# Patient Record
Sex: MALE | Race: OTHER | Employment: FULL TIME | URBAN - METROPOLITAN AREA
[De-identification: names, ages, dates, MRNs, and addresses within clinical notes are randomized per-mention and may not be internally consistent; named-entity substitution may affect disease eponyms.]

---

## 2019-04-26 ENCOUNTER — HOSPITAL ENCOUNTER (EMERGENCY)
Age: 34
Discharge: HOME | End: 2019-04-26

## 2019-04-26 ENCOUNTER — APPOINTMENT (OUTPATIENT)
Dept: GENERAL RADIOLOGY | Age: 34
End: 2019-04-26

## 2019-04-26 VITALS
OXYGEN SATURATION: 96 % | HEART RATE: 109 BPM | WEIGHT: 208 LBS | RESPIRATION RATE: 20 BRPM | DIASTOLIC BLOOD PRESSURE: 75 MMHG | SYSTOLIC BLOOD PRESSURE: 134 MMHG | TEMPERATURE: 99.4 F

## 2019-04-26 DIAGNOSIS — J45.21 MILD INTERMITTENT ASTHMA WITH EXACERBATION: ICD-10-CM

## 2019-04-26 DIAGNOSIS — J18.9 PNEUMONIA DUE TO ORGANISM: Primary | ICD-10-CM

## 2019-04-26 LAB
ALBUMIN SERPL-MCNC: 4.1 G/DL (ref 3.5–4.6)
ALP BLD-CCNC: 76 U/L (ref 35–104)
ALT SERPL-CCNC: 52 U/L (ref 0–41)
ANION GAP SERPL CALCULATED.3IONS-SCNC: 16 MEQ/L (ref 9–15)
AST SERPL-CCNC: 34 U/L (ref 0–40)
BASOPHILS ABSOLUTE: 0 K/UL (ref 0–0.2)
BASOPHILS RELATIVE PERCENT: 0.9 %
BILIRUB SERPL-MCNC: 0.4 MG/DL (ref 0.2–0.7)
BUN BLDV-MCNC: 16 MG/DL (ref 6–20)
CALCIUM SERPL-MCNC: 8.5 MG/DL (ref 8.5–9.9)
CHLORIDE BLD-SCNC: 102 MEQ/L (ref 95–107)
CO2: 20 MEQ/L (ref 20–31)
CREAT SERPL-MCNC: 1.03 MG/DL (ref 0.7–1.2)
EOSINOPHILS ABSOLUTE: 0.1 K/UL (ref 0–0.7)
EOSINOPHILS RELATIVE PERCENT: 1.9 %
GFR AFRICAN AMERICAN: >60
GFR NON-AFRICAN AMERICAN: >60
GLOBULIN: 2.5 G/DL (ref 2.3–3.5)
GLUCOSE BLD-MCNC: 107 MG/DL (ref 70–99)
HCT VFR BLD CALC: 46.1 % (ref 42–52)
HEMOGLOBIN: 16.1 G/DL (ref 14–18)
LYMPHOCYTES ABSOLUTE: 1.2 K/UL (ref 1–4.8)
LYMPHOCYTES RELATIVE PERCENT: 22.3 %
MCH RBC QN AUTO: 32.2 PG (ref 27–31.3)
MCHC RBC AUTO-ENTMCNC: 34.8 % (ref 33–37)
MCV RBC AUTO: 92.4 FL (ref 80–100)
MONOCYTES ABSOLUTE: 1 K/UL (ref 0.2–0.8)
MONOCYTES RELATIVE PERCENT: 17.6 %
NEUTROPHILS ABSOLUTE: 3.2 K/UL (ref 1.4–6.5)
NEUTROPHILS RELATIVE PERCENT: 57.3 %
PDW BLD-RTO: 13.7 % (ref 11.5–14.5)
PLATELET # BLD: 127 K/UL (ref 130–400)
POTASSIUM SERPL-SCNC: 4.4 MEQ/L (ref 3.4–4.9)
RAPID INFLUENZA  B AGN: NEGATIVE
RAPID INFLUENZA A AGN: NEGATIVE
RBC # BLD: 4.99 M/UL (ref 4.7–6.1)
SODIUM BLD-SCNC: 138 MEQ/L (ref 135–144)
TOTAL PROTEIN: 6.6 G/DL (ref 6.3–8)
WBC # BLD: 5.5 K/UL (ref 4.8–10.8)

## 2019-04-26 PROCEDURE — 36415 COLL VENOUS BLD VENIPUNCTURE: CPT

## 2019-04-26 PROCEDURE — 80053 COMPREHEN METABOLIC PANEL: CPT

## 2019-04-26 PROCEDURE — 6360000002 HC RX W HCPCS: Performed by: PERSONAL EMERGENCY RESPONSE ATTENDANT

## 2019-04-26 PROCEDURE — 99283 EMERGENCY DEPT VISIT LOW MDM: CPT

## 2019-04-26 PROCEDURE — 71046 X-RAY EXAM CHEST 2 VIEWS: CPT

## 2019-04-26 PROCEDURE — 6370000000 HC RX 637 (ALT 250 FOR IP): Performed by: PERSONAL EMERGENCY RESPONSE ATTENDANT

## 2019-04-26 PROCEDURE — 85025 COMPLETE CBC W/AUTO DIFF WBC: CPT

## 2019-04-26 PROCEDURE — 2580000003 HC RX 258: Performed by: PERSONAL EMERGENCY RESPONSE ATTENDANT

## 2019-04-26 PROCEDURE — 96374 THER/PROPH/DIAG INJ IV PUSH: CPT

## 2019-04-26 PROCEDURE — 87804 INFLUENZA ASSAY W/OPTIC: CPT

## 2019-04-26 PROCEDURE — 96375 TX/PRO/DX INJ NEW DRUG ADDON: CPT

## 2019-04-26 PROCEDURE — 94640 AIRWAY INHALATION TREATMENT: CPT

## 2019-04-26 RX ORDER — AZITHROMYCIN 500 MG/1
500 TABLET, FILM COATED ORAL ONCE
Status: COMPLETED | OUTPATIENT
Start: 2019-04-26 | End: 2019-04-26

## 2019-04-26 RX ORDER — KETOROLAC TROMETHAMINE 30 MG/ML
30 INJECTION, SOLUTION INTRAMUSCULAR; INTRAVENOUS ONCE
Status: COMPLETED | OUTPATIENT
Start: 2019-04-26 | End: 2019-04-26

## 2019-04-26 RX ORDER — IPRATROPIUM BROMIDE AND ALBUTEROL SULFATE 2.5; .5 MG/3ML; MG/3ML
1 SOLUTION RESPIRATORY (INHALATION) CONTINUOUS PRN
Status: DISCONTINUED | OUTPATIENT
Start: 2019-04-26 | End: 2019-04-26 | Stop reason: HOSPADM

## 2019-04-26 RX ORDER — AZITHROMYCIN 250 MG/1
250 TABLET, FILM COATED ORAL DAILY
Qty: 4 TABLET | Refills: 0 | Status: SHIPPED | OUTPATIENT
Start: 2019-04-26 | End: 2019-04-30

## 2019-04-26 RX ORDER — 0.9 % SODIUM CHLORIDE 0.9 %
1000 INTRAVENOUS SOLUTION INTRAVENOUS ONCE
Status: COMPLETED | OUTPATIENT
Start: 2019-04-26 | End: 2019-04-26

## 2019-04-26 RX ORDER — PREDNISONE 10 MG/1
60 TABLET ORAL DAILY
Qty: 30 TABLET | Refills: 0 | Status: SHIPPED | OUTPATIENT
Start: 2019-04-26 | End: 2019-05-01

## 2019-04-26 RX ORDER — METHYLPREDNISOLONE SODIUM SUCCINATE 125 MG/2ML
125 INJECTION, POWDER, LYOPHILIZED, FOR SOLUTION INTRAMUSCULAR; INTRAVENOUS ONCE
Status: COMPLETED | OUTPATIENT
Start: 2019-04-26 | End: 2019-04-26

## 2019-04-26 RX ADMIN — IPRATROPIUM BROMIDE AND ALBUTEROL SULFATE 1 AMPULE: .5; 3 SOLUTION RESPIRATORY (INHALATION) at 04:35

## 2019-04-26 RX ADMIN — AZITHROMYCIN 500 MG: 500 TABLET, FILM COATED ORAL at 05:21

## 2019-04-26 RX ADMIN — SODIUM CHLORIDE 1000 ML: 9 INJECTION, SOLUTION INTRAVENOUS at 04:44

## 2019-04-26 RX ADMIN — METHYLPREDNISOLONE SODIUM SUCCINATE 125 MG: 125 INJECTION, POWDER, FOR SOLUTION INTRAMUSCULAR; INTRAVENOUS at 05:21

## 2019-04-26 RX ADMIN — KETOROLAC TROMETHAMINE 30 MG: 30 INJECTION, SOLUTION INTRAMUSCULAR at 04:44

## 2019-04-26 ASSESSMENT — ENCOUNTER SYMPTOMS
ABDOMINAL PAIN: 0
BLOOD IN STOOL: 0
COLOR CHANGE: 0
SORE THROAT: 0
VOMITING: 0
NAUSEA: 0
SHORTNESS OF BREATH: 1
COUGH: 1
RHINORRHEA: 1
WHEEZING: 1
DIARRHEA: 0

## 2019-04-26 ASSESSMENT — PAIN DESCRIPTION - DESCRIPTORS: DESCRIPTORS: ACHING

## 2019-04-26 ASSESSMENT — PULMONARY FUNCTION TESTS: PEFR_L/MIN: 300

## 2019-04-26 ASSESSMENT — PAIN SCALES - GENERAL: PAINLEVEL_OUTOF10: 7

## 2019-04-26 ASSESSMENT — PAIN DESCRIPTION - LOCATION: LOCATION: GENERALIZED;CHEST

## 2019-04-26 ASSESSMENT — PAIN DESCRIPTION - FREQUENCY: FREQUENCY: CONTINUOUS

## 2019-04-26 ASSESSMENT — PAIN DESCRIPTION - ONSET: ONSET: AWAKENED FROM SLEEP

## 2019-04-26 NOTE — ED PROVIDER NOTES
HISTORY       Social History     Socioeconomic History    Marital status:      Spouse name: None    Number of children: None    Years of education: None    Highest education level: None   Occupational History    None   Social Needs    Financial resource strain: None    Food insecurity:     Worry: None     Inability: None    Transportation needs:     Medical: None     Non-medical: None   Tobacco Use    Smoking status: Current Every Day Smoker     Types: Cigarettes    Smokeless tobacco: Never Used   Substance and Sexual Activity    Alcohol use: Yes     Comment: socially    Drug use: Never    Sexual activity: Yes     Partners: Female   Lifestyle    Physical activity:     Days per week: None     Minutes per session: None    Stress: None   Relationships    Social connections:     Talks on phone: None     Gets together: None     Attends Worship service: None     Active member of club or organization: None     Attends meetings of clubs or organizations: None     Relationship status: None    Intimate partner violence:     Fear of current or ex partner: None     Emotionally abused: None     Physically abused: None     Forced sexual activity: None   Other Topics Concern    None   Social History Narrative    None         PHYSICAL EXAM         ED Triage Vitals [04/26/19 0413]   BP Temp Temp Source Pulse Resp SpO2 Height Weight   134/75 99.4 °F (37.4 °C) Oral 109 20 94 % -- 208 lb (94.3 kg)       Physical Exam   Constitutional: He is oriented to person, place, and time. He appears well-developed and well-nourished. HENT:   Head: Normocephalic and atraumatic. Mouth/Throat: Oropharynx is clear and moist. No oropharyngeal exudate. Right ear with cerumen impaction, unable to visualize TM. Left ear appears to have pustule otitis media. No discharge, no drainage, TM rupture. Eyes: Pupils are equal, round, and reactive to light. Conjunctivae and EOM are normal.   Neck: Normal range of motion.  Neck supple. No tracheal deviation present. Cardiovascular: Normal heart sounds and intact distal pulses. Pulmonary/Chest: Effort normal. No stridor. No respiratory distress. He has wheezes. Inspiratory and expiratory wheezing throughout, no obvious labored respirations   Abdominal: Soft. Bowel sounds are normal. He exhibits no distension and no mass. There is no tenderness. There is no rebound and no guarding. Musculoskeletal: Normal range of motion. Neurological: He is alert and oriented to person, place, and time. He has normal reflexes. Skin: Skin is warm and dry. Capillary refill takes less than 2 seconds. No rash noted. Psychiatric: He has a normal mood and affect. His behavior is normal. Judgment and thought content normal.       DIAGNOSTIC RESULTS     EKG:All EKG's are interpreted by the Emergency Department Physician who either signs or Co-signs this chart in the absence of a cardiologist.        RADIOLOGY:   Non-plain film images such as CT, Ultrasound and MRI are read by theradiologist. Plain radiographic images are visualized and preliminarily interpreted by the emergency physician with the below findings:    Interpretation per theRadiologist below, if available at the time of this note:    XR CHEST STANDARD (2 VW)    (Results Pending)           LABS:  Labs Reviewed   COMPREHENSIVE METABOLIC PANEL - Abnormal; Notable for the following components:       Result Value    Anion Gap 16 (*)     Glucose 107 (*)     ALT 52 (*)     All other components within normal limits   CBC WITH AUTO DIFFERENTIAL - Abnormal; Notable for the following components:    MCH 32.2 (*)     Platelets 491 (*)     Monocytes # 1.0 (*)     All other components within normal limits   RAPID INFLUENZA A/B ANTIGENS       All other labs were within normal range or not returned as of this dictation.     EMERGENCY DEPARTMENT COURSE and DIFFERENTIAL DIAGNOSIS/MDM:   Vitals:    Vitals:    04/26/19 0413 04/26/19 0439   BP: 134/75    Pulse: but occasionally words are mis-transcribed. )    JONI Kendall (electronically signed)  Emergency Physician Assistant         S[place]ina [place], [place]  78/32/42 [id number]

## 2019-05-21 ENCOUNTER — HOSPITAL ENCOUNTER (EMERGENCY)
Age: 34
Discharge: HOME | End: 2019-05-21
Attending: EMERGENCY MEDICINE

## 2019-05-21 ENCOUNTER — APPOINTMENT (OUTPATIENT)
Dept: GENERAL RADIOLOGY | Age: 34
End: 2019-05-21

## 2019-05-21 VITALS
OXYGEN SATURATION: 100 % | HEIGHT: 69 IN | DIASTOLIC BLOOD PRESSURE: 91 MMHG | BODY MASS INDEX: 31.1 KG/M2 | RESPIRATION RATE: 18 BRPM | TEMPERATURE: 97.8 F | WEIGHT: 210 LBS | SYSTOLIC BLOOD PRESSURE: 132 MMHG | HEART RATE: 66 BPM

## 2019-05-21 DIAGNOSIS — J45.901 MODERATE ACUTE EXACERBATION OF ASTHMA: Primary | ICD-10-CM

## 2019-05-21 LAB
ANION GAP SERPL CALCULATED.3IONS-SCNC: 14 MEQ/L (ref 9–15)
BASOPHILS ABSOLUTE: 0 K/UL (ref 0–0.2)
BASOPHILS RELATIVE PERCENT: 0.7 %
BUN BLDV-MCNC: 13 MG/DL (ref 6–20)
CALCIUM SERPL-MCNC: 8.7 MG/DL (ref 8.5–9.9)
CHLORIDE BLD-SCNC: 103 MEQ/L (ref 95–107)
CO2: 22 MEQ/L (ref 20–31)
CREAT SERPL-MCNC: 0.91 MG/DL (ref 0.7–1.2)
EKG ATRIAL RATE: 57 BPM
EKG P AXIS: 24 DEGREES
EKG P-R INTERVAL: 132 MS
EKG Q-T INTERVAL: 426 MS
EKG QRS DURATION: 92 MS
EKG QTC CALCULATION (BAZETT): 414 MS
EKG R AXIS: 33 DEGREES
EKG T AXIS: 27 DEGREES
EKG VENTRICULAR RATE: 57 BPM
EOSINOPHILS ABSOLUTE: 0.3 K/UL (ref 0–0.7)
EOSINOPHILS RELATIVE PERCENT: 5.4 %
GFR AFRICAN AMERICAN: >60
GFR NON-AFRICAN AMERICAN: >60
GLUCOSE BLD-MCNC: 163 MG/DL (ref 70–99)
HCT VFR BLD CALC: 44.7 % (ref 42–52)
HEMOGLOBIN: 16 G/DL (ref 14–18)
LYMPHOCYTES ABSOLUTE: 2.1 K/UL (ref 1–4.8)
LYMPHOCYTES RELATIVE PERCENT: 36.2 %
MCH RBC QN AUTO: 33.2 PG (ref 27–31.3)
MCHC RBC AUTO-ENTMCNC: 35.7 % (ref 33–37)
MCV RBC AUTO: 93 FL (ref 80–100)
MONOCYTES ABSOLUTE: 0.6 K/UL (ref 0.2–0.8)
MONOCYTES RELATIVE PERCENT: 10.3 %
NEUTROPHILS ABSOLUTE: 2.8 K/UL (ref 1.4–6.5)
NEUTROPHILS RELATIVE PERCENT: 47.4 %
PDW BLD-RTO: 13.7 % (ref 11.5–14.5)
PLATELET # BLD: 115 K/UL (ref 130–400)
POTASSIUM SERPL-SCNC: 3.6 MEQ/L (ref 3.4–4.9)
RBC # BLD: 4.81 M/UL (ref 4.7–6.1)
SODIUM BLD-SCNC: 139 MEQ/L (ref 135–144)
TROPONIN: <0.01 NG/ML (ref 0–0.01)
WBC # BLD: 5.9 K/UL (ref 4.8–10.8)

## 2019-05-21 PROCEDURE — 85025 COMPLETE CBC W/AUTO DIFF WBC: CPT

## 2019-05-21 PROCEDURE — 99285 EMERGENCY DEPT VISIT HI MDM: CPT

## 2019-05-21 PROCEDURE — 6360000002 HC RX W HCPCS: Performed by: EMERGENCY MEDICINE

## 2019-05-21 PROCEDURE — 94640 AIRWAY INHALATION TREATMENT: CPT

## 2019-05-21 PROCEDURE — 71045 X-RAY EXAM CHEST 1 VIEW: CPT

## 2019-05-21 PROCEDURE — 80048 BASIC METABOLIC PNL TOTAL CA: CPT

## 2019-05-21 PROCEDURE — 96374 THER/PROPH/DIAG INJ IV PUSH: CPT

## 2019-05-21 PROCEDURE — 93005 ELECTROCARDIOGRAM TRACING: CPT

## 2019-05-21 PROCEDURE — 36415 COLL VENOUS BLD VENIPUNCTURE: CPT

## 2019-05-21 PROCEDURE — 84484 ASSAY OF TROPONIN QUANT: CPT

## 2019-05-21 RX ORDER — METHYLPREDNISOLONE SODIUM SUCCINATE 125 MG/2ML
125 INJECTION, POWDER, LYOPHILIZED, FOR SOLUTION INTRAMUSCULAR; INTRAVENOUS ONCE
Status: COMPLETED | OUTPATIENT
Start: 2019-05-21 | End: 2019-05-21

## 2019-05-21 RX ORDER — PREDNISONE 20 MG/1
20 TABLET ORAL 2 TIMES DAILY
Qty: 10 TABLET | Refills: 0 | Status: SHIPPED | OUTPATIENT
Start: 2019-05-21 | End: 2019-05-26

## 2019-05-21 RX ORDER — SODIUM CHLORIDE 0.9 % (FLUSH) 0.9 %
3 SYRINGE (ML) INJECTION EVERY 8 HOURS
Status: DISCONTINUED | OUTPATIENT
Start: 2019-05-21 | End: 2019-05-21 | Stop reason: HOSPADM

## 2019-05-21 RX ORDER — ALBUTEROL SULFATE 90 UG/1
AEROSOL, METERED RESPIRATORY (INHALATION)
Qty: 1 INHALER | Refills: 1 | Status: SHIPPED | OUTPATIENT
Start: 2019-05-21 | End: 2019-07-04

## 2019-05-21 RX ADMIN — IPRATROPIUM BROMIDE 0.5 MG: 0.5 SOLUTION RESPIRATORY (INHALATION) at 02:56

## 2019-05-21 RX ADMIN — METHYLPREDNISOLONE SODIUM SUCCINATE 125 MG: 125 INJECTION, POWDER, FOR SOLUTION INTRAMUSCULAR; INTRAVENOUS at 02:41

## 2019-05-21 RX ADMIN — ALBUTEROL SULFATE 5 MG: 2.5 SOLUTION RESPIRATORY (INHALATION) at 02:56

## 2019-05-21 ASSESSMENT — ENCOUNTER SYMPTOMS
BLOOD IN STOOL: 0
SORE THROAT: 0
SHORTNESS OF BREATH: 1
BACK PAIN: 0
GASTROINTESTINAL NEGATIVE: 1
CHEST TIGHTNESS: 1
DIARRHEA: 0
COUGH: 1
ABDOMINAL DISTENTION: 0
EYE PAIN: 0
EYE DISCHARGE: 0
SINUS PAIN: 0
WHEEZING: 1
NAUSEA: 0
EYES NEGATIVE: 1
VOMITING: 0
ABDOMINAL PAIN: 0

## 2019-05-21 ASSESSMENT — PAIN DESCRIPTION - DESCRIPTORS: DESCRIPTORS: STABBING

## 2019-05-21 ASSESSMENT — PAIN DESCRIPTION - PAIN TYPE: TYPE: ACUTE PAIN

## 2019-05-21 ASSESSMENT — PAIN DESCRIPTION - LOCATION: LOCATION: CHEST

## 2019-05-21 ASSESSMENT — PAIN DESCRIPTION - FREQUENCY: FREQUENCY: CONTINUOUS

## 2019-05-21 ASSESSMENT — PAIN DESCRIPTION - ORIENTATION: ORIENTATION: MID

## 2019-05-21 ASSESSMENT — PAIN SCALES - GENERAL: PAINLEVEL_OUTOF10: 8

## 2019-05-21 NOTE — ED TRIAGE NOTES
Pt states that he began having CP 2 hours ago and he has been having a cough for x3 weeks, today pt states his cough increased. Pt has recent dx pneumonia. Pt states that he also began having vomiting and lightheadedness x2 hours ago.

## 2019-05-21 NOTE — ED PROVIDER NOTES
[address] [facility] ED  eMERGENCY dEPARTMENT eNCOUnter      Pt Name: Bette Rai  MRN: [id number]  [place] 1985  Date of evaluation: 5/21/2019  Provider: Donald Penaloza, [address] [place] [address]       Chief Complaint   Patient presents with    Chest Pain    Cough         HISTORY OF PRESENT ILLNESS   (Location/Symptom, Timing/Onset,Context/Setting, Quality, Duration, Modifying Factors, Severity)  Note limiting factors. Bette Rai is a 35 y.o. male who presents to the emergency department complaining of wheezing cough productive yellow sputum. The patient had pneumonia 2 weeks ago was on antibiotics. He states he got better but then he started to get sick again. The patient states his chest hurts when he coughs when he takes a deep breath. Patient is a smoker and continues to smoke now. Patient's pain is achy tightness in nature reproducible with deep breath and cough nonradiating and only associated with above symptoms. HPI    NursingNotes were reviewed. REVIEW OF SYSTEMS    (2-9 systems for level 4, 10 or more for level 5)     Review of Systems   Constitutional: Negative. Negative for chills and fever. HENT: Negative. Negative for ear pain, sinus pain and sore throat. Eyes: Negative. Negative for pain and discharge. Respiratory: Positive for cough, chest tightness, shortness of breath and wheezing. Cardiovascular: Negative. Negative for chest pain, palpitations and leg swelling. Gastrointestinal: Negative. Negative for abdominal distention, abdominal pain, blood in stool, diarrhea, nausea and vomiting. Endocrine: Negative. Negative for polydipsia and polyuria. Genitourinary: Negative. Negative for difficulty urinating, dysuria, flank pain, frequency, hematuria and urgency. Musculoskeletal: Negative. Negative for arthralgias, back pain, myalgias and neck pain. Skin: Negative. Negative for rash and wound. Neurological: Negative.   Negative for dizziness, seizures, Other Topics Concern    None   Social History Narrative    None       SCREENINGS      @FLOW([id number])@      PHYSICAL EXAM    (up to 7 for level 4, 8 or more for level 5)     ED Triage Vitals [05/21/19 0224]   BP Temp Temp Source Pulse Resp SpO2 Height Weight   (!) 122/92 97.8 °F (36.6 °C) Oral 61 (!) 98 98 % 5' 9\" (1.753 m) 210 lb (95.3 kg)       Physical Exam   Constitutional: He is oriented to person, place, and time. He appears well-developed and well-nourished. HENT:   Head: Normocephalic and atraumatic. Right Ear: External ear normal.   Left Ear: External ear normal.   Eyes: Pupils are equal, round, and reactive to light. Conjunctivae and EOM are normal.   Neck: Normal range of motion. Neck supple. No JVD present. No tracheal deviation present. Cardiovascular: Normal rate, regular rhythm, normal heart sounds and intact distal pulses. Exam reveals no friction rub. No murmur heard. Pulmonary/Chest: Effort normal. No respiratory distress. He has wheezes. He exhibits tenderness. Abdominal: Soft. Bowel sounds are normal. He exhibits no distension. There is no tenderness. There is no guarding. Musculoskeletal: Normal range of motion. He exhibits no edema, tenderness or deformity. Lymphadenopathy:     He has no cervical adenopathy. Neurological: He is alert and oriented to person, place, and time. Skin: Skin is warm and dry. No rash noted. No erythema. No pallor. Psychiatric: He has a normal mood and affect. His behavior is normal.   Nursing note and vitals reviewed. DIAGNOSTIC RESULTS     EKG: All EKG's are interpreted by the Emergency Department Physician who either signs or Co-signsthis chart in the absence of a cardiologist.    EKG interpreted by myself shows a sinus bradycardia 57 ventricular rate. Vital 132 ms QT corrected 414 ms. There is no ST-T wave changes.     RADIOLOGY:   Non-plain filmimages such as CT, Ultrasound and MRI are read by the radiologist. Plain radiographic images are visualized and preliminarily interpreted by the emergency physician with the below findings:    Chest x-rays negative normal cardiopulmonary shadow    Interpretation per the Radiologist below, if available at the time ofthis note:    XR CHEST PORTABLE    (Results Pending)         ED BEDSIDE ULTRASOUND:   Performed by ED Physician - none    LABS:  Labs Reviewed   BASIC METABOLIC PANEL - Abnormal; Notable for the following components:       Result Value    Glucose 163 (*)     All other components within normal limits   CBC WITH AUTO DIFFERENTIAL - Abnormal; Notable for the following components:    MCH 33.2 (*)     Platelets 160 (*)     All other components within normal limits   TROPONIN       All other labs were within normal range or not returned as of this dictation. EMERGENCY DEPARTMENT COURSE and DIFFERENTIAL DIAGNOSIS/MDM:   Vitals:    Vitals:    05/21/19 0224 05/21/19 0301   BP: (!) 122/92    Pulse: 61    Resp: (!) 98 18   Temp: 97.8 °F (36.6 °C)    TempSrc: Oral    SpO2: 98% 100%   Weight: 210 lb (95.3 kg)    Height: 5' 9\" (1.753 m)        Patient's chest x-ray is negative the patient received breathing treatments and he is doing much better at this time he still has slight inspiratory wheeze but his sats are 98%. .  Patient's Coumadin be given prednisone prescription as well as albuterol inhaler and I did  him for 3 minutes on not smoking explained to him that he may not be able to withstand viral infections anymore and he is wheezing this much and hurting this much from coughing that is got chest wall pain these things are not getting better. Continues to smoke. MDM    CRITICAL CARE TIME   Total Critical Care time was 0 minutes, excluding separately reportableprocedures. There was a high probability of clinicallysignificant/life threatening deterioration in the patient's condition which required my urgent intervention.       CONSULTS:  None    PROCEDURES:  Unless otherwise noted

## 2019-05-24 PROCEDURE — 93010 ELECTROCARDIOGRAM REPORT: CPT | Performed by: INTERNAL MEDICINE

## 2019-06-29 ENCOUNTER — HOSPITAL ENCOUNTER (EMERGENCY)
Age: 34
Discharge: HOME | End: 2019-06-30

## 2019-06-29 ENCOUNTER — APPOINTMENT (OUTPATIENT)
Dept: GENERAL RADIOLOGY | Age: 34
End: 2019-06-29

## 2019-06-29 ENCOUNTER — APPOINTMENT (OUTPATIENT)
Dept: CT IMAGING | Age: 34
End: 2019-06-29

## 2019-06-29 VITALS
DIASTOLIC BLOOD PRESSURE: 80 MMHG | HEIGHT: 69 IN | HEART RATE: 90 BPM | WEIGHT: 200 LBS | RESPIRATION RATE: 18 BRPM | SYSTOLIC BLOOD PRESSURE: 120 MMHG | BODY MASS INDEX: 29.62 KG/M2 | TEMPERATURE: 98.6 F

## 2019-06-29 DIAGNOSIS — S02.2XXA CLOSED FRACTURE OF NASAL BONE, INITIAL ENCOUNTER: Primary | ICD-10-CM

## 2019-06-29 DIAGNOSIS — S16.1XXA CERVICAL STRAIN, ACUTE, INITIAL ENCOUNTER: ICD-10-CM
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
## 2019-06-29 DIAGNOSIS — S40.012A CONTUSION OF LEFT SHOULDER, INITIAL ENCOUNTER: ICD-10-CM

## 2019-06-29 PROCEDURE — 72125 CT NECK SPINE W/O DYE: CPT

## 2019-06-29 PROCEDURE — 6360000002 HC RX W HCPCS: Performed by: NURSE PRACTITIONER

## 2019-06-29 PROCEDURE — 73030 X-RAY EXAM OF SHOULDER: CPT

## 2019-06-29 PROCEDURE — 96372 THER/PROPH/DIAG INJ SC/IM: CPT

## 2019-06-29 PROCEDURE — 70450 CT HEAD/BRAIN W/O DYE: CPT

## 2019-06-29 PROCEDURE — 70486 CT MAXILLOFACIAL W/O DYE: CPT

## 2019-06-29 PROCEDURE — 99284 EMERGENCY DEPT VISIT MOD MDM: CPT

## 2019-06-29 RX ORDER — KETOROLAC TROMETHAMINE 30 MG/ML
60 INJECTION, SOLUTION INTRAMUSCULAR; INTRAVENOUS ONCE
Status: COMPLETED | OUTPATIENT
Start: 2019-06-29 | End: 2019-06-29

## 2019-06-29 RX ADMIN — KETOROLAC TROMETHAMINE 60 MG: 30 INJECTION, SOLUTION INTRAMUSCULAR; INTRAVENOUS at 22:48

## 2019-06-29 ASSESSMENT — ENCOUNTER SYMPTOMS
RHINORRHEA: 0
TROUBLE SWALLOWING: 0
BACK PAIN: 0
COLOR CHANGE: 0
COUGH: 0
CHEST TIGHTNESS: 0
VOMITING: 0
WHEEZING: 0
ABDOMINAL PAIN: 0
DIARRHEA: 0
SORE THROAT: 0
SHORTNESS OF BREATH: 0
NAUSEA: 0

## 2019-06-29 ASSESSMENT — PAIN SCALES - GENERAL
PAINLEVEL_OUTOF10: 9

## 2019-06-29 ASSESSMENT — PAIN DESCRIPTION - ORIENTATION: ORIENTATION: LEFT

## 2019-06-29 ASSESSMENT — PAIN DESCRIPTION - LOCATION: LOCATION: ARM;NOSE

## 2019-06-29 ASSESSMENT — PAIN DESCRIPTION - PAIN TYPE: TYPE: ACUTE PAIN

## 2019-06-30 RX ORDER — TRAMADOL HYDROCHLORIDE 50 MG/1
50 TABLET ORAL EVERY 6 HOURS PRN
Qty: 20 TABLET | Refills: 0 | Status: SHIPPED | OUTPATIENT
Start: 2019-06-30 | End: 2019-07-05

## 2019-06-30 RX ORDER — IBUPROFEN 600 MG/1
600 TABLET ORAL EVERY 6 HOURS PRN
Qty: 28 TABLET | Refills: 0 | Status: SHIPPED | OUTPATIENT
Start: 2019-06-30 | End: 2019-07-04 | Stop reason: ALTCHOICE

## 2019-06-30 RX ORDER — CYCLOBENZAPRINE HCL 10 MG
10 TABLET ORAL 2 TIMES DAILY PRN
Qty: 14 TABLET | Refills: 0 | Status: SHIPPED | OUTPATIENT
Start: 2019-06-30 | End: 2019-07-04 | Stop reason: ALTCHOICE
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
## 2019-07-01 ENCOUNTER — HOSPITAL ENCOUNTER (EMERGENCY)
Age: 34
Discharge: HOME | End: 2019-07-01

## 2019-07-01 VITALS
TEMPERATURE: 97.7 F | OXYGEN SATURATION: 96 % | DIASTOLIC BLOOD PRESSURE: 81 MMHG | HEIGHT: 69 IN | RESPIRATION RATE: 18 BRPM | HEART RATE: 69 BPM | SYSTOLIC BLOOD PRESSURE: 119 MMHG | WEIGHT: 200 LBS | BODY MASS INDEX: 29.62 KG/M2

## 2019-07-01 DIAGNOSIS — S02.2XXA CLOSED FRACTURE OF NASAL BONE, INITIAL ENCOUNTER: Primary | ICD-10-CM

## 2019-07-01 PROCEDURE — 99282 EMERGENCY DEPT VISIT SF MDM: CPT

## 2019-07-01 RX ORDER — NAPROXEN 500 MG/1
500 TABLET ORAL 2 TIMES DAILY
Qty: 20 TABLET | Refills: 0 | Status: SHIPPED | OUTPATIENT
Start: 2019-07-01 | End: 2019-07-04 | Stop reason: ALTCHOICE

## 2019-07-01 ASSESSMENT — ENCOUNTER SYMPTOMS
SINUS PAIN: 0
BACK PAIN: 0
SINUS PRESSURE: 0
SHORTNESS OF BREATH: 0
COUGH: 0
ABDOMINAL PAIN: 0
SORE THROAT: 0
TROUBLE SWALLOWING: 0
FACIAL SWELLING: 1
RHINORRHEA: 0

## 2019-07-01 ASSESSMENT — PAIN SCALES - GENERAL: PAINLEVEL_OUTOF10: 7

## 2019-07-01 ASSESSMENT — PAIN DESCRIPTION - LOCATION: LOCATION: FACE

## 2019-07-01 NOTE — ED PROVIDER NOTES
for Pain for up to 5 days. Intended supply: 5 days. Take lowest dose possible to manage pain       ALLERGIES     Asa [aspirin]    FAMILY HISTORY     History reviewed. No pertinent family history. SOCIAL HISTORY       Social History     Socioeconomic History    Marital status:      Spouse name: None    Number of children: None    Years of education: None    Highest education level: None   Occupational History    None   Social Needs    Financial resource strain: None    Food insecurity:     Worry: None     Inability: None    Transportation needs:     Medical: None     Non-medical: None   Tobacco Use    Smoking status: Current Every Day Smoker     Packs/day: 0.50     Types: Cigarettes    Smokeless tobacco: Never Used   Substance and Sexual Activity    Alcohol use: Yes     Comment: socially    Drug use: Never    Sexual activity: Yes     Partners: Female   Lifestyle    Physical activity:     Days per week: None     Minutes per session: None    Stress: None   Relationships    Social connections:     Talks on phone: None     Gets together: None     Attends Quaker service: None     Active member of club or organization: None     Attends meetings of clubs or organizations: None     Relationship status: None    Intimate partner violence:     Fear of current or ex partner: None     Emotionally abused: None     Physically abused: None     Forced sexual activity: None   Other Topics Concern    None   Social History Narrative    None       SCREENINGS      @FLOW([id number])@      PHYSICAL EXAM    (up to 7 for level 4, 8 or more for level 5)     ED Triage Vitals [07/01/19 1242]   BP Temp Temp Source Pulse Resp SpO2 Height Weight   119/81 97.7 °F (36.5 °C) Oral 69 18 96 % 5' 9\" (1.753 m) 200 lb (90.7 kg)       Physical Exam   Constitutional: He is oriented to person, place, and time. He appears well-developed and well-nourished. HENT:   Head: Normocephalic and atraumatic.        Right Ear: Hearing, CNP  07/01/19 1312

## 2019-07-04 ENCOUNTER — HOSPITAL ENCOUNTER (EMERGENCY)
Age: 34
Discharge: HOME | End: 2019-07-04
Attending: EMERGENCY MEDICINE

## 2019-07-04 VITALS
TEMPERATURE: 98.6 F | DIASTOLIC BLOOD PRESSURE: 75 MMHG | HEIGHT: 69 IN | OXYGEN SATURATION: 97 % | HEART RATE: 79 BPM | WEIGHT: 200 LBS | BODY MASS INDEX: 29.62 KG/M2 | RESPIRATION RATE: 18 BRPM | SYSTOLIC BLOOD PRESSURE: 116 MMHG

## 2019-07-04 DIAGNOSIS — S02.2XXD CLOSED FRACTURE OF NASAL BONE WITH ROUTINE HEALING, SUBSEQUENT ENCOUNTER: Primary | ICD-10-CM

## 2019-07-04 PROCEDURE — 99282 EMERGENCY DEPT VISIT SF MDM: CPT

## 2019-07-04 PROCEDURE — 6370000000 HC RX 637 (ALT 250 FOR IP): Performed by: EMERGENCY MEDICINE

## 2019-07-04 RX ORDER — OXYMETAZOLINE HYDROCHLORIDE 0.05 G/100ML
2 SPRAY NASAL ONCE
Status: COMPLETED | OUTPATIENT
Start: 2019-07-04 | End: 2019-07-04

## 2019-07-04 RX ORDER — OXYCODONE HYDROCHLORIDE AND ACETAMINOPHEN 5; 325 MG/1; MG/1
1 TABLET ORAL ONCE
Status: COMPLETED | OUTPATIENT
Start: 2019-07-04 | End: 2019-07-04

## 2019-07-04 RX ORDER — FLUTICASONE PROPIONATE 50 MCG
1 SPRAY, SUSPENSION (ML) NASAL DAILY
Qty: 1 BOTTLE | Refills: 0 | Status: SHIPPED | OUTPATIENT
Start: 2019-07-04 | End: 2019-08-04 | Stop reason: ALTCHOICE

## 2019-07-04 RX ORDER — OXYCODONE HYDROCHLORIDE AND ACETAMINOPHEN 5; 325 MG/1; MG/1
1 TABLET ORAL EVERY 6 HOURS PRN
Qty: 10 TABLET | Refills: 0 | Status: SHIPPED | OUTPATIENT
Start: 2019-07-04 | End: 2019-07-07

## 2019-07-04 RX ADMIN — OXYCODONE HYDROCHLORIDE AND ACETAMINOPHEN 1 TABLET: 5; 325 TABLET ORAL at 22:51

## 2019-07-04 RX ADMIN — Medication 2 SPRAY: at 22:51

## 2019-07-04 ASSESSMENT — PAIN DESCRIPTION - FREQUENCY: FREQUENCY: INTERMITTENT

## 2019-07-04 ASSESSMENT — PAIN SCALES - GENERAL: PAINLEVEL_OUTOF10: 8

## 2019-07-04 ASSESSMENT — PAIN DESCRIPTION - PROGRESSION: CLINICAL_PROGRESSION: GRADUALLY WORSENING

## 2019-07-04 ASSESSMENT — ENCOUNTER SYMPTOMS
RHINORRHEA: 0
WHEEZING: 0
PHOTOPHOBIA: 0
ABDOMINAL DISTENTION: 0
SINUS PRESSURE: 0
APNEA: 0
ABDOMINAL PAIN: 0
VOMITING: 0
CONSTIPATION: 0
NAUSEA: 0
SORE THROAT: 0
SHORTNESS OF BREATH: 0
DIARRHEA: 0
BACK PAIN: 0
COUGH: 0
COLOR CHANGE: 0
EYE PAIN: 0

## 2019-07-04 ASSESSMENT — PAIN - FUNCTIONAL ASSESSMENT: PAIN_FUNCTIONAL_ASSESSMENT: PREVENTS OR INTERFERES WITH MANY ACTIVE NOT PASSIVE ACTIVITIES

## 2019-07-04 ASSESSMENT — PAIN DESCRIPTION - ONSET: ONSET: GRADUAL

## 2019-07-04 ASSESSMENT — PAIN DESCRIPTION - PAIN TYPE: TYPE: ACUTE PAIN

## 2019-07-04 ASSESSMENT — PAIN DESCRIPTION - DESCRIPTORS: DESCRIPTORS: PRESSURE

## 2019-07-04 ASSESSMENT — PAIN DESCRIPTION - LOCATION: LOCATION: NOSE
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
## 2019-08-04 ENCOUNTER — APPOINTMENT (OUTPATIENT)
Dept: GENERAL RADIOLOGY | Age: 34
End: 2019-08-04

## 2019-08-04 ENCOUNTER — HOSPITAL ENCOUNTER (EMERGENCY)
Age: 34
Discharge: HOME | End: 2019-08-04
Attending: EMERGENCY MEDICINE
Payer: COMMERCIAL

## 2019-08-04 VITALS
OXYGEN SATURATION: 98 % | DIASTOLIC BLOOD PRESSURE: 80 MMHG | SYSTOLIC BLOOD PRESSURE: 128 MMHG | TEMPERATURE: 98.1 F | WEIGHT: 200 LBS | BODY MASS INDEX: 29.53 KG/M2 | HEART RATE: 70 BPM | RESPIRATION RATE: 18 BRPM

## 2019-08-04 DIAGNOSIS — T78.40XA ALLERGIC STATE, INITIAL ENCOUNTER: Primary | ICD-10-CM

## 2019-08-04 LAB
EKG ATRIAL RATE: 75 BPM
EKG P AXIS: 18 DEGREES
EKG P-R INTERVAL: 126 MS
EKG Q-T INTERVAL: 382 MS
EKG QRS DURATION: 88 MS
EKG QTC CALCULATION (BAZETT): 426 MS
EKG R AXIS: 26 DEGREES
EKG T AXIS: 17 DEGREES
EKG VENTRICULAR RATE: 75 BPM

## 2019-08-04 PROCEDURE — 6360000002 HC RX W HCPCS: Performed by: EMERGENCY MEDICINE

## 2019-08-04 PROCEDURE — 2580000003 HC RX 258: Performed by: EMERGENCY MEDICINE

## 2019-08-04 PROCEDURE — 71045 X-RAY EXAM CHEST 1 VIEW: CPT

## 2019-08-04 PROCEDURE — 96374 THER/PROPH/DIAG INJ IV PUSH: CPT

## 2019-08-04 PROCEDURE — 99283 EMERGENCY DEPT VISIT LOW MDM: CPT

## 2019-08-04 PROCEDURE — 96375 TX/PRO/DX INJ NEW DRUG ADDON: CPT

## 2019-08-04 PROCEDURE — 2500000003 HC RX 250 WO HCPCS: Performed by: EMERGENCY MEDICINE

## 2019-08-04 PROCEDURE — 6370000000 HC RX 637 (ALT 250 FOR IP): Performed by: EMERGENCY MEDICINE

## 2019-08-04 PROCEDURE — 93005 ELECTROCARDIOGRAM TRACING: CPT | Performed by: EMERGENCY MEDICINE

## 2019-08-04 RX ORDER — DIPHENHYDRAMINE HYDROCHLORIDE 50 MG/ML
25 INJECTION INTRAMUSCULAR; INTRAVENOUS ONCE
Status: COMPLETED | OUTPATIENT
Start: 2019-08-04 | End: 2019-08-04

## 2019-08-04 RX ORDER — FAMOTIDINE 20 MG/1
20 TABLET, FILM COATED ORAL 2 TIMES DAILY
Qty: 20 TABLET | Refills: 0 | Status: SHIPPED | OUTPATIENT
Start: 2019-08-04 | End: 2021-09-27

## 2019-08-04 RX ORDER — PREDNISONE 20 MG/1
60 TABLET ORAL ONCE
Status: COMPLETED | OUTPATIENT
Start: 2019-08-04 | End: 2019-08-04

## 2019-08-04 RX ORDER — 0.9 % SODIUM CHLORIDE 0.9 %
500 INTRAVENOUS SOLUTION INTRAVENOUS ONCE
Status: COMPLETED | OUTPATIENT
Start: 2019-08-04 | End: 2019-08-04

## 2019-08-04 RX ADMIN — DIPHENHYDRAMINE HYDROCHLORIDE 25 MG: 50 INJECTION, SOLUTION INTRAMUSCULAR; INTRAVENOUS at 02:44

## 2019-08-04 RX ADMIN — SODIUM CHLORIDE 500 ML: 9 INJECTION, SOLUTION INTRAVENOUS at 02:44

## 2019-08-04 RX ADMIN — FAMOTIDINE 20 MG: 10 INJECTION, SOLUTION INTRAVENOUS at 02:44

## 2019-08-04 RX ADMIN — PREDNISONE 60 MG: 20 TABLET ORAL at 02:44

## 2019-08-04 ASSESSMENT — ENCOUNTER SYMPTOMS
PHOTOPHOBIA: 0
EYE REDNESS: 0
VOMITING: 0
ABDOMINAL PAIN: 0
WHEEZING: 0
COUGH: 0
EYE PAIN: 0
SORE THROAT: 0
BLOOD IN STOOL: 0
NAUSEA: 0
SHORTNESS OF BREATH: 0
BACK PAIN: 0
EYE DISCHARGE: 0
DIARRHEA: 0
CONSTIPATION: 0
STRIDOR: 0

## 2019-08-04 ASSESSMENT — PAIN DESCRIPTION - PAIN TYPE: TYPE: ACUTE PAIN

## 2019-08-04 ASSESSMENT — PAIN SCALES - GENERAL: PAINLEVEL_OUTOF10: 7

## 2019-08-04 ASSESSMENT — PAIN DESCRIPTION - DESCRIPTORS: DESCRIPTORS: SHARP;STABBING

## 2019-08-04 ASSESSMENT — PAIN DESCRIPTION - LOCATION: LOCATION: CHEST;ARM

## 2019-08-04 ASSESSMENT — PAIN DESCRIPTION - ORIENTATION: ORIENTATION: LEFT

## 2019-08-04 NOTE — ED PROVIDER NOTES
Skin: Positive for rash. Allergic/Immunologic: Negative for environmental allergies. Neurological: Negative for dizziness, tremors, seizures, weakness and headaches. Hematological: Does not bruise/bleed easily. Psychiatric/Behavioral: Negative for hallucinations and suicidal ideas. The patient is not nervous/anxious. Except as noted above the remainder of the review of systems was reviewed and negative. PAST MEDICAL HISTORY     Past Medical History:   Diagnosis Date    Asthma          SURGICAL HISTORY       Past Surgical History:   Procedure Laterality Date    APPENDECTOMY           CURRENT MEDICATIONS       Previous Medications    No medications on file       ALLERGIES     Asa [aspirin]    FAMILY HISTORY     History reviewed. No pertinent family history.        SOCIAL HISTORY       Social History     Socioeconomic History    Marital status:      Spouse name: None    Number of children: None    Years of education: None    Highest education level: None   Occupational History    None   Social Needs    Financial resource strain: None    Food insecurity:     Worry: None     Inability: None    Transportation needs:     Medical: None     Non-medical: None   Tobacco Use    Smoking status: Current Every Day Smoker     Packs/day: 0.50     Types: Cigarettes    Smokeless tobacco: Never Used   Substance and Sexual Activity    Alcohol use: Yes     Comment: socially    Drug use: Never    Sexual activity: Yes     Partners: Female   Lifestyle    Physical activity:     Days per week: None     Minutes per session: None    Stress: None   Relationships    Social connections:     Talks on phone: None     Gets together: None     Attends Rastafari service: None     Active member of club or organization: None     Attends meetings of clubs or organizations: None     Relationship status: None    Intimate partner violence:     Fear of current or ex partner: None     Emotionally abused: None behavior is normal. Judgment and thought content normal.   Nursing note and vitals reviewed. DIAGNOSTIC RESULTS     EKG: All EKG's are interpreted by the Emergency Department Physician who either signs or Co-signs this chart in the absence of a cardiologist.    Of lead EKG was performed which shows normal sinus rhythm with a rate of 75 bpm.  There is no ectopy. There is no ST segment elevation or depression in any limb lead or precordial lead. Summary normal EKG. RADIOLOGY:   Non-plain film images such as CT, Ultrasound and MRI are read by the radiologist. Plain radiographic images are visualized and preliminarily interpreted by the emergency physician with the below findings:    1 view chest x-ray was performed which shows normal cardiac silhouette no infiltrates no hemothorax no pneumothorax. Summary normal 1 view chest x-ray. Interpretation per the Radiologist below, if available at the time of this note:    XR CHEST PORTABLE    (Results Pending)         ED BEDSIDE ULTRASOUND:   Performed by ED Physician - none    LABS:  Labs Reviewed - No data to display    All other labs were within normal range or not returned as of this dictation. EMERGENCY DEPARTMENT COURSE and DIFFERENTIAL DIAGNOSIS/MDM:   Vitals:    Vitals:    08/04/19 0148 08/04/19 0256 08/04/19 0308   BP: 116/75 118/74 127/82   Pulse: 89 71 72   Resp: 18 18 18   Temp: 98.1 °F (36.7 °C) 98.1 °F (36.7 °C)    TempSrc: Oral Oral    SpO2: 98% 98% 97%   Weight: 200 lb (90.7 kg)         She was treated for a medication reaction. The chest x-ray was printed and given to the patient. I informed him that his EKG was normal.  Patient states he still feels somewhat better now. Patient will be discharged home in improved condition. MDM       CRITICAL CARE TIME     CONSULTS:  None    PROCEDURES:  Unless otherwise noted below, none     Procedures    FINAL IMPRESSION      1.  Allergic state, initial encounter          DISPOSITION/PLAN

## 2019-08-04 NOTE — ED NOTES
IV d/c with cath intact. Pt brittnee well.   DC amb with Rx x1 with steady gait     Judge Ventura RN  08/04/19 7040

## 2019-08-05 PROCEDURE — 93010 ELECTROCARDIOGRAM REPORT: CPT | Performed by: INTERNAL MEDICINE

## 2019-09-12 ENCOUNTER — HOSPITAL ENCOUNTER (EMERGENCY)
Age: 34
Discharge: HOME | End: 2019-09-12
Payer: COMMERCIAL

## 2019-09-12 ENCOUNTER — APPOINTMENT (OUTPATIENT)
Dept: GENERAL RADIOLOGY | Age: 34
End: 2019-09-12
Payer: COMMERCIAL

## 2019-09-12 VITALS
HEART RATE: 80 BPM | RESPIRATION RATE: 18 BRPM | DIASTOLIC BLOOD PRESSURE: 77 MMHG | SYSTOLIC BLOOD PRESSURE: 126 MMHG | BODY MASS INDEX: 29.62 KG/M2 | WEIGHT: 200 LBS | HEIGHT: 69 IN | TEMPERATURE: 97.5 F | OXYGEN SATURATION: 100 %

## 2019-09-12 DIAGNOSIS — S50.01XA CONTUSION OF RIGHT ELBOW, INITIAL ENCOUNTER: Primary | ICD-10-CM

## 2019-09-12 PROCEDURE — 73080 X-RAY EXAM OF ELBOW: CPT

## 2019-09-12 PROCEDURE — 99283 EMERGENCY DEPT VISIT LOW MDM: CPT

## 2019-09-12 RX ORDER — NAPROXEN 500 MG/1
500 TABLET ORAL 2 TIMES DAILY
Qty: 20 TABLET | Refills: 0 | Status: SHIPPED | OUTPATIENT
Start: 2019-09-12 | End: 2021-07-30 | Stop reason: SDUPTHER

## 2019-09-12 ASSESSMENT — PAIN DESCRIPTION - DESCRIPTORS: DESCRIPTORS: ACHING

## 2019-09-12 ASSESSMENT — PAIN DESCRIPTION - LOCATION: LOCATION: ELBOW

## 2019-09-12 ASSESSMENT — PAIN DESCRIPTION - PAIN TYPE: TYPE: ACUTE PAIN

## 2019-09-12 ASSESSMENT — PAIN DESCRIPTION - ORIENTATION: ORIENTATION: RIGHT

## 2019-09-12 ASSESSMENT — PAIN DESCRIPTION - FREQUENCY: FREQUENCY: CONTINUOUS

## 2019-09-12 ASSESSMENT — ENCOUNTER SYMPTOMS
ABDOMINAL PAIN: 0
BACK PAIN: 0
SHORTNESS OF BREATH: 0
COUGH: 0

## 2019-09-12 ASSESSMENT — PAIN SCALES - GENERAL: PAINLEVEL_OUTOF10: 7
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
## 2019-09-12 NOTE — ED PROVIDER NOTES
[facility] ED  eMERGENCY dEPARTMENT eNCOUnter      Pt Name: Wendy Childers  MRN: [id number]  [place] 1985  Date of evaluation: 9/12/2019  Provider: PILAR Hogan CNP      HISTORY OF PRESENT ILLNESS    Wendy Childers is a 29 y.o. male who presents to the Emergency Department with R elbow pain. He hit his elbow in the metal side of his truck 2 weeks ago. He states he was lifting a keg at the time. He has continued to work with the pain. He does not take anything for the pain. Pain is worse with movement and lifting. Pain is moderate. REVIEW OF SYSTEMS       Review of Systems   Constitutional: Negative for fever. HENT: Negative for congestion. Respiratory: Negative for cough and shortness of breath. Cardiovascular: Negative for chest pain. Gastrointestinal: Negative for abdominal pain. Genitourinary: Negative for dysuria. Musculoskeletal: Negative for arthralgias and back pain. R elbow pain   Skin: Negative for rash. All other systems reviewed and are negative. PAST MEDICAL HISTORY     Past Medical History:   Diagnosis Date    Asthma          SURGICAL HISTORY       Past Surgical History:   Procedure Laterality Date    APPENDECTOMY           CURRENT MEDICATIONS       Previous Medications    FAMOTIDINE (PEPCID) 20 MG TABLET    Take 1 tablet by mouth 2 times daily       ALLERGIES     Asa [aspirin]    FAMILY HISTORY     History reviewed. No pertinent family history.        SOCIAL HISTORY       Social History     Socioeconomic History    Marital status:      Spouse name: None    Number of children: None    Years of education: None    Highest education level: None   Occupational History    None   Social Needs    Financial resource strain: None    Food insecurity:     Worry: None     Inability: None    Transportation needs:     Medical: None     Non-medical: None   Tobacco Use    Smoking status: Current Every Day Smoker     Packs/day: 0.50

## 2019-12-16 ENCOUNTER — HOSPITAL ENCOUNTER (EMERGENCY)
Age: 34
Discharge: HOME | End: 2019-12-16
Attending: EMERGENCY MEDICINE
Payer: COMMERCIAL

## 2019-12-16 ENCOUNTER — APPOINTMENT (OUTPATIENT)
Dept: GENERAL RADIOLOGY | Age: 34
End: 2019-12-16
Payer: COMMERCIAL

## 2019-12-16 VITALS
TEMPERATURE: 98.3 F | BODY MASS INDEX: 29.47 KG/M2 | DIASTOLIC BLOOD PRESSURE: 86 MMHG | HEART RATE: 72 BPM | RESPIRATION RATE: 18 BRPM | HEIGHT: 69 IN | OXYGEN SATURATION: 99 % | WEIGHT: 199 LBS | SYSTOLIC BLOOD PRESSURE: 128 MMHG

## 2019-12-16 DIAGNOSIS — R07.89 CHEST WALL PAIN: ICD-10-CM

## 2019-12-16 DIAGNOSIS — J06.9 ACUTE UPPER RESPIRATORY INFECTION: Primary | ICD-10-CM

## 2019-12-16 DIAGNOSIS — J98.01 BRONCHOSPASM: ICD-10-CM

## 2019-12-16 PROCEDURE — 6370000000 HC RX 637 (ALT 250 FOR IP): Performed by: EMERGENCY MEDICINE

## 2019-12-16 PROCEDURE — 71045 X-RAY EXAM CHEST 1 VIEW: CPT

## 2019-12-16 PROCEDURE — 94640 AIRWAY INHALATION TREATMENT: CPT

## 2019-12-16 PROCEDURE — 6360000002 HC RX W HCPCS: Performed by: EMERGENCY MEDICINE

## 2019-12-16 PROCEDURE — 99284 EMERGENCY DEPT VISIT MOD MDM: CPT

## 2019-12-16 RX ORDER — PREDNISONE 10 MG/1
60 TABLET ORAL DAILY
Qty: 30 TABLET | Refills: 0 | Status: SHIPPED | OUTPATIENT
Start: 2019-12-16 | End: 2019-12-21

## 2019-12-16 RX ORDER — AZITHROMYCIN 250 MG/1
TABLET, FILM COATED ORAL
Qty: 1 PACKET | Refills: 0 | Status: SHIPPED | OUTPATIENT
Start: 2019-12-16 | End: 2019-12-20

## 2019-12-16 RX ORDER — PREDNISONE 20 MG/1
60 TABLET ORAL ONCE
Status: COMPLETED | OUTPATIENT
Start: 2019-12-16 | End: 2019-12-16

## 2019-12-16 RX ORDER — ALBUTEROL SULFATE 90 UG/1
AEROSOL, METERED RESPIRATORY (INHALATION)
Qty: 1 INHALER | Refills: 1 | Status: SHIPPED | OUTPATIENT
Start: 2019-12-16 | End: 2022-05-03

## 2019-12-16 RX ADMIN — ALBUTEROL SULFATE 5 MG: 2.5 SOLUTION RESPIRATORY (INHALATION) at 21:20

## 2019-12-16 RX ADMIN — PREDNISONE 60 MG: 20 TABLET ORAL at 21:09

## 2019-12-16 RX ADMIN — IPRATROPIUM BROMIDE 0.5 MG: 0.5 SOLUTION RESPIRATORY (INHALATION) at 21:20

## 2019-12-16 ASSESSMENT — ENCOUNTER SYMPTOMS
NAUSEA: 0
WHEEZING: 1
ABDOMINAL PAIN: 0
BLOOD IN STOOL: 0
EYE PAIN: 0
GASTROINTESTINAL NEGATIVE: 1
EYE DISCHARGE: 0
CHEST TIGHTNESS: 0
VOMITING: 0
DIARRHEA: 0
EYES NEGATIVE: 1
BACK PAIN: 0
ABDOMINAL DISTENTION: 0
SHORTNESS OF BREATH: 1
SORE THROAT: 0
COUGH: 1
SINUS PAIN: 0

## 2019-12-16 ASSESSMENT — PAIN DESCRIPTION - LOCATION: LOCATION: CHEST

## 2019-12-16 ASSESSMENT — PAIN DESCRIPTION - DESCRIPTORS: DESCRIPTORS: BURNING

## 2019-12-16 ASSESSMENT — PAIN SCALES - GENERAL: PAINLEVEL_OUTOF10: 6

## 2019-12-16 ASSESSMENT — PAIN DESCRIPTION - ORIENTATION: ORIENTATION: MID

## 2020-01-01 ENCOUNTER — APPOINTMENT (OUTPATIENT)
Dept: GENERAL RADIOLOGY | Age: 35
End: 2020-01-01

## 2020-01-01 ENCOUNTER — HOSPITAL ENCOUNTER (EMERGENCY)
Age: 35
Discharge: HOME | End: 2020-01-01

## 2020-01-01 VITALS
BODY MASS INDEX: 29.62 KG/M2 | DIASTOLIC BLOOD PRESSURE: 82 MMHG | TEMPERATURE: 98.8 F | SYSTOLIC BLOOD PRESSURE: 139 MMHG | OXYGEN SATURATION: 97 % | HEIGHT: 69 IN | HEART RATE: 93 BPM | WEIGHT: 200 LBS | RESPIRATION RATE: 18 BRPM

## 2020-01-01 PROCEDURE — 73110 X-RAY EXAM OF WRIST: CPT

## 2020-01-01 PROCEDURE — 90471 IMMUNIZATION ADMIN: CPT | Performed by: PHYSICIAN ASSISTANT

## 2020-01-01 PROCEDURE — 73130 X-RAY EXAM OF HAND: CPT

## 2020-01-01 PROCEDURE — 90715 TDAP VACCINE 7 YRS/> IM: CPT | Performed by: PHYSICIAN ASSISTANT

## 2020-01-01 PROCEDURE — 99283 EMERGENCY DEPT VISIT LOW MDM: CPT

## 2020-01-01 PROCEDURE — 6370000000 HC RX 637 (ALT 250 FOR IP): Performed by: PHYSICIAN ASSISTANT

## 2020-01-01 PROCEDURE — 6360000002 HC RX W HCPCS: Performed by: PHYSICIAN ASSISTANT

## 2020-01-01 RX ORDER — HYDROCODONE BITARTRATE AND ACETAMINOPHEN 5; 325 MG/1; MG/1
1 TABLET ORAL ONCE
Status: COMPLETED | OUTPATIENT
Start: 2020-01-01 | End: 2020-01-01

## 2020-01-01 RX ORDER — BACITRACIN, NEOMYCIN, POLYMYXIN B 400; 3.5; 5 [USP'U]/G; MG/G; [USP'U]/G
OINTMENT TOPICAL ONCE
Status: COMPLETED | OUTPATIENT
Start: 2020-01-01 | End: 2020-01-01

## 2020-01-01 RX ADMIN — TETANUS TOXOID, REDUCED DIPHTHERIA TOXOID AND ACELLULAR PERTUSSIS VACCINE, ADSORBED 0.5 ML: 5; 2.5; 8; 8; 2.5 SUSPENSION INTRAMUSCULAR at 17:57

## 2020-01-01 RX ADMIN — BACITRACIN, NEOMYCIN, POLYMYXIN B 2 G: 400; 3.5; 5 OINTMENT TOPICAL at 17:56

## 2020-01-01 RX ADMIN — HYDROCODONE BITARTRATE AND ACETAMINOPHEN 1 TABLET: 5; 325 TABLET ORAL at 17:56

## 2020-01-01 ASSESSMENT — PAIN SCALES - GENERAL
PAINLEVEL_OUTOF10: 10
PAINLEVEL_OUTOF10: 10

## 2020-01-01 ASSESSMENT — PAIN DESCRIPTION - FREQUENCY: FREQUENCY: CONTINUOUS

## 2020-01-01 ASSESSMENT — ENCOUNTER SYMPTOMS
COUGH: 0
SHORTNESS OF BREATH: 0
EYE PAIN: 0
ABDOMINAL PAIN: 0
NAUSEA: 0
PHOTOPHOBIA: 0
RHINORRHEA: 0
DIARRHEA: 0
BACK PAIN: 0
SORE THROAT: 0
VOMITING: 0

## 2020-01-01 ASSESSMENT — PAIN DESCRIPTION - ONSET: ONSET: SUDDEN

## 2020-01-01 ASSESSMENT — PAIN DESCRIPTION - PAIN TYPE: TYPE: ACUTE PAIN

## 2020-01-01 ASSESSMENT — PAIN DESCRIPTION - DESCRIPTORS: DESCRIPTORS: BURNING;NUMBNESS;THROBBING

## 2020-01-01 ASSESSMENT — PAIN DESCRIPTION - LOCATION: LOCATION: HAND

## 2020-01-01 ASSESSMENT — PAIN DESCRIPTION - ORIENTATION: ORIENTATION: RIGHT;LEFT

## 2020-01-01 NOTE — ED PROVIDER NOTES
appearance. He is well-developed. He is not diaphoretic. HENT:      Head: Normocephalic and atraumatic. Eyes:      General: Lids are normal.      Conjunctiva/sclera: Conjunctivae normal.   Neck:      Musculoskeletal: Normal range of motion and neck supple. Cardiovascular:      Rate and Rhythm: Normal rate and regular rhythm. Pulses: Normal pulses. Heart sounds: Normal heart sounds. Pulmonary:      Effort: Pulmonary effort is normal.      Breath sounds: Normal breath sounds. Abdominal:      General: Bowel sounds are normal.      Palpations: Abdomen is soft. Tenderness: There is no tenderness. Musculoskeletal:      Right hand: He exhibits tenderness, bony tenderness and swelling. He exhibits normal range of motion, normal capillary refill, no deformity and no laceration. Left hand: He exhibits tenderness, bony tenderness and swelling. He exhibits normal range of motion, normal two-point discrimination, normal capillary refill and no deformity. Normal sensation noted. Normal strength noted. Hands:    Lymphadenopathy:      Cervical: No cervical adenopathy. Skin:     General: Skin is warm and dry. Capillary Refill: Capillary refill takes less than 2 seconds. Findings: No rash. Neurological:      Mental Status: He is alert and oriented to person, place, and time. Psychiatric:         Thought Content: Thought content normal.         Judgment: Judgment normal.           All other labs were within normal range or not returned as of this dictation. EMERGENCY DEPARTMENT COURSE and DIFFERENTIALDIAGNOSIS/MDM:   Vitals:    Vitals:    01/01/20 1736   BP: 139/82   Pulse: 93   Resp: 18   Temp: 98.8 °F (37.1 °C)   TempSrc: Oral   SpO2: 97%   Weight: 200 lb (90.7 kg)   Height: 5' 9\" (1.753 m)            Patient presents as described. Medicated in the ED for pain. Neurovascularly intact. He can make a fist and open his hand without some pain.   X-ray showed no acute fracture

## 2020-02-16 ASSESSMENT — PAIN DESCRIPTION - DESCRIPTORS: DESCRIPTORS: ACHING

## 2020-02-16 ASSESSMENT — PAIN SCALES - GENERAL: PAINLEVEL_OUTOF10: 7

## 2020-02-16 ASSESSMENT — PAIN DESCRIPTION - PAIN TYPE: TYPE: ACUTE PAIN

## 2020-02-16 ASSESSMENT — PAIN DESCRIPTION - ORIENTATION: ORIENTATION: LEFT

## 2020-02-16 ASSESSMENT — PAIN DESCRIPTION - LOCATION: LOCATION: EAR

## 2020-02-17 ENCOUNTER — HOSPITAL ENCOUNTER (EMERGENCY)
Age: 35
Discharge: HOME | End: 2020-02-17

## 2020-02-17 VITALS
DIASTOLIC BLOOD PRESSURE: 87 MMHG | RESPIRATION RATE: 20 BRPM | TEMPERATURE: 98.1 F | SYSTOLIC BLOOD PRESSURE: 123 MMHG | HEART RATE: 113 BPM | BODY MASS INDEX: 31.25 KG/M2 | WEIGHT: 211 LBS | HEIGHT: 69 IN | OXYGEN SATURATION: 98 %

## 2020-02-17 PROCEDURE — 6370000000 HC RX 637 (ALT 250 FOR IP): Performed by: PHYSICIAN ASSISTANT

## 2020-02-17 PROCEDURE — 99282 EMERGENCY DEPT VISIT SF MDM: CPT

## 2020-02-17 RX ORDER — TRAMADOL HYDROCHLORIDE 50 MG/1
50 TABLET ORAL ONCE
Status: COMPLETED | OUTPATIENT
Start: 2020-02-17 | End: 2020-02-17

## 2020-02-17 RX ORDER — SULFAMETHOXAZOLE AND TRIMETHOPRIM 800; 160 MG/1; MG/1
1 TABLET ORAL ONCE
Status: COMPLETED | OUTPATIENT
Start: 2020-02-17 | End: 2020-02-17

## 2020-02-17 RX ORDER — DIAPER,BRIEF,INFANT-TODD,DISP
EACH MISCELLANEOUS ONCE
Status: COMPLETED | OUTPATIENT
Start: 2020-02-17 | End: 2020-02-17

## 2020-02-17 RX ORDER — TRAMADOL HYDROCHLORIDE 50 MG/1
50 TABLET ORAL EVERY 6 HOURS PRN
Qty: 12 TABLET | Refills: 0 | Status: SHIPPED | OUTPATIENT
Start: 2020-02-17 | End: 2020-02-20

## 2020-02-17 RX ORDER — SULFAMETHOXAZOLE AND TRIMETHOPRIM 800; 160 MG/1; MG/1
1 TABLET ORAL 2 TIMES DAILY
Qty: 20 TABLET | Refills: 0 | Status: SHIPPED | OUTPATIENT
Start: 2020-02-17 | End: 2020-02-27

## 2020-02-17 RX ADMIN — TRAMADOL HYDROCHLORIDE 50 MG: 50 TABLET, FILM COATED ORAL at 00:27

## 2020-02-17 RX ADMIN — SULFAMETHOXAZOLE AND TRIMETHOPRIM 1 TABLET: 800; 160 TABLET ORAL at 00:27

## 2020-02-17 RX ADMIN — BACITRACIN ZINC 1 G: 500 OINTMENT TOPICAL at 00:27

## 2020-02-17 ASSESSMENT — ENCOUNTER SYMPTOMS
SHORTNESS OF BREATH: 0
EYE PAIN: 0
ALLERGIC/IMMUNOLOGIC NEGATIVE: 1
ABDOMINAL PAIN: 0
APNEA: 0
TROUBLE SWALLOWING: 0
COLOR CHANGE: 0

## 2020-02-17 ASSESSMENT — PAIN SCALES - GENERAL: PAINLEVEL_OUTOF10: 7

## 2020-02-17 NOTE — ED TRIAGE NOTES
Pt states that he has been having left ear lobe pain that started today.  Pt states that he has had discharge from the area

## 2020-02-17 NOTE — ED PROVIDER NOTES
[facility] ED  eMERGENCYdEPARTMENT eNCOUnter      Pt Name: Dale Apodaca  MRN: [id number]  [place] 1985  Date of evaluation: 2/16/2020  Provider:Prateek Patricia PA-C    CHIEF COMPLAINT       Chief Complaint   Patient presents with    Facial Swelling     left ear pain lobe         HISTORY OF PRESENT ILLNESS  (Location/Symptom, Timing/Onset, Context/Setting, Quality, Duration, Modifying Factors, Severity.)   Dale Apodaca is a 29 y.o. male who presents to the emergency department complaints of pain and swelling to the bilateral earlobes, left greater than right after changing the gauging of his ear piercings. Patient denies any fevers. Patient states that the pain is moderate in severity with touch and has had some bloody drainage and his earlobes are red. HPI    Nursing Notes were reviewed and I agree. REVIEW OF SYSTEMS    (2-9 systems for level 4, 10 or more for level 5)     Review of Systems   Constitutional: Negative for diaphoresis and fever. HENT: Positive for ear pain. Negative for hearing loss and trouble swallowing. Eyes: Negative for pain. Respiratory: Negative for apnea and shortness of breath. Cardiovascular: Negative for chest pain. Gastrointestinal: Negative for abdominal pain. Endocrine: Negative. Genitourinary: Negative for hematuria. Musculoskeletal: Negative for neck pain and neck stiffness. Skin: Negative for color change. Allergic/Immunologic: Negative. Neurological: Negative for dizziness and numbness. Hematological: Negative. Psychiatric/Behavioral: Negative. All other systems reviewed and are negative. Except as noted above the remainder of the review of systems was reviewed and negative.        PAST MEDICAL HISTORY     Past Medical History:   Diagnosis Date    Asthma          SURGICAL HISTORY       Past Surgical History:   Procedure Laterality Date    APPENDECTOMY           CURRENT MEDICATIONS       Previous Medications    ALBUTEROL SULFATE HFA (PROAIR HFA) 108 (90 BASE) MCG/ACT INHALER    Use every 4 hours while awake for 7-10 days then PRN wheezing  Dispense with SPACER and Instruct on use. May sub Ventolin or Proventil as needed per [employer]. FAMOTIDINE (PEPCID) 20 MG TABLET    Take 1 tablet by mouth 2 times daily    NAPROXEN (NAPROSYN) 500 MG TABLET    Take 1 tablet by mouth 2 times daily for 20 doses       ALLERGIES     Asa [aspirin]    FAMILY HISTORY     History reviewed. No pertinent family history.        SOCIAL HISTORY       Social History     Socioeconomic History    Marital status: Single     Spouse name: None    Number of children: None    Years of education: None    Highest education level: None   Occupational History    None   Social Needs    Financial resource strain: None    Food insecurity:     Worry: None     Inability: None    Transportation needs:     Medical: None     Non-medical: None   Tobacco Use    Smoking status: Current Every Day Smoker     Packs/day: 0.50     Types: Cigarettes    Smokeless tobacco: Never Used   Substance and Sexual Activity    Alcohol use: Yes     Comment: socially    Drug use: Never    Sexual activity: Yes     Partners: Female   Lifestyle    Physical activity:     Days per week: None     Minutes per session: None    Stress: None   Relationships    Social connections:     Talks on phone: None     Gets together: None     Attends Lutheran service: None     Active member of club or organization: None     Attends meetings of clubs or organizations: None     Relationship status: None    Intimate partner violence:     Fear of current or ex partner: None     Emotionally abused: None     Physically abused: None     Forced sexual activity: None   Other Topics Concern    None   Social History Narrative    None       SCREENINGS           PHYSICAL EXAM    (up to 7 forlevel 4, 8 or more for level 5)     ED Triage Vitals [02/17/20 0000]   BP Temp Temp Source Pulse Resp SpO2 Height Weight 123/87 98.1 °F (36.7 °C) Oral 113 20 98 % 5' 9\" (1.753 m) 211 lb (95.7 kg)       Physical Exam  Vitals signs and nursing note reviewed. Constitutional:       General: He is not in acute distress. Appearance: He is well-developed. He is not diaphoretic. HENT:      Head: Normocephalic and atraumatic. Right Ear: Swelling and tenderness present. Left Ear: Swelling and tenderness present. Ears:        Mouth/Throat:      Pharynx: No oropharyngeal exudate. Eyes:      General: No scleral icterus. Conjunctiva/sclera: Conjunctivae normal.      Pupils: Pupils are equal, round, and reactive to light. Neck:      Musculoskeletal: Normal range of motion and neck supple. Trachea: No tracheal deviation. Cardiovascular:      Rate and Rhythm: Normal rate. Heart sounds: Normal heart sounds. Pulmonary:      Effort: Pulmonary effort is normal. No respiratory distress. Breath sounds: Normal breath sounds. Abdominal:      General: Bowel sounds are normal. There is no distension. Palpations: Abdomen is soft. Musculoskeletal: Normal range of motion. Skin:     General: Skin is warm and dry. Findings: No erythema or rash. Neurological:      Mental Status: He is alert and oriented to person, place, and time. Cranial Nerves: No cranial nerve deficit. Motor: No abnormal muscle tone. Psychiatric:         Behavior: Behavior normal.         Thought Content:  Thought content normal.         Judgment: Judgment normal.           DIAGNOSTIC RESULTS     RADIOLOGY:   Non-plain film images such as CT, Ultrasound and MRI are read by the radiologist. Plain radiographic images are visualized and preliminarilyinterpreted by Vania Steele PA-C with the below findings:      Interpretation per the Radiologist below, if available at the time of this note:    No orders to display       LABS:  Labs Reviewed - No data to display    All other labs were within normal range or not returnedas of this dictation. EMERGENCYDEPARTMENT COURSE and DIFFERENTIAL DIAGNOSIS/MDM:   Vitals:    Vitals:    02/17/20 0000   BP: 123/87   Pulse: 113   Resp: 20   Temp: 98.1 °F (36.7 °C)   TempSrc: Oral   SpO2: 98%   Weight: 211 lb (95.7 kg)   Height: 5' 9\" (1.753 m)       REASSESSMENT      Advised patient on washing, using antibiotic ointment and oral antibiotics for infected earlobes. Do not insert any ear piercings and until lobes are fully healed    MDM    PROCEDURES:    Procedures      FINAL IMPRESSION      1. Infection of skin of both ear lobes          DISPOSITION/PLAN   DISPOSITION Decision To Discharge 02/17/2020 12:25:35 AM      PATIENT REFERRED TO:  Michael De La Vega MD  [address] [place] Alegria [address] [place] [id number]    In 3 days        DISCHARGE MEDICATIONS:  New Prescriptions    SULFAMETHOXAZOLE-TRIMETHOPRIM (BACTRIM DS) 800-160 MG PER TABLET    Take 1 tablet by mouth 2 times daily for 10 days    TRAMADOL (ULTRAM) 50 MG TABLET    Take 1 tablet by mouth every 6 hours as needed for Pain for up to 3 days. Intended supply: 3 days.  Take lowest dose possible to manage pain       (Please note that portions of this note were completed with a voice recognition program.  Efforts were made to edit the dictations but occasionally words are mis-transcribed.)    KIT Renae PA-C  02/17/20 4448

## 2020-02-17 NOTE — ED NOTES
Pt was medicated per order and bacitracin was spread onto both ears, inside and out. Pt's discharge instructions and prescriptions were reviewed. Pt had no further questions and was discharged with all of his belongings.       Jac Killian RN  02/17/20 0502

## 2020-03-20 ENCOUNTER — NURSE TRIAGE (OUTPATIENT)
Dept: OTHER | Facility: CLINIC | Age: 35
End: 2020-03-20

## 2020-03-20 ENCOUNTER — APPOINTMENT (OUTPATIENT)
Dept: GENERAL RADIOLOGY | Age: 35
End: 2020-03-20

## 2020-03-20 ENCOUNTER — HOSPITAL ENCOUNTER (EMERGENCY)
Age: 35
Discharge: HOME | End: 2020-03-20
Attending: EMERGENCY MEDICINE

## 2020-03-20 VITALS
DIASTOLIC BLOOD PRESSURE: 95 MMHG | HEART RATE: 79 BPM | BODY MASS INDEX: 31.7 KG/M2 | TEMPERATURE: 98.1 F | WEIGHT: 214 LBS | RESPIRATION RATE: 18 BRPM | SYSTOLIC BLOOD PRESSURE: 143 MMHG | HEIGHT: 69 IN | OXYGEN SATURATION: 98 %

## 2020-03-20 LAB
INFLUENZA A BY PCR: NEGATIVE
INFLUENZA B BY PCR: NEGATIVE

## 2020-03-20 PROCEDURE — 99284 EMERGENCY DEPT VISIT MOD MDM: CPT

## 2020-03-20 PROCEDURE — 71045 X-RAY EXAM CHEST 1 VIEW: CPT

## 2020-03-20 PROCEDURE — 87502 INFLUENZA DNA AMP PROBE: CPT

## 2020-03-20 PROCEDURE — 6370000000 HC RX 637 (ALT 250 FOR IP): Performed by: PERSONAL EMERGENCY RESPONSE ATTENDANT

## 2020-03-20 RX ORDER — NEBULIZER ACCESSORIES
1 KIT MISCELLANEOUS DAILY PRN
Qty: 1 KIT | Refills: 0 | Status: SHIPPED | OUTPATIENT
Start: 2020-03-20 | End: 2022-05-03

## 2020-03-20 RX ORDER — ALBUTEROL SULFATE 2.5 MG/3ML
2.5 SOLUTION RESPIRATORY (INHALATION) EVERY 6 HOURS PRN
Qty: 120 EACH | Refills: 0 | Status: SHIPPED | OUTPATIENT
Start: 2020-03-20 | End: 2022-05-03 | Stop reason: ALTCHOICE

## 2020-03-20 RX ORDER — ACETAMINOPHEN 500 MG
1000 TABLET ORAL ONCE
Status: COMPLETED | OUTPATIENT
Start: 2020-03-20 | End: 2020-03-20

## 2020-03-20 RX ORDER — AZITHROMYCIN 250 MG/1
TABLET, FILM COATED ORAL
Qty: 1 PACKET | Refills: 0 | Status: SHIPPED | OUTPATIENT
Start: 2020-03-20 | End: 2020-03-30

## 2020-03-20 RX ORDER — IPRATROPIUM BROMIDE 21 UG/1
2 SPRAY, METERED NASAL EVERY 12 HOURS
Qty: 1 BOTTLE | Refills: 0 | Status: SHIPPED | OUTPATIENT
Start: 2020-03-20 | End: 2021-09-27

## 2020-03-20 RX ORDER — IPRATROPIUM BROMIDE AND ALBUTEROL SULFATE 2.5; .5 MG/3ML; MG/3ML
1 SOLUTION RESPIRATORY (INHALATION) CONTINUOUS PRN
Status: DISCONTINUED | OUTPATIENT
Start: 2020-03-20 | End: 2020-03-20

## 2020-03-20 RX ORDER — PREDNISONE 20 MG/1
40 TABLET ORAL DAILY
Qty: 10 TABLET | Refills: 0 | Status: SHIPPED | OUTPATIENT
Start: 2020-03-20 | End: 2020-03-25

## 2020-03-20 RX ORDER — PREDNISONE 20 MG/1
40 TABLET ORAL ONCE
Status: COMPLETED | OUTPATIENT
Start: 2020-03-20 | End: 2020-03-20

## 2020-03-20 RX ADMIN — ACETAMINOPHEN 1000 MG: 500 TABLET ORAL at 04:52

## 2020-03-20 RX ADMIN — PREDNISONE 40 MG: 20 TABLET ORAL at 04:52

## 2020-03-20 ASSESSMENT — PAIN DESCRIPTION - LOCATION: LOCATION: CHEST

## 2020-03-20 ASSESSMENT — ENCOUNTER SYMPTOMS
VOMITING: 0
SHORTNESS OF BREATH: 1
BLOOD IN STOOL: 0
DIARRHEA: 0
RHINORRHEA: 0
COLOR CHANGE: 0
WHEEZING: 1
SORE THROAT: 0
ABDOMINAL PAIN: 0
NAUSEA: 0
COUGH: 1

## 2020-03-20 ASSESSMENT — PAIN DESCRIPTION - PAIN TYPE: TYPE: ACUTE PAIN

## 2020-03-20 ASSESSMENT — PAIN SCALES - GENERAL
PAINLEVEL_OUTOF10: 5
PAINLEVEL_OUTOF10: 5

## 2020-03-20 ASSESSMENT — PAIN DESCRIPTION - DESCRIPTORS: DESCRIPTORS: ACHING

## 2020-03-20 ASSESSMENT — PAIN DESCRIPTION - ORIENTATION: ORIENTATION: LEFT

## 2020-03-20 NOTE — ED TRIAGE NOTES
Patient c/o cough x 3 days, coughing up yellow sputum in mornings, states he recently was in [place], he called a corona virus hotline tonight and they advised him to come to the er,, he has left sided chest pain with cough. He last did his inhaler 3 hours ago.

## 2020-03-20 NOTE — ED PROVIDER NOTES
[address] [facility] ED  eMERGENCY dEPARTMENT eNCOUnter      Pt Name: John Veloz  MRN: [id number]  [place] 1985  Date of evaluation: 3/20/2020  Provider: JONI Sanchez      HISTORY OF PRESENT ILLNESS    John Veloz is a 29 y.o. male with PMHx of asthma presents to the emergency department with URI symptoms. Patient states for the past couple days he has had upper respiratory symptoms. He has had nasal congestion, yellow productive cough in the morning which is dry throughout the day, left anterior chest pain present with coughing, and shortness of breath with wheezing at home. Inhaler is minimally beneficial.  He spoke to [employer] [facility] who advised him to come to the ER. He did recently travel to [place]. No known ill contacts or positive contact with corona. He denies fevers. HPI    Nursing Notes were reviewed. REVIEW OF SYSTEMS       Review of Systems   Constitutional: Negative for appetite change, chills and fever. HENT: Positive for congestion. Negative for rhinorrhea and sore throat. Respiratory: Positive for cough, shortness of breath and wheezing. Cardiovascular: Positive for chest pain. Gastrointestinal: Negative for abdominal pain, blood in stool, diarrhea, nausea and vomiting. Genitourinary: Negative for difficulty urinating. Musculoskeletal: Negative for neck stiffness. Skin: Negative for color change and rash. Neurological: Negative for dizziness, syncope, weakness, light-headedness, numbness and headaches. All other systems reviewed and are negative.             PAST MEDICAL HISTORY     Past Medical History:   Diagnosis Date    Asthma          SURGICAL HISTORY       Past Surgical History:   Procedure Laterality Date    APPENDECTOMY           CURRENT MEDICATIONS       Previous Medications    ALBUTEROL SULFATE HFA (PROAIR HFA) 108 (90 BASE) MCG/ACT INHALER    Use every 4 hours while awake for 7-10 days then PRN wheezing  Dispense with SPACER and Instruct Tympanic membrane normal.      Left Ear: Tympanic membrane normal.      Mouth/Throat:      Pharynx: No oropharyngeal exudate or posterior oropharyngeal erythema. Eyes:      Conjunctiva/sclera: Conjunctivae normal.      Pupils: Pupils are equal, round, and reactive to light. Neck:      Musculoskeletal: Normal range of motion and neck supple. Trachea: No tracheal deviation. Cardiovascular:      Heart sounds: Normal heart sounds. Pulmonary:      Effort: Pulmonary effort is normal. No respiratory distress. Breath sounds: Normal breath sounds. No stridor. Abdominal:      General: Bowel sounds are normal. There is no distension. Palpations: Abdomen is soft. There is no mass. Tenderness: There is no abdominal tenderness. There is no guarding or rebound. Musculoskeletal: Normal range of motion. Skin:     General: Skin is warm and dry. Capillary Refill: Capillary refill takes less than 2 seconds. Findings: No rash. Neurological:      Mental Status: He is alert and oriented to person, place, and time. Deep Tendon Reflexes: Reflexes are normal and symmetric. Psychiatric:         Behavior: Behavior normal.         Thought Content: Thought content normal.         Judgment: Judgment normal.         DIAGNOSTIC RESULTS     EKG:All EKG's are interpreted by the Emergency Department Physician who either signs or Co-signs this chart in the absence of a cardiologist.        RADIOLOGY:   Non-plain film images such as CT, Ultrasound and MRI are read by theradiologist. Plain radiographic images are visualized and preliminarily interpreted by the emergency physician with the below findings:    Interpretation per theRadiologist below, if available at the time of this note:    XR CHEST PORTABLE    (Results Pending)           LABS:  Labs Reviewed   RAPID INFLUENZA A/B ANTIGENS       All other labs were within normal range or not returned as of this dictation.     EMERGENCY DEPARTMENT COURSE ALBUTEROL (PROVENTIL) (2.5 MG/3ML) 0.083% NEBULIZER SOLUTION    Take 3 mLs by nebulization every 6 hours as needed for Wheezing    AZITHROMYCIN (ZITHROMAX) 250 MG TABLET    Take 2 tablets (500 mg) on Day 1, followed by 1 tablet (250 mg) once daily on Days 2 through 5. IPRATROPIUM (ATROVENT) 0.03 % NASAL SPRAY    2 sprays by Each Nostril route every 12 hours    PREDNISONE (DELTASONE) 20 MG TABLET    Take 2 tablets by mouth daily for 5 doses    RESPIRATORY THERAPY SUPPLIES (NEBULIZER/TUBING/MOUTHPIECE) KIT    1 kit by Does not apply route daily as needed (sob every 4-6 hours)          (Please notethat portions of this note were completed with a voice recognition program.  Efforts were made to edit the dictations but occasionally words are mis-transcribed. )    JONI Owusu (electronically signed)  Emergency Physician Assistant         Anastasia Monma  72/24/45 [phone]

## 2020-06-10 ENCOUNTER — HOSPITAL ENCOUNTER (EMERGENCY)
Age: 35
Discharge: HOME | End: 2020-06-10
Attending: EMERGENCY MEDICINE
Payer: MEDICAID

## 2020-06-10 ENCOUNTER — APPOINTMENT (OUTPATIENT)
Dept: GENERAL RADIOLOGY | Age: 35
End: 2020-06-10
Payer: MEDICAID

## 2020-06-10 VITALS
DIASTOLIC BLOOD PRESSURE: 94 MMHG | HEART RATE: 73 BPM | BODY MASS INDEX: 32.58 KG/M2 | RESPIRATION RATE: 16 BRPM | SYSTOLIC BLOOD PRESSURE: 124 MMHG | TEMPERATURE: 98.3 F | HEIGHT: 69 IN | OXYGEN SATURATION: 97 % | WEIGHT: 220 LBS

## 2020-06-10 LAB
EKG ATRIAL RATE: 73 BPM
EKG P AXIS: 23 DEGREES
EKG P-R INTERVAL: 138 MS
EKG Q-T INTERVAL: 394 MS
EKG QRS DURATION: 90 MS
EKG QTC CALCULATION (BAZETT): 434 MS
EKG R AXIS: 33 DEGREES
EKG T AXIS: 33 DEGREES
EKG VENTRICULAR RATE: 73 BPM

## 2020-06-10 PROCEDURE — 93005 ELECTROCARDIOGRAM TRACING: CPT | Performed by: EMERGENCY MEDICINE

## 2020-06-10 PROCEDURE — 6370000000 HC RX 637 (ALT 250 FOR IP): Performed by: EMERGENCY MEDICINE

## 2020-06-10 PROCEDURE — 71046 X-RAY EXAM CHEST 2 VIEWS: CPT

## 2020-06-10 PROCEDURE — 6360000002 HC RX W HCPCS: Performed by: EMERGENCY MEDICINE

## 2020-06-10 PROCEDURE — 99283 EMERGENCY DEPT VISIT LOW MDM: CPT

## 2020-06-10 RX ORDER — IBUPROFEN 800 MG/1
800 TABLET ORAL ONCE
Status: COMPLETED | OUTPATIENT
Start: 2020-06-10 | End: 2020-06-10

## 2020-06-10 RX ORDER — DEXAMETHASONE SODIUM PHOSPHATE 4 MG/ML
10 INJECTION, SOLUTION INTRA-ARTICULAR; INTRALESIONAL; INTRAMUSCULAR; INTRAVENOUS; SOFT TISSUE ONCE
Status: COMPLETED | OUTPATIENT
Start: 2020-06-10 | End: 2020-06-10

## 2020-06-10 RX ADMIN — DEXAMETHASONE SODIUM PHOSPHATE 10 MG: 4 INJECTION, SOLUTION INTRAMUSCULAR; INTRAVENOUS at 01:13

## 2020-06-10 RX ADMIN — IBUPROFEN 800 MG: 800 TABLET, FILM COATED ORAL at 01:38

## 2020-06-10 ASSESSMENT — PAIN DESCRIPTION - PAIN TYPE
TYPE: ACUTE PAIN
TYPE: ACUTE PAIN;CHRONIC PAIN

## 2020-06-10 ASSESSMENT — PAIN DESCRIPTION - DESCRIPTORS
DESCRIPTORS: ACHING
DESCRIPTORS: ACHING

## 2020-06-10 ASSESSMENT — PAIN SCALES - GENERAL
PAINLEVEL_OUTOF10: 6
PAINLEVEL_OUTOF10: 6
PAINLEVEL_OUTOF10: 5

## 2020-06-10 ASSESSMENT — PAIN DESCRIPTION - ORIENTATION: ORIENTATION: MID

## 2020-06-10 ASSESSMENT — PAIN DESCRIPTION - FREQUENCY: FREQUENCY: CONTINUOUS

## 2020-06-10 ASSESSMENT — PAIN DESCRIPTION - LOCATION
LOCATION: CHEST
LOCATION: CHEST

## 2020-06-10 ASSESSMENT — ENCOUNTER SYMPTOMS: COUGH: 1

## 2020-06-11 ENCOUNTER — CARE COORDINATION (OUTPATIENT)
Dept: CARE COORDINATION | Age: 35
End: 2020-06-11

## 2020-06-12 PROCEDURE — 93010 ELECTROCARDIOGRAM REPORT: CPT | Performed by: INTERNAL MEDICINE

## 2020-06-16 ENCOUNTER — CARE COORDINATION (OUTPATIENT)
Dept: CARE COORDINATION | Age: 35
End: 2020-06-16

## 2020-06-23 ENCOUNTER — CARE COORDINATION (OUTPATIENT)
Dept: CARE COORDINATION | Age: 35
End: 2020-06-23

## 2020-06-23 NOTE — CARE COORDINATION
Call to [facility] for covid follow up. Noted no show for pcp appt today.  Left message to return call

## 2020-06-27 ENCOUNTER — CARE COORDINATION (OUTPATIENT)
Dept: CARE COORDINATION | Age: 35
End: 2020-06-27

## 2021-07-30 ENCOUNTER — HOSPITAL ENCOUNTER (EMERGENCY)
Age: 36
Discharge: HOME | End: 2021-07-30
Attending: EMERGENCY MEDICINE
Payer: MEDICAID

## 2021-07-30 VITALS
WEIGHT: 255 LBS | DIASTOLIC BLOOD PRESSURE: 84 MMHG | RESPIRATION RATE: 18 BRPM | HEIGHT: 69 IN | BODY MASS INDEX: 37.77 KG/M2 | SYSTOLIC BLOOD PRESSURE: 143 MMHG | TEMPERATURE: 98 F | OXYGEN SATURATION: 96 % | HEART RATE: 84 BPM

## 2021-07-30 DIAGNOSIS — H60.392 INFECTIVE OTITIS EXTERNA OF LEFT EAR: Primary | ICD-10-CM

## 2021-07-30 LAB
CHP ED QC CHECK: NORMAL
GLUCOSE BLD-MCNC: 94 MG/DL
GLUCOSE BLD-MCNC: 94 MG/DL (ref 60–115)
PERFORMED ON: NORMAL

## 2021-07-30 PROCEDURE — 99285 EMERGENCY DEPT VISIT HI MDM: CPT

## 2021-07-30 PROCEDURE — 6370000000 HC RX 637 (ALT 250 FOR IP): Performed by: EMERGENCY MEDICINE

## 2021-07-30 RX ORDER — AMOXICILLIN AND CLAVULANATE POTASSIUM 875; 125 MG/1; MG/1
1 TABLET, FILM COATED ORAL ONCE
Status: COMPLETED | OUTPATIENT
Start: 2021-07-30 | End: 2021-07-30

## 2021-07-30 RX ORDER — AMOXICILLIN AND CLAVULANATE POTASSIUM 875; 125 MG/1; MG/1
1 TABLET, FILM COATED ORAL EVERY 12 HOURS
Qty: 20 TABLET | Refills: 0 | Status: SHIPPED | OUTPATIENT
Start: 2021-07-30 | End: 2021-08-09

## 2021-07-30 RX ORDER — NAPROXEN 500 MG/1
500 TABLET ORAL 2 TIMES DAILY
Qty: 20 TABLET | Refills: 0 | Status: SHIPPED | OUTPATIENT
Start: 2021-07-30 | End: 2021-12-31 | Stop reason: SDUPTHER

## 2021-07-30 RX ORDER — NAPROXEN 250 MG/1
500 TABLET ORAL ONCE
Status: COMPLETED | OUTPATIENT
Start: 2021-07-30 | End: 2021-07-30

## 2021-07-30 RX ADMIN — AMOXICILLIN AND CLAVULANATE POTASSIUM 1 TABLET: 875; 125 TABLET, FILM COATED ORAL at 19:15

## 2021-07-30 RX ADMIN — NAPROXEN 500 MG: 250 TABLET ORAL at 19:13

## 2021-07-30 ASSESSMENT — PAIN DESCRIPTION - LOCATION: LOCATION: EAR;HEAD

## 2021-07-30 ASSESSMENT — PAIN DESCRIPTION - PAIN TYPE: TYPE: ACUTE PAIN

## 2021-07-30 ASSESSMENT — PAIN DESCRIPTION - ORIENTATION: ORIENTATION: LEFT

## 2021-07-30 ASSESSMENT — PAIN SCALES - GENERAL
PAINLEVEL_OUTOF10: 7
PAINLEVEL_OUTOF10: 6

## 2021-07-30 NOTE — ED TRIAGE NOTES
Pt to ed from home via triage with c/o left ear pain  Pt reports onset of pain about a week ago, worsening  Pt reports headache onset 3 days ago. Pt denies home analgesic use. On arrival pt skin WDI, respirations even and unlabored   Pt calm and cooperative, alert and oriented. No s/s of acute distress noted.

## 2021-07-30 NOTE — ED PROVIDER NOTES
eMERGENCY dEPARTMENT eNCOUnter      [facility]    Chief Complaint   Patient presents with    Otalgia     left ear, 1 week, with headache 3 days       HPI    Jose Griffin is a 39 y.o. male  With hx of asthma who presentsto ED from home   By private car  With complaint of left ear pain   Onset 1 week ago  Intensity of symptoms moderate   Location of symptoms left ear   Patient denies fever chills   He also noticed some discharge from the left ear      PAST MEDICAL HISTORY    Past Medical History:   Diagnosis Date    Asthma        SURGICAL HISTORY    Past Surgical History:   Procedure Laterality Date    APPENDECTOMY         CURRENT MEDICATIONS    Current Outpatient Rx   Medication Sig Dispense Refill    amoxicillin-clavulanate (AUGMENTIN) 875-125 MG per tablet Take 1 tablet by mouth every 12 hours for 10 days 20 tablet 0    naproxen (NAPROSYN) 500 MG tablet Take 1 tablet by mouth 2 times daily for 10 days 20 tablet 0    neomycin-polymyxin-hydrocortisone (CORTISPORIN) 3.5-37515-1 otic solution Place 4 drops into the left ear 3 times daily for 10 days Instill into R Ear 1 each 0    Respiratory Therapy Supplies (NEBULIZER/TUBING/MOUTHPIECE) KIT 1 kit by Does not apply route daily as needed (sob every 4-6 hours) 1 kit 0    albuterol (PROVENTIL) (2.5 MG/3ML) 0.083% nebulizer solution Take 3 mLs by nebulization every 6 hours as needed for Wheezing 120 each 0    ipratropium (ATROVENT) 0.03 % nasal spray 2 sprays by Each Nostril route every 12 hours 1 Bottle 0    albuterol sulfate HFA (PROAIR HFA) 108 (90 Base) MCG/ACT inhaler Use every 4 hours while awake for 7-10 days then PRN wheezing  Dispense with SPACER and Instruct on use. May sub Ventolin or Proventil as needed per [employer]. 1 Inhaler 1    famotidine (PEPCID) 20 MG tablet Take 1 tablet by mouth 2 times daily 20 tablet 0       ALLERGIES    Allergies   Allergen Reactions    Asa [Aspirin]        FAMILY HISTORY    History reviewed.  No pertinent family history. SOCIAL HISTORY    Social History     Socioeconomic History    Marital status: Single     Spouse name: None    Number of children: None    Years of education: None    Highest education level: None   Occupational History    None   Tobacco Use    Smoking status: Current Every Day Smoker     Packs/day: 0.00     Types: E-Cigarettes    Smokeless tobacco: Never Used   Vaping Use    Vaping Use: Every day    Substances: Nicotine    Devices: Pre-filled pod   Substance and Sexual Activity    Alcohol use: Yes     Comment: socially    Drug use: Never    Sexual activity: Yes     Partners: Female   Other Topics Concern    None   Social History Narrative    None     Social Determinants of Health     Financial Resource Strain:     Difficulty of Paying Living Expenses:    Food Insecurity:     Worried About Running Out of Food in the Last Year:     Ran Out of Food in the Last Year:    Transportation Needs:     Lack of Transportation (Medical):      Lack of Transportation (Non-Medical):    Physical Activity:     Days of Exercise per Week:     Minutes of Exercise per Session:    Stress:     Feeling of Stress :    Social Connections:     Frequency of Communication with Friends and Family:     Frequency of Social Gatherings with Friends and Family:     Attends [employer]ist Services:     Active Member of Clubs or Organizations:     Attends Club or Organization Meetings:     Marital Status:    Intimate Partner Violence:     Fear of Current or Ex-Partner:     Emotionally Abused:     Physically Abused:     Sexually Abused:        REVIEW OF SYSTEMS    Constitutional:  Denies fever, chills, weight loss or weakness   Eyes:  Denies photophobia or discharge   HENT:  Denies sore throat but c/o Left ear pain   Respiratory:  Denies cough or shortness of breath   Cardiovascular:  Denies chest pain, palpitations or swelling   GI:  Denies abdominal pain, nausea, vomiting, or diarrhea   Musculoskeletal:  Denies back pain   Skin:  Denies rash   Neurologic:  Denies headache, focal weakness or sensory changes   Endocrine:  Denies polyuria or polydypsia   Lymphatic:  Denies swollen glands   Psychiatric:  Denies depression, suicidal ideation or homicidal ideation   All systems negative except as marked. PHYSICAL EXAM    VITAL SIGNS: BP (!) 143/84   Pulse 84   Temp 98 °F (36.7 °C) (Oral)   Resp 18   Ht 5' 9\" (1.753 m)   Wt 255 lb (115.7 kg)   SpO2 96%   BMI 37.66 kg/m²    Constitutional:  Well developed, Well nourished, No acute distress, Non-toxic appearance. HENT:  Normocephalic, Atraumatic, Left external ear erythematous with slight discharge present ,No mastoid tenderness,  Oropharynx moist, No oral exudates, Nose normal. Neck- Normal range of motion, No tenderness, Supple, No stridor. Eyes:  PERRL, EOMI, Conjunctiva normal, No discharge. Respiratory:  Normal breath sounds, No respiratory distress, No wheezing, No chest tenderness. Cardiovascular:  Normal heart rate, Normal rhythm, No murmurs, No rubs, No gallops. GI:  Bowel sounds normal, Soft, No tenderness, No masses, No pulsatile masses. : No CVA tenderness. Musculoskeletal:  Intact distal pulses, No edema, No tenderness, No cyanosis, No clubbing. Good range of motion in all major joints. No tenderness to palpation or major deformities noted. Back- No tenderness. Integument:  Warm, Dry, No erythema, No rash. Lymphatic:  No lymphadenopathy noted. Neurologic:  Alert & oriented x 3, Normal motor function, Normal sensory function, No focal deficits noted.    Psychiatric:  Affect normal, Judgment normal, Mood normal.       RADIOLOGY    No orders to display       REEVALUATION   Pain control adequate     Labs  Labs Reviewed   POCT GLUCOSE             Summation      Patient Course:     ED Medications administered this visit:    Medications   naproxen (NAPROSYN) tablet 500 mg (has no administration in time range)   amoxicillin-clavulanate (AUGMENTIN) 875-125 MG per tablet 1 tablet (has no administration in time range)       New Prescriptions from this visit:    New Prescriptions    AMOXICILLIN-CLAVULANATE (AUGMENTIN) 875-125 MG PER TABLET    Take 1 tablet by mouth every 12 hours for 10 days    NAPROXEN (NAPROSYN) 500 MG TABLET    Take 1 tablet by mouth 2 times daily for 10 days    NEOMYCIN-POLYMYXIN-HYDROCORTISONE (CORTISPORIN) 3.5-41896-7 OTIC SOLUTION    Place 4 drops into the left ear 3 times daily for 10 days Instill into R Ear       Follow-up:  [address] [place] [address]. [address] [place] [address]  [phone]    Call in 1 day          Final Impression:   1.  Infective otitis externa of left ear               (Please note that portions of this note were completed with a voice recognition program.  Efforts were made to edit the dictations but occasionally words are mis-transcribed.)            Rose Del Toro MD  07/30/21 1015

## 2021-09-27 ENCOUNTER — OFFICE VISIT (OUTPATIENT)
Dept: FAMILY MEDICINE CLINIC | Age: 36
End: 2021-09-27
Payer: MEDICAID

## 2021-09-27 VITALS
DIASTOLIC BLOOD PRESSURE: 74 MMHG | HEART RATE: 85 BPM | WEIGHT: 254 LBS | HEIGHT: 69 IN | TEMPERATURE: 98.5 F | BODY MASS INDEX: 37.62 KG/M2 | SYSTOLIC BLOOD PRESSURE: 116 MMHG | OXYGEN SATURATION: 96 %

## 2021-09-27 DIAGNOSIS — Z11.4 ENCOUNTER FOR SCREENING FOR HIV: ICD-10-CM

## 2021-09-27 DIAGNOSIS — I83.813 VARICOSE VEINS OF BOTH LOWER EXTREMITIES WITH PAIN: ICD-10-CM

## 2021-09-27 DIAGNOSIS — K21.9 GASTROESOPHAGEAL REFLUX DISEASE WITHOUT ESOPHAGITIS: Primary | ICD-10-CM

## 2021-09-27 DIAGNOSIS — Z23 NEED FOR INFLUENZA VACCINATION: ICD-10-CM

## 2021-09-27 DIAGNOSIS — H61.21 IMPACTED CERUMEN OF RIGHT EAR: ICD-10-CM

## 2021-09-27 DIAGNOSIS — L30.9 DERMATITIS: ICD-10-CM

## 2021-09-27 DIAGNOSIS — Z11.59 NEED FOR HEPATITIS C SCREENING TEST: ICD-10-CM

## 2021-09-27 DIAGNOSIS — L03.90 CELLULITIS, UNSPECIFIED CELLULITIS SITE: ICD-10-CM

## 2021-09-27 DIAGNOSIS — M54.50 LUMBAR BACK PAIN: ICD-10-CM

## 2021-09-27 PROCEDURE — 4004F PT TOBACCO SCREEN RCVD TLK: CPT | Performed by: FAMILY MEDICINE

## 2021-09-27 PROCEDURE — G8427 DOCREV CUR MEDS BY ELIG CLIN: HCPCS | Performed by: FAMILY MEDICINE

## 2021-09-27 PROCEDURE — 90471 IMMUNIZATION ADMIN: CPT | Performed by: FAMILY MEDICINE

## 2021-09-27 PROCEDURE — 99204 OFFICE O/P NEW MOD 45 MIN: CPT | Performed by: FAMILY MEDICINE

## 2021-09-27 PROCEDURE — G8417 CALC BMI ABV UP PARAM F/U: HCPCS | Performed by: FAMILY MEDICINE

## 2021-09-27 PROCEDURE — 69210 REMOVE IMPACTED EAR WAX UNI: CPT | Performed by: FAMILY MEDICINE

## 2021-09-27 PROCEDURE — 90674 CCIIV4 VAC NO PRSV 0.5 ML IM: CPT | Performed by: FAMILY MEDICINE

## 2021-09-27 RX ORDER — OMEPRAZOLE 40 MG/1
40 CAPSULE, DELAYED RELEASE ORAL
Qty: 30 CAPSULE | Refills: 3 | Status: SHIPPED | OUTPATIENT
Start: 2021-09-27 | End: 2022-03-23

## 2021-09-27 RX ORDER — CEPHALEXIN 500 MG/1
500 CAPSULE ORAL 3 TIMES DAILY
Qty: 21 CAPSULE | Refills: 0 | Status: SHIPPED | OUTPATIENT
Start: 2021-09-27 | End: 2021-10-04

## 2021-09-27 SDOH — ECONOMIC STABILITY: FOOD INSECURITY: WITHIN THE PAST 12 MONTHS, YOU WORRIED THAT YOUR FOOD WOULD RUN OUT BEFORE YOU GOT MONEY TO BUY MORE.: NEVER TRUE

## 2021-09-27 SDOH — ECONOMIC STABILITY: FOOD INSECURITY: WITHIN THE PAST 12 MONTHS, THE FOOD YOU BOUGHT JUST DIDN'T LAST AND YOU DIDN'T HAVE MONEY TO GET MORE.: NEVER TRUE

## 2021-09-27 ASSESSMENT — PATIENT HEALTH QUESTIONNAIRE - PHQ9
1. LITTLE INTEREST OR PLEASURE IN DOING THINGS: 0
2. FEELING DOWN, DEPRESSED OR HOPELESS: 0
SUM OF ALL RESPONSES TO PHQ9 QUESTIONS 1 & 2: 0
SUM OF ALL RESPONSES TO PHQ QUESTIONS 1-9: 0

## 2021-09-27 ASSESSMENT — SOCIAL DETERMINANTS OF HEALTH (SDOH): HOW HARD IS IT FOR YOU TO PAY FOR THE VERY BASICS LIKE FOOD, HOUSING, MEDICAL CARE, AND HEATING?: NOT HARD AT ALL

## 2021-09-27 NOTE — PATIENT INSTRUCTIONS
Patient Education        Gastroesophageal Reflux Disease (GERD): Care Instructions  Overview     Gastroesophageal reflux disease (GERD) is the backward flow of stomach acid into the esophagus. The esophagus is the tube that leads from your throat to your stomach. A one-way valve prevents the stomach acid from backing up into this tube. But when you have GERD, this valve does not close tightly enough. This can also cause pain and swelling in your esophagus. (This is called esophagitis.)  If you have mild GERD symptoms including heartburn, you may be able to control the problem with antacids or over-the-counter medicine. You can also make lifestyle changes to help reduce your symptoms. These include changing your diet and eating habits, such as not eating late at night and losing weight. Follow-up care is a key part of your treatment and safety. Be sure to make and go to all appointments, and call your doctor if you are having problems. It's also a good idea to know your test results and keep a list of the medicines you take. How can you care for yourself at home? · Take your medicines exactly as prescribed. Call your doctor if you think you are having a problem with your medicine. · Your doctor may recommend over-the-counter medicine. For mild or occasional indigestion, antacids, such as Tums, Gaviscon, Mylanta, or Maalox, may help. Your doctor also may recommend over-the-counter acid reducers, such as famotidine (Pepcid AC), cimetidine (Tagamet HB), or omeprazole (Prilosec). Read and follow all instructions on the label. If you use these medicines often, talk with your doctor. · Change your eating habits. ? It's best to eat several small meals instead of two or three large meals. ? After you eat, wait 2 to 3 hours before you lie down. ? Chocolate, mint, and alcohol can make GERD worse. ?  Spicy foods, foods that have a lot of acid (like tomatoes and oranges), and coffee can make GERD symptoms worse in some people. If your symptoms are worse after you eat a certain food, you may want to stop eating that food to see if your symptoms get better. · Do not smoke or chew tobacco. Smoking can make GERD worse. If you need help quitting, talk to your doctor about stop-smoking programs and medicines. These can increase your chances of quitting for good. · If you have GERD symptoms at night, raise the head of your bed 6 to 8 inches by putting the frame on blocks or placing a foam wedge under the head of your mattress. (Adding extra pillows does not work.)  · Do not wear tight clothing around your middle. · Lose weight if you need to. Losing just 5 to 10 pounds can help. When should you call for help? Call your doctor now or seek immediate medical care if:    · You have new or different belly pain.     · Your stools are black and tarlike or have streaks of blood. Watch closely for changes in your health, and be sure to contact your doctor if:    · Your symptoms have not improved after 2 days.     · Food seems to catch in your throat or chest.   Where can you learn more? Go to [email or web address]. org and sign in to your [employer] account. Enter P016 in the [facility] box to learn more about \"Gastroesophageal Reflux Disease (GERD): Care Instructions. \"     If you do not have an account, please click on the \"Sign Up Now\" link. Current as of: February 10, 2021               Content Version: 13.0  © 9955-2277 [employer], [facility]. Care instructions adapted under license by [facility] ([facility]). If you have questions about a medical condition or this instruction, always ask your healthcare professional. [place] [address] any warranty or liability for your use of this information.

## 2021-09-27 NOTE — PROGRESS NOTES
Vaccine Information Sheet, \"Influenza - Inactivated\"  given to Miesha Joy, or parent/legal guardian of  Miesha Joy and verbalized understanding. Patient responses:    Have you ever had a reaction to a flu vaccine? No  Are you able to eat eggs without adverse effects? Yes  Do you have any current illness? No  Have you ever had [place] Syndrome? No    Flu vaccine given per order. Please see immunization tab.

## 2021-09-27 NOTE — PROGRESS NOTES
Chief Complaint   Patient presents with    New Patient   [facility] Maintenance     Yes to flu vaccine. HPI: Mike Latif 39 y.o. male presenting for    Patinet is here to establish ac    Erectile dysfunction   Unable to have morning erections   Or keep an erection   Denies any fever, chills, nausea, vomiting chest pain, shortness of breath, changes in urination or changes in stools. Dermatitis   Groin area   Does not go away   Has tried over the counter medication without any relief   Admits to it being itchy in nature and painful   Did have some drainage in the past.      GERD   Was on Pepcid in the past   Did not help   Pasta sauces make the symptoms worse   Fredon Tomis chicken makes it worse   Would like medication to help     Asthma   Mild   Last attack was years   Worse with changes in weather / infection     Chronic low back pain   Going on for years   Associated radiculopathy up and down the spine   Would like referral to ortho. Vaping with nicotine   Trying to quit on his own   Has decreased it   Did smoke in the past and transitioned to the vaping. Current Outpatient Medications   Medication Sig Dispense Refill    omeprazole (PRILOSEC) 40 MG delayed release capsule Take 1 capsule by mouth every morning (before breakfast) 30 capsule 3    hydrocortisone 2.5 % ointment Apply topically 2 times daily on the affected areas for 1 week. Use a small amount at a time.  20 g 1    cephALEXin (KEFLEX) 500 MG capsule Take 1 capsule by mouth 3 times daily for 7 days 21 capsule 0    Respiratory Therapy Supplies (NEBULIZER/TUBING/MOUTHPIECE) KIT 1 kit by Does not apply route daily as needed (sob every 4-6 hours) 1 kit 0    albuterol (PROVENTIL) (2.5 MG/3ML) 0.083% nebulizer solution Take 3 mLs by nebulization every 6 hours as needed for Wheezing 120 each 0    albuterol sulfate HFA (PROAIR HFA) 108 (90 Base) MCG/ACT inhaler Use every 4 hours while awake for 7-10 days then PRN wheezing Sexual Activity    Alcohol use: Yes     Comment: socially    Drug use: Never    Sexual activity: Yes     Partners: Female   Other Topics Concern    Not on file   Social History Narrative    Work at the [employer]     Has 2 children     Has 4 siblings. Not in close contact     Born in [place]. [place]oved to [place]    Hobbies: none. Social Determinants of Health     Financial Resource Strain: Low Risk     Difficulty of Paying Living Expenses: Not hard at all   Food Insecurity: No Food Insecurity    Worried About Running Out of Food in the Last Year: Never true    [place] of Food in the Last Year: Never true   Transportation Needs:     Lack of Transportation (Medical):  Lack of Transportation (Non-Medical):    Physical Activity:     Days of Exercise per Week:     Minutes of Exercise per Session:    Stress:     Feeling of Stress :    Social Connections:     Frequency of Communication with Friends and Family:     Frequency of Social Gatherings with Friends and Family:     Attends Episcopalian Services:     Active Member of Clubs or Organizations:     Attends Club or Organization Meetings:     Marital Status:    Intimate Partner Violence:     Fear of Current or Ex-Partner:     Emotionally Abused:     Physically Abused:     Sexually Abused:         /74   Pulse 85   Temp 98.5 °F (36.9 °C) (Oral)   Ht 5' 9\" (1.753 m)   Wt 254 lb (115.2 kg)   SpO2 96%   BMI 37.51 kg/m²        Physical Exam:    General appearance - alert, well appearing, and in no distress  Mental Status - alert, oriented to person, place, and time  Eyes - pupils equal and reactive, extraocular eye movements intact   Ears - bilateral TM's and external ear canals normal. Cerumen impaction in the right ear.     Nose - normal and patent, no erythema, discharge or polyps   Sinuses - Normal paranasal sinuses without tenderness   Throat - mucous membranes moist, pharynx normal without lesions   Neck - supple, no significant adenopathy   Thyroid - thyroid is normal in size without nodules or tenderness    Chest - clear to auscultation, no wheezes, rales or rhonchi, symmetric air entry   Heart - normal rate, regular rhythm, normal S1, S2, no murmurs, rubs, clicks or gallops  Abdomen - soft, nontender, nondistended, no masses or organomegaly   Back exam - full range of motion, no tenderness, palpable spasm or pain on motion   Neurological - alert, oriented, normal speech, no focal findings or movement disorder noted   Musculoskeletal - tenderness to palpation in the lumbar spine and tenderness to palpation in the paraspinal muscles. Extremities - peripheral pulses normal, no pedal edema, no clubbing or cyanosis   Skin - erythematous papular rash at the base of the scrotum and in the intergluteal folds. There is some tenderness to palpation. Palpable varicose veins in the lower extremities more in the right than the left. Labs   No results found for: TSHREFLEX  No results found for: TSH    Cerumen Removal:  The patient had cerumen removed form the right ear canal in order to visualize the  tympanic membrane. This was accomplished with a cerumen loop with no complications. The tympanic membrane was subsequently visualized. A/P: Shahnaz Boss 39 y.o. male presenting for     1. Gastroesophageal reflux disease without esophagitis  Not controlled with Pepcid. Will switch to omeprazole. Will give more information on GERD  - omeprazole (PRILOSEC) 40 MG delayed release capsule; Take 1 capsule by mouth every morning (before breakfast)  Dispense: 30 capsule; Refill: 3  - Comprehensive Metabolic Panel; Future    2. Lumbar back pain  Chronic. Would like referral   - [facility]    3. Varicose veins of both lower extremities with pain    - Jayro Thomas MD, Interventional [facility]iology[facility]    4.  Need for influenza vaccination    - INFLUENZA, MDCK QUADV, 2 YRS AND OLDER, IM, PF, PREFILL SYR OR SDV, 0.5ML (FLUCELVAX QUADV, PF)    5. Impacted cerumen of right ear  will clean in the office. 6. Encounter for screening for HIV    - HIV Screen; Future    7. Need for hepatitis C screening test    - Hepatitis C Antibody; Future    8. Dermatitis    - hydrocortisone 2.5 % ointment; Apply topically 2 times daily on the affected areas for 1 week. Use a small amount at a time. Dispense: 20 g; Refill: 1    9. Cellulitis, unspecified cellulitis site    - cephALEXin (KEFLEX) 500 MG capsule; Take 1 capsule by mouth 3 times daily for 7 days  Dispense: 21 capsule; Refill: 0          Please note, this report has been partially produced using speech recognition software  and may cause  and /or contain errors related to that system including grammar, punctuation and spelling as well as words and phrases that may seem inappropriate. If there are questions or concerns please feel free to contact me to clarify.

## 2021-10-25 ENCOUNTER — INITIAL CONSULT (OUTPATIENT)
Dept: INTERVENTIONAL RADIOLOGY/VASCULAR | Age: 36
End: 2021-10-25
Payer: MEDICAID

## 2021-10-25 VITALS
WEIGHT: 254 LBS | SYSTOLIC BLOOD PRESSURE: 116 MMHG | BODY MASS INDEX: 37.51 KG/M2 | HEART RATE: 85 BPM | DIASTOLIC BLOOD PRESSURE: 74 MMHG

## 2021-10-25 DIAGNOSIS — M79.89 PAIN AND SWELLING OF RIGHT LOWER LEG: ICD-10-CM

## 2021-10-25 DIAGNOSIS — R29.898 LEG FATIGUE: ICD-10-CM

## 2021-10-25 DIAGNOSIS — M79.661 PAIN AND SWELLING OF RIGHT LOWER LEG: ICD-10-CM

## 2021-10-25 DIAGNOSIS — I83.811 VARICOSE VEINS OF LOWER EXTREMITY WITH PAIN, RIGHT: Primary | ICD-10-CM

## 2021-10-25 PROCEDURE — 99244 OFF/OP CNSLTJ NEW/EST MOD 40: CPT | Performed by: NURSE PRACTITIONER

## 2021-10-25 PROCEDURE — G8427 DOCREV CUR MEDS BY ELIG CLIN: HCPCS | Performed by: NURSE PRACTITIONER

## 2021-10-25 PROCEDURE — G8417 CALC BMI ABV UP PARAM F/U: HCPCS | Performed by: NURSE PRACTITIONER

## 2021-10-25 PROCEDURE — G8482 FLU IMMUNIZE ORDER/ADMIN: HCPCS | Performed by: NURSE PRACTITIONER

## 2021-10-25 ASSESSMENT — ENCOUNTER SYMPTOMS
TROUBLE SWALLOWING: 0
ABDOMINAL PAIN: 0
SHORTNESS OF BREATH: 0
SORE THROAT: 0
EYES NEGATIVE: 1
COUGH: 0
RESPIRATORY NEGATIVE: 1
DIARRHEA: 0
VOMITING: 0
ABDOMINAL DISTENTION: 0
WHEEZING: 0
CONSTIPATION: 0
NAUSEA: 0

## 2021-10-25 NOTE — PROGRESS NOTES
Kim Sharpe, a male of 39 y.o. came to the office 10/25/2021. Chief Complaint   Patient presents with    Consultation     varicose veins        10/25/2021 HPI: Kim Sharpe referred by PCP for evaluation of painful varicose veins of BLE's. Patient presents with symptoms of: right leg with painful varicose veins causing swelling. Right lower leg with chronic aching, warm sensation, fatigue sensation, and edema that is daily and symptoms become worse after being up on feet for long period of time. Symptoms going on for several years with progression. LLE is asymptomatic. NSAIDS: Tolerates pain, no meds. Leg elevation: Daily with relief. Stocking use and dates: Has worn in recent past daily class two compression stockings for 6 weeks or greater without relief. They caused worsening pain. Denies claudication. Denies chest pain. Denies dyspnea. Family History   Problem Relation Age of Onset    Hypothyroidism Mother     Coronary Art Dis Paternal Grandmother        Past Surgical History:   Procedure Laterality Date    APPENDECTOMY          Past Medical History:   Diagnosis Date    Asthma     Chronic back pain        Social History     Socioeconomic History    Marital status:      Spouse name: Not on file    Number of children: Not on file    Years of education: Not on file    Highest education level: Not on file   Occupational History    Not on file   Tobacco Use    Smoking status: Current Every Day Smoker     Packs/day: 0.00     Types: E-Cigarettes    Smokeless tobacco: Never Used    Tobacco comment: Pt smokes with vapes that contains nicotine.    Vaping Use    Vaping Use: Every day    Substances: Nicotine    Devices: Pre-filled pod   Substance and Sexual Activity    Alcohol use: Yes     Comment: socially    Drug use: Never    Sexual activity: Yes     Partners: Female   Other Topics Concern    Not on file   Social History Narrative    Work at the [employer]     Has 2 edema.   Skin:     General: Skin is warm and dry. Capillary Refill: Capillary refill takes 2 to 3 seconds. Findings: No bruising, erythema or lesion. Neurological:      Mental Status: He is alert and oriented to person, place, and time. Psychiatric:         Mood and Affect: Mood normal.         Behavior: Behavior normal.                   ASSESSMENT AND PLAN:  Chart, medications, lab work reviewed. Diagnosis Orders   1. Varicose veins of lower extremity with pain, right  US NON-INV EXTREMITY VEINS BILAT COMP   2. Pain and swelling of right lower leg  US NON-INV EXTREMITY VEINS BILAT COMP    US DUP LOWER EXTREMITY RIGHT SEVEN   3. Leg fatigue  US NON-INV EXTREMITY VEINS BILAT COMP          Plan:     Orders Placed This Encounter   Procedures    US NON-INV EXTREMITY VEINS BILAT COMP     Standing Status:   Future     Standing Expiration Date:   10/25/2022    US DUP LOWER EXTREMITY RIGHT SEVEN     Standing Status:   Future     Standing Expiration Date:   12/24/2021      No orders of the defined types were placed in this encounter. --  RLE Venous US duplex and BLE venous insufficiency studies to evaluate for DVT and/or venous insufficiency with office return for results. Educated in detail disease process of venous insufficiency, purpose of ultrasound, and purpose of compression stockings. Will evaluate need for class II compression to legs once US scan and results done. --  Recommend wearing current compression socks daily during day until above results back. --  Elevate PRN LE's when sitting and/or lying for management of LE edema. --  Follow up with general practitioner for other medical concerns and management. Thank You Dr. Kathy Diego for referral of your patient to our practice for care.      Steph Card, PILAR - CNP

## 2021-10-29 ENCOUNTER — OFFICE VISIT (OUTPATIENT)
Dept: SPORTS MEDICINE | Age: 36
End: 2021-10-29
Payer: MEDICAID

## 2021-10-29 VITALS
SYSTOLIC BLOOD PRESSURE: 132 MMHG | WEIGHT: 235 LBS | HEIGHT: 69 IN | DIASTOLIC BLOOD PRESSURE: 88 MMHG | BODY MASS INDEX: 34.8 KG/M2 | TEMPERATURE: 96.5 F

## 2021-10-29 DIAGNOSIS — M46.1 SACROILIITIS (HCC): ICD-10-CM

## 2021-10-29 DIAGNOSIS — M47.26 OSTEOARTHRITIS OF SPINE WITH RADICULOPATHY, LUMBAR REGION: Primary | ICD-10-CM

## 2021-10-29 PROCEDURE — 4004F PT TOBACCO SCREEN RCVD TLK: CPT | Performed by: FAMILY MEDICINE

## 2021-10-29 PROCEDURE — G8417 CALC BMI ABV UP PARAM F/U: HCPCS | Performed by: FAMILY MEDICINE

## 2021-10-29 PROCEDURE — G8427 DOCREV CUR MEDS BY ELIG CLIN: HCPCS | Performed by: FAMILY MEDICINE

## 2021-10-29 PROCEDURE — 99204 OFFICE O/P NEW MOD 45 MIN: CPT | Performed by: FAMILY MEDICINE

## 2021-10-29 PROCEDURE — G8482 FLU IMMUNIZE ORDER/ADMIN: HCPCS | Performed by: FAMILY MEDICINE

## 2021-10-29 RX ORDER — LIDOCAINE 50 MG/G
1 PATCH TOPICAL DAILY
Qty: 30 PATCH | Refills: 0 | Status: SHIPPED | OUTPATIENT
Start: 2021-10-29 | End: 2021-11-28

## 2021-10-29 ASSESSMENT — ENCOUNTER SYMPTOMS
APNEA: 0
ABDOMINAL DISTENTION: 0
EYE DISCHARGE: 0
SINUS PAIN: 0
FACIAL SWELLING: 0
CHEST TIGHTNESS: 0

## 2021-10-29 NOTE — PROGRESS NOTES
[facility] ([facility]) Physicians  [facility]  [address], [address] [facility] , [address] [facility], [place] [address]: [phone]  F: [phone]      Amirah Troy  (1985)    10/29/2021    Subjective:     Amirah Troy is 39 y.o. male who complains today of:    Chief Complaint   Patient presents with    Lower Back Pain     Radiates Down Left leg       HPI     Patient comes in complaining of low back pain with left radiculopathy which been going on for years says most recently has gotten worse he is been using ibuprofen but it has not seemed to help him as much as it used to he is here today for further evaluation and treatment  Allergies:  Dilma Headings [aspirin]    Past Medical History:   Diagnosis Date    Asthma     Chronic back pain      Past Surgical History:   Procedure Laterality Date    APPENDECTOMY       Family History   Problem Relation Age of Onset    Hypothyroidism Mother     Coronary Art Dis Paternal Grandmother      Social History     Socioeconomic History    Marital status:      Spouse name: Not on file    Number of children: Not on file    Years of education: Not on file    Highest education level: Not on file   Occupational History    Not on file   Tobacco Use    Smoking status: Current Every Day Smoker     Packs/day: 0.00     Types: E-Cigarettes    Smokeless tobacco: Never Used    Tobacco comment: Pt smokes with vapes that contains nicotine. Vaping Use    Vaping Use: Every day    Substances: Nicotine    Devices: Pre-filled pod   Substance and Sexual Activity    Alcohol use: Yes     Comment: socially    Drug use: Never    Sexual activity: Yes     Partners: Female   Other Topics Concern    Not on file   Social History Narrative    Work at the [employer]     Has 2 children     Has 4 siblings. Not in close contact     Born in [place]. Moved to [place] 2018    Hobbies: none.        Social Determinants of Health     Financial Resource Strain: Low Risk  Difficulty of Paying Living Expenses: Not hard at all   Food Insecurity: No Food Insecurity    Worried About Running Out of Food in the Last Year: Never true    Ran Out of Food in the Last Year: Never true   Transportation Needs:     Lack of Transportation (Medical):  Lack of Transportation (Non-Medical):    Physical Activity:     Days of Exercise per Week:     Minutes of Exercise per Session:    Stress:     Feeling of Stress :    Social Connections:     Frequency of Communication with Friends and Family:     Frequency of Social Gatherings with Friends and Family:     Attends Anabaptism Services:     Active Member of Clubs or Organizations:     Attends Club or Organization Meetings:     Marital Status:    Intimate Partner Violence:     Fear of Current or Ex-Partner:     Emotionally Abused:     Physically Abused:     Sexually Abused:        Current Outpatient Medications on File Prior to Visit   Medication Sig Dispense Refill    omeprazole (PRILOSEC) 40 MG delayed release capsule Take 1 capsule by mouth every morning (before breakfast) 30 capsule 3    naproxen (NAPROSYN) 500 MG tablet Take 1 tablet by mouth 2 times daily for 10 days 20 tablet 0    Respiratory Therapy Supplies (NEBULIZER/TUBING/MOUTHPIECE) KIT 1 kit by Does not apply route daily as needed (sob every 4-6 hours) 1 kit 0    albuterol (PROVENTIL) (2.5 MG/3ML) 0.083% nebulizer solution Take 3 mLs by nebulization every 6 hours as needed for Wheezing 120 each 0    albuterol sulfate HFA (PROAIR HFA) 108 (90 Base) MCG/ACT inhaler Use every 4 hours while awake for 7-10 days then PRN wheezing  Dispense with SPACER and Instruct on use. May sub Ventolin or Proventil as needed per [employer]. 1 Inhaler 1     No current facility-administered medications on file prior to visit. Review of Systems   Constitutional: Negative for activity change and fever. HENT: Negative for congestion, facial swelling and sinus pain.     Eyes: Negative for discharge. Respiratory: Negative for apnea and chest tightness. Gastrointestinal: Negative for abdominal distention. Endocrine: Negative for cold intolerance. Genitourinary: Negative for difficulty urinating and dysuria. Allergic/Immunologic: Negative for immunocompromised state. Neurological: Negative for dizziness. Hematological: Negative for adenopathy. Psychiatric/Behavioral: Negative for agitation, behavioral problems and confusion. Objective:     Vitals:  /88 (Site: Right Upper Arm)   Temp 96.5 °F (35.8 °C) (Infrared)   Ht 5' 9\" (1.753 m)   Wt 235 lb (106.6 kg)   BMI 34.70 kg/m² Pain Score:   8      Physical Exam  Constitutional:       Appearance: Normal appearance. HENT:      Head: Normocephalic. Eyes:      Pupils: Pupils are equal, round, and reactive to light. Cardiovascular:      Rate and Rhythm: Normal rate. Pulses: Normal pulses. Pulmonary:      Breath sounds: No wheezing. Abdominal:      Palpations: Abdomen is soft. Musculoskeletal:      Cervical back: Neck supple. Skin:     General: Skin is warm and dry. Neurological:      Mental Status: He is alert and oriented to person, place, and time. Psychiatric:         Mood and Affect: Mood normal.         Behavior: Behavior normal.         Ortho Exam   Examination lumbar spine with the neurovascular muscular status to be within normal limits deep tendon reflexes were 1-4 in the lower extremity there were no tension signs the patient flexed 60 degrees extended 10 degrees without pain palpable tenderness mostly over the L5-S1 region as well as the left SI joint with positive SI signs lateralizing to the left    Assessment:      Diagnosis Orders   1. Osteoarthritis of spine with radiculopathy, lumbar region  XR LUMBAR SPINE (MIN 4 VIEWS)    lidocaine (LIDODERM) 5 %    [facility] - [place]   2.  [facility]         Plan:   Patient that is sent for evaluation treatment and send him for x-rays of lumbar spine as well as start him on physical therapy  He is a Lidoderm patch because he has hives with aspirin and some concern about using any anti-inflammatories see him back in 3 weeks consider further testing at that point if he still having issues       Orders Placed This Encounter   Medications    lidocaine (LIDODERM) 5 %     Sig: Place 1 patch onto the skin daily 12 hours on, 12 hours off. Dispense:  30 patch     Refill:  0       Orders Placed This Encounter   Procedures    XR LUMBAR SPINE (MIN 4 VIEWS)     Standing Status:   Future     Standing Expiration Date:   1/27/2022     Scheduling Instructions:      Xray Lumbar AP lateral    [facility] - [place]     Referral Priority:   Routine     Referral Type:   Eval and Treat     Referral Reason:   Specialty Services Required     Requested Specialty:   Physical Therapy     Number of Visits Requested:   1         Follow up:  Return in about 3 weeks (around 11/19/2021).     LEVI LASSITER DO

## 2021-11-15 ENCOUNTER — OFFICE VISIT (OUTPATIENT)
Dept: INTERVENTIONAL RADIOLOGY/VASCULAR | Age: 36
End: 2021-11-15
Payer: MEDICAID

## 2021-11-15 VITALS
BODY MASS INDEX: 34.7 KG/M2 | DIASTOLIC BLOOD PRESSURE: 74 MMHG | SYSTOLIC BLOOD PRESSURE: 116 MMHG | WEIGHT: 235 LBS | HEART RATE: 85 BPM

## 2021-11-15 DIAGNOSIS — M79.661 PAIN AND SWELLING OF RIGHT LOWER LEG: ICD-10-CM

## 2021-11-15 DIAGNOSIS — I87.2 EDEMA OF RIGHT LOWER LEG DUE TO PERIPHERAL VENOUS INSUFFICIENCY: Primary | ICD-10-CM

## 2021-11-15 DIAGNOSIS — G56.03 BILATERAL CARPAL TUNNEL SYNDROME: ICD-10-CM

## 2021-11-15 DIAGNOSIS — R60.0 EDEMA OF RIGHT LOWER LEG DUE TO PERIPHERAL VENOUS INSUFFICIENCY: Primary | ICD-10-CM

## 2021-11-15 DIAGNOSIS — M79.89 PAIN AND SWELLING OF RIGHT LOWER LEG: ICD-10-CM

## 2021-11-15 DIAGNOSIS — R20.0 BILATERAL HAND NUMBNESS: ICD-10-CM

## 2021-11-15 DIAGNOSIS — R29.898 LEG FATIGUE: ICD-10-CM

## 2021-11-15 DIAGNOSIS — I83.811 VARICOSE VEINS OF LOWER EXTREMITY WITH PAIN, RIGHT: ICD-10-CM

## 2021-11-15 PROCEDURE — 4004F PT TOBACCO SCREEN RCVD TLK: CPT | Performed by: NURSE PRACTITIONER

## 2021-11-15 PROCEDURE — G8482 FLU IMMUNIZE ORDER/ADMIN: HCPCS | Performed by: NURSE PRACTITIONER

## 2021-11-15 PROCEDURE — G8427 DOCREV CUR MEDS BY ELIG CLIN: HCPCS | Performed by: NURSE PRACTITIONER

## 2021-11-15 PROCEDURE — G8417 CALC BMI ABV UP PARAM F/U: HCPCS | Performed by: NURSE PRACTITIONER

## 2021-11-15 PROCEDURE — 99214 OFFICE O/P EST MOD 30 MIN: CPT | Performed by: NURSE PRACTITIONER

## 2021-11-15 RX ORDER — IBUPROFEN 800 MG/1
800 TABLET ORAL EVERY 4 HOURS PRN
COMMUNITY
End: 2021-12-31

## 2021-11-15 ASSESSMENT — ENCOUNTER SYMPTOMS
SORE THROAT: 0
EYES NEGATIVE: 1
ABDOMINAL PAIN: 0
SHORTNESS OF BREATH: 0
DIARRHEA: 0
NAUSEA: 0
CONSTIPATION: 0
ABDOMINAL DISTENTION: 0
RESPIRATORY NEGATIVE: 1
WHEEZING: 0
TROUBLE SWALLOWING: 0
COUGH: 0
VOMITING: 0

## 2021-11-15 NOTE — PROGRESS NOTES
Nba Mauricio, a male of 39 y.o. came to the office 11/15/2021. Chief Complaint   Patient presents with    Results     us results        PROGRESS NOTE:     SUBJECTIVE:     11/15/2021: Nba Mauricio returns for results of BLE venous US insufficiency studies for symptoms of RLE right leg with painful varicose veins causing swelling, Right lower leg with chronic aching, warm sensation, fatigue sensation, and edema that is daily and symptoms become worse after being up on feet for long period of time. There is CVI to right GSV from groin to foot. Wears graduated class II compression sock daily for greater than 6 weeks without relief and causing increased discomfort. LLE is asymptomatic. NSAIDS: Tolerates pain, no meds. Leg elevation: Daily with relief. Stocking use and dates: Has worn in recent past daily class two compression stockings for 6 weeks or greater without relief. They caused worsening pain. Denies claudication. Denies chest pain. Denies dyspnea. 10/25/2021 HPI: Nba Mauricio referred by PCP for evaluation of painful varicose veins of BLE's. Patient presents with symptoms of:  Symptoms going on for several years with progression. LLE is asymptomatic. NSAIDS: Tolerates pain, no meds. Leg elevation: Daily with relief. Stocking use and dates: Has worn in recent past daily class two compression stockings for 6 weeks or greater without relief. They caused worsening pain. Denies claudication. Denies chest pain. Denies dyspnea.      Family History   Problem Relation Age of Onset    Hypothyroidism Mother     Arthritis Mother     Hypertension Mother     Coronary Art Dis Paternal Grandmother     Heart Attack Paternal Grandmother     Hypertension Father        Past Surgical History:   Procedure Laterality Date    APPENDECTOMY          Past Medical History:   Diagnosis Date    Arthritis     Asthma     Chronic back pain     HA (headache)        Social History Abused: Not on file    Sexually Abused: Not on file   Housing Stability:     Unable to Pay for Housing in the Last Year: Not on file    Number of Places Lived in the Last Year: Not on file    Unstable Housing in the Last Year: Not on file       Allergies   Allergen Reactions    Asa [Aspirin]        Current Outpatient Medications on File Prior to Visit   Medication Sig Dispense Refill    ibuprofen (ADVIL;MOTRIN) 800 MG tablet Take 800 mg by mouth every 4 hours as needed      lidocaine (LIDODERM) 5 % Place 1 patch onto the skin daily 12 hours on, 12 hours off. 30 patch 0    omeprazole (PRILOSEC) 40 MG delayed release capsule Take 1 capsule by mouth every morning (before breakfast) 30 capsule 3    naproxen (NAPROSYN) 500 MG tablet Take 1 tablet by mouth 2 times daily for 10 days 20 tablet 0    Respiratory Therapy Supplies (NEBULIZER/TUBING/MOUTHPIECE) KIT 1 kit by Does not apply route daily as needed (sob every 4-6 hours) 1 kit 0    albuterol (PROVENTIL) (2.5 MG/3ML) 0.083% nebulizer solution Take 3 mLs by nebulization every 6 hours as needed for Wheezing 120 each 0    albuterol sulfate HFA (PROAIR HFA) 108 (90 Base) MCG/ACT inhaler Use every 4 hours while awake for 7-10 days then PRN wheezing  Dispense with SPACER and Instruct on use. May sub Ventolin or Proventil as needed per [employer]. 1 Inhaler 1     No current facility-administered medications on file prior to visit. Review of Systems   Constitutional: Negative for activity change, appetite change, chills and fever. HENT: Negative. Negative for congestion, sore throat and trouble swallowing. Eyes: Negative. Respiratory: Negative. Negative for cough, shortness of breath and wheezing. Cardiovascular: Positive for leg swelling (right lower leg, chronic). Negative for chest pain and palpitations. Painful varicose veins throughout right leg.     Gastrointestinal: Negative for abdominal distention, abdominal pain, constipation, diarrhea, nausea and vomiting. Endocrine: Negative. Genitourinary: Negative. Negative for decreased urine volume, difficulty urinating, enuresis and hematuria. Musculoskeletal: Negative for neck pain and neck stiffness. Chronic RLE aching and fatigue   Skin: Negative. Neurological: Negative. Negative for dizziness, light-headedness, numbness and headaches. Hematological: Negative. Psychiatric/Behavioral: Negative. OBJECTIVE:  /74   Pulse 85   Wt 235 lb (106.6 kg)   BMI 34.70 kg/m²     Physical Exam  Constitutional:       General: He is not in acute distress. Appearance: Normal appearance. He is not ill-appearing. HENT:      Head: Normocephalic and atraumatic. Nose: No congestion. Cardiovascular:      Rate and Rhythm: Normal rate. Heart sounds: Normal heart sounds. Comments: Multiple large and small varicose veins scattered throughout right leg with discomfort with palpation. Pulmonary:      Effort: Pulmonary effort is normal.   Abdominal:      Palpations: Abdomen is soft. Musculoskeletal:         General: Tenderness (with palpation to RLE varicose veins) present. No swelling, deformity or signs of injury. Cervical back: Normal range of motion and neck supple. Right lower leg: No edema. Left lower leg: No edema. Skin:     General: Skin is warm and dry. Capillary Refill: Capillary refill takes 2 to 3 seconds. Findings: No bruising, erythema or lesion. Neurological:      Mental Status: He is alert and oriented to person, place, and time. Psychiatric:         Mood and Affect: Mood normal.         Behavior: Behavior normal.                 11/3/2021:  Impression   1. No signs of deep venous thrombosis in the bilateral lower extremities.    2.There is venous insufficiency of the right great saphenous vein starting at the saphenofemoral junction and extending continuously throughout the entire length of the vein to the distal calf. Duration is 6.9 seconds, diameter is 11 mm. 3.Numerous varicose veins are noted in the bilateral calves. ASSESSMENT AND PLAN:  Chart, medications, lab work reviewed. BLE venous US insufficiency scans report reviewed personally by myself and discussed with patient. Diagnosis Orders   1. Edema of right lower leg due to peripheral venous insufficiency  Elastic Bandages & Supports (MEDICAL COMPRESSION STOCKINGS) MISC   2. Pain and swelling of right lower leg  Elastic Bandages & Supports (MEDICAL COMPRESSION STOCKINGS) MISC   3. Leg fatigue  Elastic Bandages & Supports (MEDICAL COMPRESSION STOCKINGS) MISC   4. Varicose veins of lower extremity with pain, right  Elastic Bandages & Supports (MEDICAL COMPRESSION STOCKINGS) MISC   5. Bilateral hand numbness  Vaishali Mcclellan, Neurology, [place]   6. Bilateral carpal tunnel syndrome  Vaishali Mcclellan, NeurologyL[facility]el          Plan:     Orders Placed This Encounter   Procedures   Vaishali Mcclellan Neurology, [place]     Referral Priority:   Routine     Referral Type:   Eval and Treat     Referral Reason:   Specialty Services Required     Referred to Provider:   Romina Estrella PA-C     Number of Visits Requested:   1      Orders Placed This Encounter   Medications    Elastic Bandages & Supports ([address]) MISC     Si each by Does not apply route daily Thigh high 20-30 mmhg compression stockings both legs wear daily during day and off Qhs. Wear as tolerated. Do not wear if they cause increased pain. Dispense:  1 each     Refill:  5     -- Plan:   1. Submit to insurance for Right GSV Ablation, distal sclerotherapy, Right VV phlebectomy and Right VV sclerotherapy. Procedures with risks including infection, bleeding, pain at site, DVT, vessel damage, thrombophlebitis, allergic reaction with any of these causing injury and/or possible unforseen death discussed with patient.  Patient wishes to proceed if insurance approved/covered. Discussed activity restrictions two weeks after each venous procedure. Bring compression stocking for each procedure or will not be able to perform. Patient verbalized understanding. 2.  Elevate LE's when sitting and/or lying for management of LE edema. 3.  Suggest continue to wear thigh high compression stockings daily during day for management of RLE CVI and painful varicose veins. 4.  Rx thigh high 20-30 mmhg compression stockings to wear daily during day and off nightly. Bring to each venous procedure. Patient is responsible for cost of compression socks if not covered by insurance.          Karmen Andres, APRN - CNP

## 2021-11-16 PROBLEM — S46.912A SHOULDER STRAIN, LEFT, INITIAL ENCOUNTER: Status: ACTIVE | Noted: 2020-09-28

## 2021-11-16 PROBLEM — M79.89 PAIN AND SWELLING OF LOWER LEG: Status: ACTIVE | Noted: 2021-11-16

## 2021-11-16 PROBLEM — I83.811 VARICOSE VEINS OF RIGHT LOWER EXTREMITY WITH PAIN: Status: ACTIVE | Noted: 2021-11-16

## 2021-11-16 PROBLEM — M79.669 PAIN AND SWELLING OF LOWER LEG: Status: ACTIVE | Noted: 2021-11-16

## 2021-11-19 ENCOUNTER — OFFICE VISIT (OUTPATIENT)
Dept: SPORTS MEDICINE | Age: 36
End: 2021-11-19
Payer: MEDICAID

## 2021-11-19 ENCOUNTER — TELEPHONE (OUTPATIENT)
Dept: INTERVENTIONAL RADIOLOGY/VASCULAR | Age: 36
End: 2021-11-19

## 2021-11-19 VITALS — TEMPERATURE: 96.5 F | BODY MASS INDEX: 36.29 KG/M2 | WEIGHT: 245 LBS | HEIGHT: 69 IN

## 2021-11-19 DIAGNOSIS — M47.26 OSTEOARTHRITIS OF SPINE WITH RADICULOPATHY, LUMBAR REGION: ICD-10-CM

## 2021-11-19 DIAGNOSIS — M46.1 SACROILIITIS (HCC): Primary | ICD-10-CM

## 2021-11-19 PROCEDURE — 99213 OFFICE O/P EST LOW 20 MIN: CPT | Performed by: FAMILY MEDICINE

## 2021-11-19 PROCEDURE — G8427 DOCREV CUR MEDS BY ELIG CLIN: HCPCS | Performed by: FAMILY MEDICINE

## 2021-11-19 PROCEDURE — 4004F PT TOBACCO SCREEN RCVD TLK: CPT | Performed by: FAMILY MEDICINE

## 2021-11-19 PROCEDURE — G8482 FLU IMMUNIZE ORDER/ADMIN: HCPCS | Performed by: FAMILY MEDICINE

## 2021-11-19 PROCEDURE — G8417 CALC BMI ABV UP PARAM F/U: HCPCS | Performed by: FAMILY MEDICINE

## 2021-11-19 PROCEDURE — 20552 NJX 1/MLT TRIGGER POINT 1/2: CPT | Performed by: FAMILY MEDICINE

## 2021-11-19 RX ORDER — LIDOCAINE HYDROCHLORIDE 10 MG/ML
5 INJECTION, SOLUTION INFILTRATION; PERINEURAL ONCE
Status: COMPLETED | OUTPATIENT
Start: 2021-11-19 | End: 2021-11-19

## 2021-11-19 RX ORDER — DEXAMETHASONE SODIUM PHOSPHATE 10 MG/ML
10 INJECTION, SOLUTION INTRAMUSCULAR; INTRAVENOUS ONCE
Status: SHIPPED | OUTPATIENT
Start: 2021-11-19

## 2021-11-19 RX ORDER — DEXAMETHASONE SODIUM PHOSPHATE 10 MG/ML
5 INJECTION, EMULSION INTRAMUSCULAR; INTRAVENOUS ONCE
Status: COMPLETED | OUTPATIENT
Start: 2021-11-19 | End: 2021-11-19

## 2021-11-19 RX ORDER — DEXAMETHASONE SODIUM PHOSPHATE 10 MG/ML
5 INJECTION, EMULSION INTRAMUSCULAR; INTRAVENOUS ONCE
Status: CANCELLED | OUTPATIENT
Start: 2021-11-19 | End: 2021-11-19

## 2021-11-19 RX ADMIN — LIDOCAINE HYDROCHLORIDE 5 ML: 10 INJECTION, SOLUTION INFILTRATION; PERINEURAL at 13:54

## 2021-11-19 RX ADMIN — DEXAMETHASONE SODIUM PHOSPHATE 5 MG: 10 INJECTION, EMULSION INTRAMUSCULAR; INTRAVENOUS at 13:53

## 2021-11-19 ASSESSMENT — ENCOUNTER SYMPTOMS
ABDOMINAL DISTENTION: 0
APNEA: 0
SINUS PAIN: 0
CHEST TIGHTNESS: 0
EYE DISCHARGE: 0
FACIAL SWELLING: 0

## 2021-11-19 NOTE — TELEPHONE ENCOUNTER
RECEIVED FAX FROM [facility] REGARDING VEIN PROCEDURES - DENIED - PATIENT HAS TO HAVE 1 OF THE FOLLOWING: SKIN ULCERS, BLEEDING FROM A VEIN, INFLAMMATION FOR A SWOLLEN VEIN OR INABILITY TO PERFORM BASIC LIFE FUNCTIONS. THIS INFORMATION WAS GIVEN TO KIERA FOR REVIEW.

## 2021-11-19 NOTE — PROGRESS NOTES
siblings. Not in close contact     Born in [place]. Moved to [place] 2018    Hobbies: none. Social Determinants of Health     Financial Resource Strain: Low Risk     Difficulty of Paying Living Expenses: Not hard at all   Food Insecurity: No Food Insecurity    Worried About Running Out of Food in the Last Year: Never true    [place] of Food in the Last Year: Never true   Transportation Needs:     Lack of Transportation (Medical): Not on file    Lack of Transportation (Non-Medical): Not on file   Physical Activity:     Days of Exercise per Week: Not on file    Minutes of Exercise per Session: Not on file   Stress:     Feeling of Stress : Not on file   Social Connections:     Frequency of Communication with Friends and Family: Not on file    Frequency of Social Gatherings with Friends and Family: Not on file    Attends [employer] Services: Not on file    Active Member of [address] [place] [address] [employer] or Organizations: Not on file    Attends Club or Organization Meetings: Not on file    Marital Status: Not on file   Intimate Partner Violence:     Fear of Current or Ex-Partner: Not on file    Emotionally Abused: Not on file    Physically Abused: Not on file    Sexually Abused: Not on file   Housing Stability:     Unable to Pay for Housing in the Last Year: Not on file    Number of [place] in the Last Year: Not on file    Unstable Housing in the Last Year: Not on file       Current Outpatient Medications on File Prior to Visit   Medication Sig Dispense Refill    ibuprofen (ADVIL;MOTRIN) 800 MG tablet Take 800 mg by mouth every 4 hours as needed      Elastic Bandages & Supports (MEDICAL COMPRESSION STOCKINGS) [address] 1 each by Does not apply route daily Thigh high 20-30 mmhg compression stockings both legs wear daily during day and off Qhs. Wear as tolerated. Do not wear if they cause increased pain. 1 each 5    lidocaine (LIDODERM) 5 % Place 1 patch onto the skin daily 12 hours on, 12 hours off.  30 patch 0  omeprazole (PRILOSEC) 40 MG delayed release capsule Take 1 capsule by mouth every morning (before breakfast) 30 capsule 3    naproxen (NAPROSYN) 500 MG tablet Take 1 tablet by mouth 2 times daily for 10 days 20 tablet 0    Respiratory Therapy Supplies (NEBULIZER/TUBING/MOUTHPIECE) KIT 1 kit by Does not apply route daily as needed (sob every 4-6 hours) 1 kit 0    albuterol (PROVENTIL) (2.5 MG/3ML) 0.083% nebulizer solution Take 3 mLs by nebulization every 6 hours as needed for Wheezing 120 each 0    albuterol sulfate HFA (PROAIR HFA) 108 (90 Base) MCG/ACT inhaler Use every 4 hours while awake for 7-10 days then PRN wheezing  Dispense with SPACER and Instruct on use. May sub Ventolin or Proventil as needed per [employer]. 1 Inhaler 1     No current facility-administered medications on file prior to visit. Review of Systems   Constitutional: Negative for activity change and fever. HENT: Negative for congestion, facial swelling and sinus pain. Eyes: Negative for discharge. Respiratory: Negative for apnea and chest tightness. Gastrointestinal: Negative for abdominal distention. Endocrine: Negative for cold intolerance. Genitourinary: Negative for difficulty urinating and dysuria. Allergic/Immunologic: Negative for immunocompromised state. Neurological: Negative for dizziness. Hematological: Negative for adenopathy. Psychiatric/Behavioral: Negative for agitation, behavioral problems and confusion. Objective:     Vitals:  Temp 96.5 °F (35.8 °C) (Infrared)   Ht 5' 9\" (1.753 m)   Wt 245 lb (111.1 kg)   BMI 36.18 kg/m² Pain Score:   6      Physical Exam  Constitutional:       Appearance: Normal appearance. Skin:     General: Skin is warm and dry. Neurological:      Mental Status: He is alert.          Ortho Exam   Examination lumbar spine revealed the neurovascular muscular status to be intact mediocre range of motion with only about 30 degrees of flexion 10 degrees of extension palpable tenderness mostly over the left SI joint with positive SI signs equivocal tension signs on the left    Assessment:      Diagnosis Orders   1. Sacroiliitis (HCC)  ID INJECT TRIGGER POINT, 1 OR 2    dexamethasone (PF) (DECADRON) injection 10 mg    lidocaine 1 % injection 5 mL   2. Osteoarthritis of spine with radiculopathy, lumbar region         Plan:   Regarding the patient's osteoarthritis in the spine and wanted to continue with his physical therapy and wants see him back in 3 weeks regarding sacroiliitis I did inject him today with relief of symptoms we will also follow-up with that in 3 weeks and consider further testing    SI joint injection  Dx: left Sacroilitis  Yes  After informed consent was provided and allergies verified, the patient was positioned appropriately. The SI joint was prepped and draped with betadine to provide sterile environment. After prepping and draping the Left SI joint in the usual sterile fashion, 1 cc of dexone with 4 cc of lidocaine were injected without any complications. Patient tolerated this well. Before aspiration/injection risks/benefits of a cortisone injection including infection, local skin irritation, skin atrophy, calcification, continued pain/discomfort, elevated blood sugar, burning, failure to relieve pain, possible late infection were discussed with patient. Post-procedure discomfort can be alleviated with additional medications/ice/elevation/rest over the first 24 hours as recommended. Patient verbalized understanding and wanted to proceed with planned procedure.     If fluid was aspirated it was sent for further studies  in sterile specimen container as ordered to the lab       Orders Placed This Encounter   Medications    dexamethasone (PF) (DECADRON) injection 10 mg    lidocaine 1 % injection 5 mL       Orders Placed This Encounter   Procedures    ID INJECT TRIGGER POINT, 1 OR 2         Follow up:  Return in about 3 weeks (around 12/10/2021).     LEVI LASSITER, DO

## 2021-12-06 ENCOUNTER — HOSPITAL ENCOUNTER (OUTPATIENT)
Dept: PHYSICAL THERAPY | Age: 36
Setting detail: THERAPIES SERIES
Discharge: HOME | End: 2021-12-06
Payer: MEDICAID

## 2021-12-06 PROCEDURE — 97162 PT EVAL MOD COMPLEX 30 MIN: CPT

## 2021-12-06 ASSESSMENT — PAIN DESCRIPTION - LOCATION: LOCATION: BACK

## 2021-12-06 ASSESSMENT — PAIN SCALES - GENERAL: PAINLEVEL_OUTOF10: 7

## 2021-12-06 ASSESSMENT — PAIN DESCRIPTION - ONSET: ONSET: ON-GOING

## 2021-12-06 ASSESSMENT — PAIN DESCRIPTION - PAIN TYPE: TYPE: CHRONIC PAIN

## 2021-12-06 ASSESSMENT — PAIN DESCRIPTION - FREQUENCY: FREQUENCY: CONTINUOUS

## 2021-12-06 ASSESSMENT — PAIN DESCRIPTION - DESCRIPTORS: DESCRIPTORS: STABBING;BURNING

## 2021-12-06 ASSESSMENT — PAIN DESCRIPTION - ORIENTATION: ORIENTATION: LEFT

## 2021-12-06 ASSESSMENT — PAIN DESCRIPTION - DIRECTION: RADIATING_TOWARDS: LEFT FOOT

## 2021-12-06 NOTE — PROGRESS NOTES
[address]  PHYSICAL THERAPY PLAN OF CARE   [address] [place], [address] [facility]         Ph: [phone]  Fax: [phone]    [] Certification  [] Recertification [x]  Plan of Care  [] Progress Note [] Discharge      To:  Dr. Ashley Bullock D)      From:  Dallas Amato, PT  Patient: Ji Bishop     : 1985  Diagnosis: OA of spine with radiculopathy, lumbar region     Date: 2021  Treatment Diagnosis: LBP with radiating symptoms down L LE and core weakness. Problem list:  1. Decreased core strength   2. Decreased endurance  3. Moderate to significant pain levels  4. Decreased functional mobility (entering/exiting semi), 5. Decreased flexibility in the R IT band    Plan of Care/Certification Expiration Date: 22  Progress Report Period from:  2021  to 2021    Total # of Visits to Date: 1   No Show: 0    Canceled Appointment: 0     OBJECTIVE:   Short Term Goals - Time Frame for Short term goals: 6 visits    Goals Current/Discharge status  Met   Short term goal 1: The pt will demonstrate improved core strength so as not to exhibit tremoring with core or LE stengthening exs  3/5 [] yes  [x] no   Short term goal 2: Pt will exhibit improved endurance as exhbited by completing > 30 min of exercises without an increase in pain or tremoring of weak muscles.    Tremoring of core and LE with testing [] yes  [x] no   Short term goal 3: Pt will exhibit a decrease in pain levels to 5/10  7/10 [] yes  [x] no   Short term goal 4: Pt will exhibit no restricted regions in the R IT band  Moderate restrictions present proximally [] yes  [x] no   Long Term Goals - Time Frame for Long term goals : 12 visits  Goals Current/ Discharge status Met   Long term goal 1: Pt will exhibit improved core strength to 4+/5 3/5 [] yes  [x] no   Long term goal 2: Pt will exhibit improved endurance with completion of 45 min of work sim and/or strengthening exercises maintaining neutral postural alignment Pain and weakness limiting activity levels [] yes  [x] no   Long term goal 3: Pt will exhibit a decrease in pain levels to 3/10 7/10 [] yes  [x] no   Long term goal 4: Pt will exhibit ability to enter/exit simulated semi cab without an increase in pain levels Increases pain [] yes  [x] no   Long term goal 5: The pt will be indep with HEP to further increase functional activity tolerance To be initiated [] yes  [] no        Body structures, Functions, Activity limitations: Decreased functional mobility , Increased pain, Decreased ADL status, Decreased strength, Decreased endurance (R IT band soft tissue restrictions)  Assessment: Pt presents to PT with an exacerbation of chronic low back pain. Recently, he has begun to have radicular symptoms down the LLE and has begun to exhibit tremoring in his core and LLE with activity, primarily stair negotiation. 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 Recommend pt continue with HEP. [] Additional visits requested, Please re-certify for additional visits:          Signature: Electronically signed by Jes Chaparro PT on 12/6/21 at 5:04 PM EST      If you have any questions or concerns, please don't hesitate to call.   Thank you for your referral.    I have reviewed this plan of care and certify a need for medically necessary rehabilitation services.     Physician Signature:__________________________________________________________  Date:  Please sign and return

## 2021-12-06 NOTE — PROGRESS NOTES
[facility]  PHYSICAL THERAPY EVALUATION    Date: 2021  Patient Name: Mary Grace Joshua       MRN: [id number]   Account: [de-identified]   : 1985  (39 y.o.)   Gender: male   Referring Practitioner: Dr. Alix Mackenzie, D)                 Diagnosis: OA of spine with radiculopathy, lumbar region  Treatment Diagnosis: LBP with radiating symptoms down L LE and core weakness. Problem list:  1. Decreased core strength   2. Decreased endurance  3. Moderate to significant pain levels  4. Decreased functional mobility (entering/exiting semi), 5. Decreased flexibility in the R IT band    Past Medical History:  has a past medical history of Arthritis, Asthma, Chronic back pain, HA (headache), Pain and swelling of lower leg, and Varicose veins of right lower extremity with pain. Past Surgical History:   has a past surgical history that includes Appendectomy.   Vital Signs  Patient Currently in Pain: Yes   Pain Screening  Patient Currently in Pain: Yes  Pain Assessment  Pain Assessment: 0-10  Pain Level: 7  Pain Type: Chronic pain  Pain Location: Back  Pain Orientation: Left  Pain Radiating Towards: Left foot  Pain Descriptors: Stabbing; Burning  Pain Frequency: Continuous  Pain Onset: On-going   Lives With: Family  Type of Home: House  Home Layout: Two level  Home Access: Level entry  ADL Assistance: Needs assistance (when pain level excedd 8/10)  Bath: Minimal assistance  Dressing: Independent  Ambulation Assistance: Independent  Active : Yes  Mode of Transportation: Car  Occupation: Unemployed  Type of occupation:    Subjective:  Subjective: Pt reports he has had chronic back pain that has exacerbated recently with numbness and tingling raditating posteriorly down to foot   Objective:    Balance  Posture: Good  Sitting - Static: Good  Sitting - Dynamic: Good  Standing - Static: Good  Tandem Stance L Le  Single Leg Stance R Leg: 10  Comments: Instability at the LLE Plan  Frequency/Duration:  Plan  Times per week: 2  Plan weeks: 6  Current Treatment Recommendations: Strengthening, Functional Mobility Training, Neuromuscular Re-education, Manual Therapy - Soft Tissue Mobilization, Manual Therapy - Joint Manipulation, Home Exercise Program, Modalities   POST-PAIN     Pain Rating (0-10 pain scale):  7 /10  Location and pain description same as pre-treatment unless indicated. Action: [] NA  [] Call Physician  [] Perform HEP  [x] Meds as prescribed    Evaluation and patient rights have been reviewed and patient agrees with plan of care. Yes  [x]  No  []   Explain:       Marcus Fall Risk Assessment  Risk Factor Scale  Score   History of Falls [] Yes  [x] No 25  0    Secondary Diagnosis [] Yes  [x] No 15  0    Ambulatory Aid [] Furniture  [] Crutches/cane/walker  [x] None/bedrest/wheelchair/nurse 30  15  0    IV/Heparin Lock [] Yes  [x] No 20  0    Gait/Transferring [] Impaired  [] Weak  [x] Normal/bedrest/immobile 20  10  0    Mental Status [] Forgets limitations  [x] Oriented to own ability 15  0       Total: 0     Based on the Assessment score: check the appropriate box. [x]  No intervention needed   Low =   Score of 0-24  []  Use standard prevention interventions Moderate =  Score of 24-44   [] Discuss fall prevention strategies   [] Indicate moderate falls risk on eval  []  Use high risk prevention interventions High = Score of 45 and higher   [] Discuss fall prevention strategies   [] Provide supervision during treatment time    Goals  Short term goals  Time Frame for Short term goals: 6 visits  Short term goal 1: The pt will demonstrate improved core strength so as not to exhibit tremoring with core or LE stengthening exs  Short term goal 2: Pt will exhibit improved endurance as exhbited by completing > 30 min of exercises without an increase in pain or tremoring of weak muscles.   3  Short term goal 3: Pt will exhibit a decrease in pain levels to 5/10  Short term goal 4: Pt will exhibit no restricted regions in the R IT band  Long term goals  Time Frame for Long term goals : 12 visits  Long term goal 1: Pt will exhibit improved core strength to 4+/5  Long term goal 2: Pt will exhibit improved endurance with completion of 45 min of work sim and/or strengthening exercises maintaining neutral postural alignment  Long term goal 3: Pt will exhibit a decrease in pain levels to 3/10  Long term goal 4: Pt will exhibit ability to enter/exit simulated semi cab without an increase in pain levels  Long term goal 5:  The pt will be indep with HEP to further increase functional activity tolerance  PT Individual Minutes  Time In: 1305  Time Out: 1402  Minutes: 57  Timed Code Treatment Minutes: 0 Minutes  Procedure Minutes: 57 moderate complexity eval     Electronically signed by Danyelle Cates PT on 12/6/21 at 4:59 PM EST

## 2021-12-23 NOTE — PROGRESS NOTES
[address]  PHYSICAL THERAPY PLAN OF CARE   [address], [address] [facility]         Ph: [phone]  Fax: [phone]    [] Certification  [] Recertification []  Plan of Care  [] Progress Note [] Discharge      To:  Dr. Rolando Enriquez, DO      From:  Nunu Asher, PT  Patient: Niurka Torres     : 1985  Diagnosis: OA of spine with radiculopathy, lumbar region     Date: 2021  Treatment Diagnosis: LBP with radiating symptoms down L LE and core weakness. Problem list:  1. Decreased core strength   2. Decreased endurance  3. Moderate to significant pain levels  4. Decreased functional mobility (entering/exiting semi), 5. Decreased flexibility in the R IT band    Plan of Care/Certification Expiration Date: 22  Progress Report Period from:  2021  to 2021    Total # of Visits to Date: 1   No Show: 3  Canceled Appointment: 0     OBJECTIVE:   Short Term Goals - Time Frame for Short term goals: 6 visits    Goals Current/Discharge status  Met   Short term goal 1: The pt will demonstrate improved core strength so as not to exhibit tremoring with core or LE stengthening exs  NT due to unplanned D/C [] yes  [x] no   Short term goal 2: Pt will exhibit improved endurance as exhbited by completing > 30 min of exercises without an increase in pain or tremoring of weak muscles.   3  NT due to unplanned D/C    [] yes  [x] no   Short term goal 3: Pt will exhibit a decrease in pain levels to 5/10  NT due to unplanned D/C [] yes  [x] no   Short term goal 4: Pt will exhibit no restricted regions in the R IT band  NT due to unplanned D/C [] yes  [x] no     Long Term Goals - Time Frame for Long term goals : 12 visits  Goals Current/ Discharge status Met   Long term goal 1: Pt will exhibit improved core strength to 4+/5 NT due to unplanned D/C [] yes  [x] no   Long term goal 2: Pt will exhibit improved endurance with completion of 45 min of work sim and/or strengthening exercises maintaining neutral postural alignment NT due to unplanned D/C [] yes  [x] no   Long term goal 3: Pt will exhibit a decrease in pain levels to 3/10 NT due to unplanned D/C [] yes  [x] no   Long term goal 4: Pt will exhibit ability to enter/exit simulated semi cab without an increase in pain levels NT due to unplanned D/C [] yes  [x] no   Long term goal 5: The pt will be indep with HEP to further increase functional activity tolerance NT due to unplanned D/C [] yes  [x] no    Body structures, Functions, Activity limitations: Decreased functional mobility ,Increased pain,Decreased ADL status,Decreased strength,Decreased endurance (R IT band soft tissue restrictions)    Assessment: Pt presented to PT with an exacerbation of chronic low back pain. Pt was scheduled for multiple appointments and did not show for treatment and therefore is discharged from PT services. PLAN: []                 Patient Status:[] Continue/ Initiate plan of Care    [x] Discharge PT. Recommend pt continue with HEP. [] Additional visits requested, Please re-certify for additional visits:          Signature: Electronically signed by Konstantin Dennis PT on 12/23/21 at 8:21 AM EST      If you have any questions or concerns, please don't hesitate to call. Thank you for your referral.    I have reviewed this plan of care and certify a need for medically necessary rehabilitation services.     Physician Signature:__________________________________________________________  Date:  Please sign and return

## 2021-12-31 ENCOUNTER — HOSPITAL ENCOUNTER (EMERGENCY)
Age: 36
Discharge: HOME | End: 2021-12-31
Payer: MEDICAID

## 2021-12-31 VITALS
WEIGHT: 248 LBS | SYSTOLIC BLOOD PRESSURE: 131 MMHG | TEMPERATURE: 98.1 F | HEART RATE: 87 BPM | RESPIRATION RATE: 19 BRPM | DIASTOLIC BLOOD PRESSURE: 92 MMHG | OXYGEN SATURATION: 96 % | HEIGHT: 69 IN | BODY MASS INDEX: 36.73 KG/M2

## 2021-12-31 DIAGNOSIS — H66.90 ACUTE OTITIS MEDIA, UNSPECIFIED OTITIS MEDIA TYPE: Primary | ICD-10-CM

## 2021-12-31 DIAGNOSIS — J02.9 ACUTE PHARYNGITIS, UNSPECIFIED ETIOLOGY: ICD-10-CM

## 2021-12-31 PROCEDURE — 99284 EMERGENCY DEPT VISIT MOD MDM: CPT

## 2021-12-31 PROCEDURE — 6370000000 HC RX 637 (ALT 250 FOR IP): Performed by: PHYSICIAN ASSISTANT

## 2021-12-31 RX ORDER — AMOXICILLIN AND CLAVULANATE POTASSIUM 875; 125 MG/1; MG/1
1 TABLET, FILM COATED ORAL 2 TIMES DAILY
Qty: 20 TABLET | Refills: 0 | Status: SHIPPED | OUTPATIENT
Start: 2021-12-31 | End: 2022-01-10

## 2021-12-31 RX ORDER — NAPROXEN 500 MG/1
500 TABLET ORAL 2 TIMES DAILY
Qty: 20 TABLET | Refills: 0 | Status: SHIPPED | OUTPATIENT
Start: 2021-12-31 | End: 2022-04-29

## 2021-12-31 RX ORDER — IBUPROFEN 800 MG/1
800 TABLET ORAL ONCE
Status: COMPLETED | OUTPATIENT
Start: 2021-12-31 | End: 2021-12-31

## 2021-12-31 RX ORDER — AMOXICILLIN AND CLAVULANATE POTASSIUM 875; 125 MG/1; MG/1
1 TABLET, FILM COATED ORAL ONCE
Status: COMPLETED | OUTPATIENT
Start: 2021-12-31 | End: 2021-12-31

## 2021-12-31 RX ORDER — TRAMADOL HYDROCHLORIDE 50 MG/1
50 TABLET ORAL EVERY 6 HOURS PRN
Qty: 12 TABLET | Refills: 0 | Status: SHIPPED | OUTPATIENT
Start: 2021-12-31 | End: 2022-01-03

## 2021-12-31 RX ORDER — ACETAMINOPHEN 500 MG
1000 TABLET ORAL ONCE
Status: COMPLETED | OUTPATIENT
Start: 2021-12-31 | End: 2021-12-31

## 2021-12-31 RX ADMIN — AMOXICILLIN AND CLAVULANATE POTASSIUM 1 TABLET: 875; 125 TABLET, FILM COATED ORAL at 18:30

## 2021-12-31 RX ADMIN — ACETAMINOPHEN 1000 MG: 500 TABLET ORAL at 18:30

## 2021-12-31 RX ADMIN — IBUPROFEN 800 MG: 800 TABLET, FILM COATED ORAL at 18:30

## 2021-12-31 ASSESSMENT — PAIN SCALES - GENERAL
PAINLEVEL_OUTOF10: 10
PAINLEVEL_OUTOF10: 10

## 2021-12-31 NOTE — ED PROVIDER NOTES
[facility] [facility] ED  eMERGENCY dEPARTMENT eNCOUnter      Pt Name: Ray Calderon  MRN: [id number]  [place] 1985  Date of evaluation: 12/31/2021  Provider: Amy Blackwell PA-C    CHIEF COMPLAINT       Chief Complaint   Patient presents with    Cough     congestion, sore throat, difficulty swallowing,  left ear pain x 3 days         HISTORY OF PRESENT ILLNESS   (Location/Symptom, Timing/Onset,Context/Setting, Quality, Duration, Modifying Factors, Severity)  Note limiting factors. Ray Calderon is a 39 y.o. male who presents to the emergency department  Pt has three day hx of lt > rt ear pain with hx otitis media. Pt has sore throat and swollen glands and congestion. He con take ibuprofen 800 mg without reaction. Symptoms mild to moderate in severity. HPI    NursingNotes were reviewed. REVIEW OF SYSTEMS    (2-9 systems for level 4, 10 or more for level 5)     Review of Systems   Constitutional: Negative for activity change, appetite change and unexpected weight change. HENT: Positive for congestion, ear pain and trouble swallowing. Negative for ear discharge, nosebleeds and voice change. Eyes: Negative for discharge. Respiratory: Negative for apnea and cough. Cardiovascular: Negative for chest pain. Gastrointestinal: Negative for abdominal distention, anal bleeding and vomiting. Genitourinary: Negative for flank pain and hematuria. Musculoskeletal: Negative for back pain, joint swelling and neck stiffness. Skin: Negative for pallor. Hematological: Does not bruise/bleed easily. All other systems reviewed and are negative. Except as noted above the remainder of the review of systems was reviewed and negative.        PAST MEDICAL HISTORY     Past Medical History:   Diagnosis Date    Arthritis     Asthma     Chronic back pain     HA (headache)     Pain and swelling of lower leg 11/16/2021    Varicose veins of right lower extremity with pain 11/16/2021 SURGICALHISTORY       Past Surgical History:   Procedure Laterality Date    APPENDECTOMY           CURRENT MEDICATIONS       Previous Medications    ALBUTEROL (PROVENTIL) (2.5 MG/3ML) 0.083% NEBULIZER SOLUTION    Take 3 mLs by nebulization every 6 hours as needed for Wheezing    ALBUTEROL SULFATE HFA (PROAIR HFA) 108 (90 BASE) MCG/ACT INHALER    Use every 4 hours while awake for 7-10 days then PRN wheezing  Dispense with SPACER and Instruct on use. May sub Ventolin or Proventil as needed per [employer]. ELASTIC BANDAGES & SUPPORTS (MEDICAL COMPRESSION STOCKINGS) MISC    1 each by Does not apply route daily Thigh high 20-30 mmhg compression stockings both legs wear daily during day and off Qhs. Wear as tolerated. Do not wear if they cause increased pain. OMEPRAZOLE (PRILOSEC) 40 MG DELAYED RELEASE CAPSULE    Take 1 capsule by mouth every morning (before breakfast)    RESPIRATORY THERAPY SUPPLIES (NEBULIZER/TUBING/MOUTHPIECE) KIT    1 kit by Does not apply route daily as needed (sob every 4-6 hours)            Asa [aspirin]    FAMILY HISTORY       Family History   Problem Relation Age of Onset    Hypothyroidism Mother     Arthritis Mother     Hypertension Mother     Coronary Art Dis Paternal Grandmother     Heart Attack Paternal Grandmother     Hypertension Father           SOCIAL HISTORY       Social History     Socioeconomic History    Marital status:      Spouse name: None    Number of children: None    Years of education: None    Highest education level: None   Occupational History    None   Tobacco Use    Smoking status: Current Every Day Smoker     Packs/day: 0.00     Types: E-Cigarettes    Smokeless tobacco: Never Used    Tobacco comment: Pt smokes with vapes that contains nicotine.    Vaping Use    Vaping Use: Every day    Substances: Nicotine    Devices: Pre-filled pod   Substance and Sexual Activity    Alcohol use: Yes     Comment: socially    Drug use: Never    Sexual activity: Yes     Partners: Female   Other Topics Concern    None   Social History Narrative    Work at the [employer]     Has 2 children     Has 4 siblings. Not in close contact     Born in [place]. [place]oved to [place]    Hobbies: none. Social Determinants of Health     Financial Resource Strain: Low Risk     Difficulty of Paying Living Expenses: Not hard at all   Food Insecurity: No Food Insecurity    Worried About Running Out of Food in the Last Year: Never true    [place] of Food in the Last Year: Never true   Transportation Needs:     Lack of Transportation (Medical): Not on file    Lack of Transportation (Non-Medical):  Not on file   Physical Activity:     Days of Exercise per Week: Not on file    Minutes of Exercise per Session: Not on file   Stress:     Feeling of Stress : Not on file   Social Connections:     Frequency of Communication with Friends and Family: Not on file    Frequency of Social Gatherings with Friends and Family: Not on file    Attends [employer] Services: Not on file    Active Member of [address] [place] [address] [employer] or Organizations: Not on file    Attends Club or Organization Meetings: Not on file    Marital Status: Not on file   Intimate Partner Violence:     Fear of Current or Ex-Partner: Not on file    Emotionally Abused: Not on file    Physically Abused: Not on file    Sexually Abused: Not on file   Housing Stability:     Unable to Pay for Housing in the Last Year: Not on file    Number of [place] in the Last Year: Not on file    Unstable Housing in the Last Year: Not on file       SCREENINGS   Ebola Virus Disease (EVD) Screening   Temp: 98.1 °F (36.7 °C)  CIWA Assessment  BP: (!) 131/92  Pulse: 87    PHYSICAL EXAM    (up to 7 for level 4, 8 or more for level 5)     ED Triage Vitals [12/31/21 1801]   BP Temp Temp Source Pulse Resp SpO2 Height Weight   (!) 131/92 98.1 °F (36.7 °C) Oral 87 19 96 % 5' 9\" (1.753 m) 248 lb (112.5 kg)       Physical Exam  Vitals and nursing note reviewed. Constitutional:       General: He is not in acute distress. Appearance: He is well-developed. He is not ill-appearing. HENT:      Head: Normocephalic and atraumatic. Right Ear: External ear normal. There is no impacted cerumen. Left Ear: External ear normal. There is no impacted cerumen. Ears:      Comments: Rt tm erythema. Lt tm opacified and bluging. Nose: Nose normal.      Mouth/Throat:      Mouth: Mucous membranes are moist.      Pharynx: No oropharyngeal exudate or posterior oropharyngeal erythema. Eyes:      General:         Right eye: No discharge. Left eye: No discharge. Pupils: Pupils are equal, round, and reactive to light. Cardiovascular:      Rate and Rhythm: Normal rate and regular rhythm. Pulses: Normal pulses. Heart sounds: Normal heart sounds. Pulmonary:      Effort: Pulmonary effort is normal. No respiratory distress. Breath sounds: Normal breath sounds. No stridor. Abdominal:      General: Bowel sounds are normal. There is no distension. Palpations: Abdomen is soft. Tenderness: There is no abdominal tenderness. Musculoskeletal:         General: Normal range of motion. Cervical back: Normal range of motion and neck supple. No rigidity. Skin:     General: Skin is warm. Findings: No erythema. Neurological:      Mental Status: He is alert and oriented to person, place, and time.       Gait: Gait normal.   Psychiatric:         Mood and Affect: Mood normal.         RESULTS     EKG: All EKG's are interpreted by the Emergency Department Physician who either signs or Co-signsthis chart in the absence of a cardiologist.         RADIOLOGY:   Non-plain filmimages such as CT, Ultrasound and MRI are read by the radiologist. Plain radiographic images are visualized and preliminarily interpreted by the emergency physician with the below findings:         Interpretation per the Radiologist below, if available at the time ofthis note:    No orders to display         ED BEDSIDE ULTRASOUND:   Performed by ED Physician - none    LABS:  Labs Reviewed - No data to display    All other labs were within normal range or not returned as of this dictation. EMERGENCY DEPARTMENT COURSE and DIFFERENTIAL DIAGNOSIS/MDM:   Vitals:    Vitals:    12/31/21 1801   BP: (!) 131/92   Pulse: 87   Resp: 19   Temp: 98.1 °F (36.7 °C)   TempSrc: Oral   SpO2: 96%   Weight: 248 lb (112.5 kg)   Height: 5' 9\" (1.753 m)            MDM  Number of Diagnoses or Management Options          CONSULTS:  None    PROCEDURES:  Unless otherwise noted below, none     Procedures    FINAL IMPRESSION      1. Acute otitis media, unspecified otitis media type    2. Acute pharyngitis, unspecified etiology          DISPOSITION/PLAN   DISPOSITION        PATIENT REFERRED TO:  Román Chavis MD  [address] [place] [address]  [phone]    Call in 1 day      Ailyn Patel MD  [address] [phone]      If symptoms worsen    [facility]) ED  [address] [address]  [phone]    If symptoms worsen      DISCHARGE MEDICATIONS:  New Prescriptions    AMOXICILLIN-CLAVULANATE (AUGMENTIN) 875-125 MG PER TABLET    Take 1 tablet by mouth 2 times daily for 10 days    TRAMADOL (ULTRAM) 50 MG TABLET    Take 1 tablet by mouth every 6 hours as needed for Pain for up to 3 days. Intended supply: 3 days.  Take lowest dose possible to manage pain          (Please note that portions of this note were completed with a voice recognition program.  Efforts were made to edit the dictations but occasionally words are mis-transcribed.)    France Mccarty PA-C (electronically signed)  Attending Emergency Physician       France Mccarty PA-C  12/31/21 [id number]

## 2022-03-22 DIAGNOSIS — K21.9 GASTROESOPHAGEAL REFLUX DISEASE WITHOUT ESOPHAGITIS: ICD-10-CM

## 2022-03-23 RX ORDER — OMEPRAZOLE 40 MG/1
CAPSULE, DELAYED RELEASE ORAL
Qty: 30 CAPSULE | Refills: 5 | Status: SHIPPED | OUTPATIENT
Start: 2022-03-23 | End: 2022-05-03

## 2022-03-23 NOTE — TELEPHONE ENCOUNTER
Rx request   Requested Prescriptions     Pending Prescriptions Disp Refills    omeprazole (PRILOSEC) 40 MG delayed release capsule [Pharmacy Med Name: OMEPRAZOLE 40MG CAPSULES] 30 capsule 3     Sig: TAKE 1 CAPSULE BY MOUTH EVERY MORNING BEFORE BREAKFAST     [facility] 9/27/2021  Next Visit Date:  No future appointments.

## 2022-04-29 ENCOUNTER — HOSPITAL ENCOUNTER (EMERGENCY)
Age: 37
Discharge: HOME | End: 2022-04-29
Payer: MEDICAID

## 2022-04-29 ENCOUNTER — APPOINTMENT (OUTPATIENT)
Dept: GENERAL RADIOLOGY | Age: 37
End: 2022-04-29
Payer: MEDICAID

## 2022-04-29 VITALS
DIASTOLIC BLOOD PRESSURE: 95 MMHG | BODY MASS INDEX: 38.51 KG/M2 | OXYGEN SATURATION: 97 % | TEMPERATURE: 97.2 F | SYSTOLIC BLOOD PRESSURE: 138 MMHG | RESPIRATION RATE: 18 BRPM | HEIGHT: 69 IN | HEART RATE: 106 BPM | WEIGHT: 260 LBS

## 2022-04-29 DIAGNOSIS — M54.30 SCIATICA, UNSPECIFIED LATERALITY: ICD-10-CM

## 2022-04-29 DIAGNOSIS — S39.012A STRAIN OF LUMBAR REGION, INITIAL ENCOUNTER: Primary | ICD-10-CM

## 2022-04-29 PROCEDURE — 6360000002 HC RX W HCPCS: Performed by: STUDENT IN AN ORGANIZED HEALTH CARE EDUCATION/TRAINING PROGRAM

## 2022-04-29 PROCEDURE — 72110 X-RAY EXAM L-2 SPINE 4/>VWS: CPT

## 2022-04-29 PROCEDURE — 6370000000 HC RX 637 (ALT 250 FOR IP): Performed by: STUDENT IN AN ORGANIZED HEALTH CARE EDUCATION/TRAINING PROGRAM

## 2022-04-29 PROCEDURE — 96372 THER/PROPH/DIAG INJ SC/IM: CPT

## 2022-04-29 PROCEDURE — 99284 EMERGENCY DEPT VISIT MOD MDM: CPT

## 2022-04-29 RX ORDER — NAPROXEN 500 MG/1
500 TABLET ORAL 2 TIMES DAILY WITH MEALS
Qty: 30 TABLET | Refills: 0 | Status: SHIPPED | OUTPATIENT
Start: 2022-04-29 | End: 2022-05-03

## 2022-04-29 RX ORDER — KETOROLAC TROMETHAMINE 30 MG/ML
30 INJECTION, SOLUTION INTRAMUSCULAR; INTRAVENOUS ONCE
Status: COMPLETED | OUTPATIENT
Start: 2022-04-29 | End: 2022-04-29

## 2022-04-29 RX ORDER — METHYLPREDNISOLONE ACETATE 80 MG/ML
80 INJECTION, SUSPENSION INTRA-ARTICULAR; INTRALESIONAL; INTRAMUSCULAR; SOFT TISSUE ONCE
Status: COMPLETED | OUTPATIENT
Start: 2022-04-29 | End: 2022-04-29

## 2022-04-29 RX ORDER — CYCLOBENZAPRINE HCL 10 MG
10 TABLET ORAL NIGHTLY PRN
Qty: 10 TABLET | Refills: 0 | Status: SHIPPED | OUTPATIENT
Start: 2022-04-29 | End: 2022-05-09

## 2022-04-29 RX ORDER — CYCLOBENZAPRINE HCL 10 MG
10 TABLET ORAL ONCE
Status: COMPLETED | OUTPATIENT
Start: 2022-04-29 | End: 2022-04-29

## 2022-04-29 RX ADMIN — CYCLOBENZAPRINE 10 MG: 10 TABLET, FILM COATED ORAL at 19:09

## 2022-04-29 RX ADMIN — KETOROLAC TROMETHAMINE 30 MG: 30 INJECTION, SOLUTION INTRAMUSCULAR at 19:09

## 2022-04-29 RX ADMIN — METHYLPREDNISOLONE ACETATE 80 MG: 80 INJECTION, SUSPENSION INTRA-ARTICULAR; INTRALESIONAL; INTRAMUSCULAR; SOFT TISSUE at 19:09

## 2022-04-29 ASSESSMENT — PAIN DESCRIPTION - ORIENTATION: ORIENTATION: LEFT

## 2022-04-29 ASSESSMENT — ENCOUNTER SYMPTOMS
CHEST TIGHTNESS: 0
NAUSEA: 0
DIARRHEA: 0
EYE PAIN: 0
BACK PAIN: 1
SHORTNESS OF BREATH: 0
SORE THROAT: 0

## 2022-04-29 ASSESSMENT — PAIN - FUNCTIONAL ASSESSMENT: PAIN_FUNCTIONAL_ASSESSMENT: 0-10

## 2022-04-29 ASSESSMENT — PAIN DESCRIPTION - LOCATION: LOCATION: BACK

## 2022-04-29 ASSESSMENT — PAIN SCALES - GENERAL
PAINLEVEL_OUTOF10: 8
PAINLEVEL_OUTOF10: 8

## 2022-04-29 NOTE — ED PROVIDER NOTES
[facility] ED  eMERGENCYdEPARTMENT eNCOUnter      Pt Name: Mike Latif  MRN: [id number]  [place] 1985  Date of evaluation: 4/29/2022  Provider:Filemon Lynch PA-C    CHIEF COMPLAINT           HPI  Mike Latif is a 39 y.o. male per chart review has a h/o OA of spine with radiculopathy, sacroiliitis presenting with low back pain. Patient reports sudden onset, severe, sharp, radiating down the left side back pain that has been ongoing for the past few days. Patient denies any history of back pain. Patient shin denies bowel or bladder incontinence, saddle anesthesia, foot drop, IV drug use. ROS  Review of Systems   Constitutional: Negative for chills, fatigue and fever. HENT: Negative for ear pain, hearing loss and sore throat. Eyes: Negative for pain and visual disturbance. Respiratory: Negative for chest tightness and shortness of breath. Cardiovascular: Negative for chest pain. Gastrointestinal: Negative for diarrhea and nausea. Endocrine: Negative for cold intolerance. Genitourinary: Negative for hematuria. Musculoskeletal: Positive for back pain. Skin: Negative for rash and wound. Neurological: Negative for dizziness and headaches. Psychiatric/Behavioral: Negative for behavioral problems and confusion. Except as noted above the remainder of the review of systems was reviewed and negative.        PAST MEDICAL HISTORY     Past Medical History:   Diagnosis Date    Arthritis     Asthma     Chronic back pain     HA (headache)     Pain and swelling of lower leg 11/16/2021    Varicose veins of right lower extremity with pain 11/16/2021         SURGICAL HISTORY       Past Surgical History:   Procedure Laterality Date    APPENDECTOMY           CURRENTMEDICATIONS       Discharge Medication List as of 4/29/2022  7:58 PM      CONTINUE these medications which have NOT CHANGED    Details   omeprazole (PRILOSEC) 40 MG delayed release capsule TAKE 1 CAPSULE BY MOUTH EVERY MORNING BEFORE BREAKFAST, Disp-30 capsule, [id number] refills remain on this prescription. Your patient is requesting advance approval of refills for this medication to [facility] ([facility]) [address] [place] [address] Starting Mon 11/15/2021, Disp-1 each, R-5, PrintThigh high 20-30 mmhg compression stockings both legs wear daily during day and off Qhs. Wear as tolerated. Do not wear if they cause increased pain. Respiratory Therapy Supplies (NEBULIZER/TUBING/MOUTHPIECE) KIT DAILY PRN Starting Fri 3/20/2020, Disp-1 kit, R-0, Print      albuterol (PROVENTIL) (2.5 MG/3ML) 0.083% nebulizer solution Take 3 mLs by nebulization every 6 hours as needed for Wheezing, Disp-120 each, R-0Print      albuterol sulfate HFA (PROAIR HFA) 108 (90 Base) MCG/ACT inhaler Use every 4 hours while awake for 7-10 days then PRN wheezing  Dispense with SPACER and Instruct on use.   May sub Ventolin or Proventil as needed per Insurance., Disp-1 Inhaler, R-1Print             ALLERGIES     Aspirin    FAMILY HISTORY       Family History   Problem Relation Age of Onset   Floydene Ada Hypothyroidism Mother     Arthritis Mother     Hypertension Mother     Coronary Art Dis Paternal Grandmother     Heart Attack Paternal Grandmother     Hypertension Father           SOCIAL HISTORY       Social History     Socioeconomic History    Marital status:      Spouse name: None    Number of children: None    Years of education: None    Highest education level: None   Occupational History    None   Tobacco Use    Smoking status: Current Every Day Smoker     Packs/day: 0.00     Types: Cigarettes    Smokeless tobacco: Never Used    Tobacco comment: Quit 1yr ago   Vaping Use    Vaping Use: Every day    Substances: Nicotine    Devices: Pre-filled pod   Substance and Sexual Activity    Alcohol use: Yes     Comment: socially    Drug use: Never    Sexual activity: Yes     Partners: Female Other Topics Concern    None   Social History Narrative    Work at the [employer]     Has 2 children     Has 4 siblings. Not in close contact     Born in [place]. Moved to [place]    Hobbies: none. Social Determinants of Health     Financial Resource Strain: Low Risk     Difficulty of Paying Living Expenses: Not hard at all   Food Insecurity: No Food Insecurity    Worried About Running Out of Food in the Last Year: Never true    [place] of Food in the Last Year: Never true   Transportation Needs:     Lack of Transportation (Medical): Not on file    Lack of Transportation (Non-Medical): Not on file   Physical Activity:     Days of Exercise per Week: Not on file    Minutes of Exercise per Session: Not on file   Stress:     Feeling of Stress : Not on file   Social Connections:     Frequency of Communication with Friends and Family: Not on file    Frequency of Social Gatherings with Friends and Family: Not on file    Attends Hinduism Services: Not on file    Active Member of [address] [place] [address] or Organizations: Not on file    Attends Club or Organization Meetings: Not on file    Marital Status: Not on file   Intimate Partner Violence:     Fear of Current or Ex-Partner: Not on file    Emotionally Abused: Not on file    Physically Abused: Not on file    Sexually Abused: Not on file   Housing Stability:     Unable to Pay for Housing in the Last Year: Not on file    Number of [place] in the Last Year: Not on file    Unstable Housing in the Last Year: Not on file         PHYSICAL EXAM       ED Triage Vitals [04/29/22 1850]   BP Temp Temp Source Pulse Resp SpO2 Height Weight   (!) 138/95 97.2 °F (36.2 °C) Temporal 106 18 97 % 5' 9\" (1.753 m) 260 lb (117.9 kg)       Physical Exam  Constitutional:       Appearance: Normal appearance. HENT:      Head: Normocephalic and atraumatic.       Nose: Nose normal.      Mouth/Throat:      Mouth: Mucous membranes are moist.      Pharynx: No oropharyngeal exudate or posterior oropharyngeal erythema. Eyes:      Extraocular Movements: Extraocular movements intact. Conjunctiva/sclera: Conjunctivae normal.      Pupils: Pupils are equal, round, and reactive to light. Cardiovascular:      Rate and Rhythm: Normal rate and regular rhythm. Heart sounds: Normal heart sounds. Pulmonary:      Effort: Pulmonary effort is normal.      Breath sounds: Normal breath sounds. No wheezing or rhonchi. Abdominal:      General: Bowel sounds are normal.      Palpations: Abdomen is soft. Tenderness: There is no abdominal tenderness. There is no guarding. Musculoskeletal:         General: No deformity. Normal range of motion. Cervical back: Normal range of motion and neck supple. Back:    Skin:     General: Skin is warm and dry. Coloration: Skin is not jaundiced. Neurological:      General: No focal deficit present. Mental Status: He is alert and oriented to person, place, and time. Psychiatric:         Mood and Affect: Mood normal.         Behavior: Behavior normal.           MDM  This is a 63-year-old male presenting with back pain. Patient is afebrile and hemodynamically stable. Lumbar x-ray negative for fractures. Patient given IM Toradol, p.o. Flexeril, IM steroids. Patient reevaluated slightly improved. Patient agreeable to discharge with orthopedic follow-up and anti-inflammatory for pain. Will return if symptoms change or worsen. FINAL IMPRESSION      1. Strain of lumbar region, initial encounter    2.  Sciatica, unspecified laterality          DISPOSITION/PLAN   DISPOSITION          DISCHARGE MEDICATIONS:  [unfilled]         Nico Davis PA-C(electronically signed)  Attending Emergency Physician           Nico Davis PA-C  05/01/22 3991

## 2022-05-03 ENCOUNTER — OFFICE VISIT (OUTPATIENT)
Dept: FAMILY MEDICINE CLINIC | Age: 37
End: 2022-05-03
Payer: MEDICAID

## 2022-05-03 ENCOUNTER — TELEPHONE (OUTPATIENT)
Dept: FAMILY MEDICINE CLINIC | Age: 37
End: 2022-05-03

## 2022-05-03 VITALS
WEIGHT: 262 LBS | HEART RATE: 70 BPM | DIASTOLIC BLOOD PRESSURE: 78 MMHG | TEMPERATURE: 98.2 F | BODY MASS INDEX: 38.8 KG/M2 | SYSTOLIC BLOOD PRESSURE: 122 MMHG | HEIGHT: 69 IN | RESPIRATION RATE: 12 BRPM | OXYGEN SATURATION: 97 %

## 2022-05-03 DIAGNOSIS — M54.16 LUMBAR RADICULOPATHY: Primary | ICD-10-CM

## 2022-05-03 DIAGNOSIS — R06.81 APNEIC EPISODE: ICD-10-CM

## 2022-05-03 DIAGNOSIS — M47.816 LOCALIZED OSTEOARTHRITIS OF LUMBAR SPINE: ICD-10-CM

## 2022-05-03 DIAGNOSIS — R09.81 NASAL CONGESTION: ICD-10-CM

## 2022-05-03 DIAGNOSIS — R06.83 SNORING: ICD-10-CM

## 2022-05-03 DIAGNOSIS — N52.9 ERECTILE DYSFUNCTION, UNSPECIFIED ERECTILE DYSFUNCTION TYPE: ICD-10-CM

## 2022-05-03 PROBLEM — J45.901 MODERATE ACUTE EXACERBATION OF ASTHMA: Status: RESOLVED | Noted: 2022-05-03 | Resolved: 2022-05-03

## 2022-05-03 PROBLEM — J45.901 MODERATE ACUTE EXACERBATION OF ASTHMA: Status: ACTIVE | Noted: 2022-05-03

## 2022-05-03 PROCEDURE — G8417 CALC BMI ABV UP PARAM F/U: HCPCS | Performed by: FAMILY MEDICINE

## 2022-05-03 PROCEDURE — 4004F PT TOBACCO SCREEN RCVD TLK: CPT | Performed by: FAMILY MEDICINE

## 2022-05-03 PROCEDURE — G8427 DOCREV CUR MEDS BY ELIG CLIN: HCPCS | Performed by: FAMILY MEDICINE

## 2022-05-03 PROCEDURE — 99214 OFFICE O/P EST MOD 30 MIN: CPT | Performed by: FAMILY MEDICINE

## 2022-05-03 RX ORDER — LIDOCAINE 4 G/100G
PATCH TOPICAL
COMMUNITY
Start: 2022-04-24

## 2022-05-03 RX ORDER — MELOXICAM 7.5 MG/1
7.5 TABLET ORAL DAILY
Qty: 30 TABLET | Refills: 0 | Status: SHIPPED | OUTPATIENT
Start: 2022-05-03 | End: 2022-05-10

## 2022-05-03 RX ORDER — GABAPENTIN 300 MG/1
300 CAPSULE ORAL 2 TIMES DAILY
Qty: 60 CAPSULE | Refills: 0 | Status: SHIPPED | OUTPATIENT
Start: 2022-05-03 | End: 2022-05-09 | Stop reason: DRUGHIGH

## 2022-05-03 RX ORDER — OXYCODONE HYDROCHLORIDE 5 MG/1
TABLET ORAL
COMMUNITY
Start: 2022-04-24 | End: 2022-05-03

## 2022-05-03 RX ORDER — TADALAFIL 10 MG/1
TABLET ORAL
Qty: 10 TABLET | Refills: 1 | Status: SHIPPED | OUTPATIENT
Start: 2022-05-03 | End: 2022-08-01 | Stop reason: SDUPTHER

## 2022-05-03 RX ORDER — OXYMETAZOLINE HYDROCHLORIDE 0.05 G/100ML
2 SPRAY NASAL 2 TIMES DAILY
Qty: 12 ML | Refills: 11 | Status: SHIPPED | OUTPATIENT
Start: 2022-05-03 | End: 2023-05-03

## 2022-05-03 ASSESSMENT — PATIENT HEALTH QUESTIONNAIRE - PHQ9
2. FEELING DOWN, DEPRESSED OR HOPELESS: 0
SUM OF ALL RESPONSES TO PHQ QUESTIONS 1-9: 0
SUM OF ALL RESPONSES TO PHQ9 QUESTIONS 1 & 2: 0
1. LITTLE INTEREST OR PLEASURE IN DOING THINGS: 0
SUM OF ALL RESPONSES TO PHQ QUESTIONS 1-9: 0

## 2022-05-03 NOTE — TELEPHONE ENCOUNTER
----- Message from Lesley Aleman sent at 5/3/2022 11:11 AM EDT -----  Subject: Message to Provider    QUESTIONS  Information for Provider? Pt called in to inquire about his sleep study   lab. Pt stated he received a order for sleep study and pain management at   the same location. Pt would like to know if he is getting both done at the   same location or if the order was written like that on accident. Please   advise pt.   ---------------------------------------------------------------------------  --------------  CALL BACK INFO  What is the best way for the office to contact you? OK to leave message on   voicemail, OK to respond with electronic message via [employer] portal (only   for patients who have registered [employer] account)  Preferred Call Back Phone Number? [phone]  ---------------------------------------------------------------------------  --------------  SCRIPT ANSWERS  Relationship to Patient?  Self

## 2022-05-03 NOTE — PROGRESS NOTES
Chief Complaint   Patient presents with   [facility] ED Follow-up     back pain    Nasal Congestion     on going for years otc no help wife noticed he stops breathing at night        HPI: Aida Bonilla 39 y.o. male presenting for    Patinet is here to establish ac    Apneic episodes  Patient reports that has been has multiple apneic episodes throughout the night and has snoring. Was concerned pressures sleep apnea. Patient not been tested in the past.    Chronic nasal congestion   Patient has tried sudafed, vicks sinus, without any relief of symptoms. Patient reports that he was in the ED for low back pain   Patient was in the emergency department couple days ago after having back pain with radiculopathy down the left leg. Patient reports he feels a numbness tingling sensation that goes from his lower back all the way down to his toe. Patient x-rays there that were normal however had x-rays done last year which did show some osteoarthritis of the lumbar spine. Patient works as a , and is unable to sit more than 10 minutes without having pain. In the emergency department, patient was given oxycodone, naproxen, muscle relaxant, lidocaine with some relief of symptoms. Patient reports that the medication helped however was only temporary. Patient reports that the naproxen did not help at all. 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 Erectile dysfunction   Unable to have morning erections   Or keep an erection   Denies any fever, chills, nausea, vomiting chest pain, shortness of breath, changes in urination or changes in stools. Patient not been on any medication in the past      GERD   Was on Pepcid in the past   Did not help   Pasta sauces make the symptoms worse   Emogene Spillers chicken makes it worse   Would like medication to help     Asthma   Mild   Last attack was years   Worse with changes in weather / infection   Stable.   No concerns. Vaping with nicotine   Trying to quit on his own   Has decreased it   Did smoke in the past and transitioned to the vaping. Current Outpatient Medications   Medication Sig Dispense Refill    oxyCODONE (ROXICODONE) 5 MG immediate release tablet TAKE 1 TABLET BY MOUTH EVERY 8 HOURS AS NEEDED FOR PAIN      LIDOCAINE PAIN RELIEF 4 % external patch       naproxen (NAPROSYN) 500 MG tablet Take 1 tablet by mouth 2 times daily (with meals) 30 tablet 0    cyclobenzaprine (FLEXERIL) 10 MG tablet Take 1 tablet by mouth nightly as needed for Muscle spasms 10 tablet 0     Current Facility-Administered Medications   Medication Dose Route Frequency Provider Last Rate Last Admin    dexamethasone (PF) (DECADRON) injection 10 mg  10 mg Other [facility], DO            ROS  CONSTITUTIONAL: The patient denies fevers, chills, sweats and body ache. HEENT: Denies headache, blurry vision, eye pain, tinnitus, vertigo,  sore throat, neck or thyroid masses. RESPIRATORY: Denies cough, sputum, hemoptysis. CARDIAC: Denies chest pain, pressure, palpitations, Denies lower extremity edema. GASTROINTESTINAL: Denies abdominal pain, constipation, diarrhea, bleeding in the stools, admits to acid reflux. GENITOURINARY: admits to erectile dysfunction. NEUROLOGIC: Denies headaches, dizziness, syncope, weakness  MUSCULOSKELETAL: admits to chronic back pain. ENDOCRINOLOGY: Denies heat or cold intolerance. HEMATOLOGY: Denies easy bleeding or blood transfusion,anemia  DERMATOLOGY: rash in the groin area and the intergluteal folds. PSYCHIATRY: Denies depression, agitation or anxiety.     Past Medical History:   Diagnosis Date    Arthritis     Asthma     Chronic back pain     HA (headache)     Pain and swelling of lower leg 11/16/2021    Varicose veins of right lower extremity with pain 11/16/2021        Past Surgical History:   Procedure Laterality Date    APPENDECTOMY          Family History Problem Relation Age of Onset    Hypothyroidism Mother     Arthritis Mother     Hypertension Mother     Coronary Art Dis Paternal Grandmother     Heart Attack Paternal Grandmother     Hypertension Father         Social History     Socioeconomic History    Marital status:      Spouse name: Not on file    Number of children: Not on file    Years of education: Not on file    Highest education level: Not on file   Occupational History    Not on file   Tobacco Use    Smoking status: Current Every Day Smoker     Packs/day: 0.00     Types: Cigarettes    Smokeless tobacco: Never Used    Tobacco comment: Quit 1yr ago   Vaping Use    Vaping Use: Every day    Substances: Nicotine    Devices: Pre-filled pod   Substance and Sexual Activity    Alcohol use: Yes     Comment: socially    Drug use: Never    Sexual activity: Yes     Partners: Female   Other Topics Concern    Not on file   Social History Narrative    Work at the [employer]     Has 2 children     Has 4 siblings. Not in close contact     Born in [place]. Moved to [place]    Hobbies: none. Social Determinants of Health     Financial Resource Strain: Low Risk     Difficulty of Paying Living Expenses: Not hard at all   Food Insecurity: No Food Insecurity    Worried About Running Out of Food in the Last Year: Never true    [place] of Food in the Last Year: Never true   Transportation Needs:     Lack of Transportation (Medical): Not on file    Lack of Transportation (Non-Medical):  Not on file   Physical Activity:     Days of Exercise per Week: Not on file    Minutes of Exercise per Session: Not on file   Stress:     Feeling of Stress : Not on file   Social Connections:     Frequency of Communication with Friends and Family: Not on file    Frequency of Social Gatherings with Friends and Family: Not on file    Attends [employer] Services: Not on file    Active Member of Clubs or Organizations: Not on file    Attends [employer] or Organization Meetings: Not on file    Marital Status: Not on file   Intimate Partner Violence:     Fear of Current or Ex-Partner: Not on file    Emotionally Abused: Not on file    Physically Abused: Not on file    Sexually Abused: Not on file   Housing Stability:     Unable to Pay for Housing in the Last Year: Not on file    Number of [place] in the Last Year: Not on file    Unstable Housing in the Last Year: Not on file        /78   Pulse 70   Temp 98.2 °F (36.8 °C)   Resp 12   Ht 5' 9\" (1.753 m)   Wt 262 lb (118.8 kg)   SpO2 97%   BMI 38.69 kg/m²        Physical Exam:    General appearance - alert, well appearing, and in no distress  Mental Status - alert, oriented to person, place, and time  Eyes - pupils equal and reactive, extraocular eye movements intact   Ears - bilateral TM's and external ear canals normal. Cerumen impaction in the right ear. Nose - normal and patent, no erythema, discharge or polyps   Sinuses - Normal paranasal sinuses without tenderness   Throat - mucous membranes moist, pharynx normal without lesions   Neck - supple, no significant adenopathy   Thyroid - thyroid is normal in size without nodules or tenderness    Chest - clear to auscultation, no wheezes, rales or rhonchi, symmetric air entry   Heart - normal rate, regular rhythm, normal S1, S2, no murmurs, rubs, clicks or gallops  Abdomen - soft, nontender, nondistended, no masses or organomegaly   Back exam - full range of motion, no tenderness, palpable spasm or pain on motion   Neurological - alert, oriented, normal speech, no focal findings or movement disorder noted   Musculoskeletal - tenderness to palpation in the lumbar spine and tenderness to palpation in the paraspinal muscles. Extremities - peripheral pulses normal, no pedal edema, no clubbing or cyanosis   Skin - erythematous papular rash at the base of the scrotum and in the intergluteal folds. There is some tenderness to palpation.   Palpable varicose veins in the lower extremities more in the right than the left. Labs   No results found for: TSHREFLEX  No results found for: TSH      A/P: Angela Shell 39 y.o. male presenting for     1. Lumbar radiculopathy  And sciatica. We will do a trial of gabapentin 300 mg twice a day. Advised patient to try in the evening first.  Naproxen does not help him. Will change to meloxicam to see if this helps better. Also place in a pain management referral.  - gabapentin (NEURONTIN) 300 MG capsule; Take 1 capsule by mouth in the morning and at bedtime for 30 days. Dispense: 60 capsule; Refill: 0  - Galen Chavez MD, [facility] [place]  - meloxicam (MOBIC) 7.5 MG tablet; Take 1 tablet by mouth daily  Dispense: 30 tablet; Refill: 0    2. Localized osteoarthritis of lumbar spine    - gabapentin (NEURONTIN) 300 MG capsule; Take 1 capsule by mouth in the morning and at bedtime for 30 days. Dispense: 60 capsule; Refill: 0  - Galen Chavez MD, [facility] [place]  - meloxicam (MOBIC) 7.5 MG tablet; Take 1 tablet by mouth daily  Dispense: 30 tablet; Refill: 0    3. Nasal congestion    - oxymetazoline (12 HOUR NASAL SPRAY) 0.05 % nasal spray; 2 sprays by Nasal route 2 times daily Intranasal: Instill 2 to 3 sprays into each nostril twice daily for up to 3 days (maximum dose: 2 doses/24 hours). Dispense: 12 mL; Refill: 11    4. Erectile dysfunction, unspecified erectile dysfunction type  We will do a trial of Cialis to see if it helps. - tadalafil (CIALIS) 10 MG tablet; 10 mg at least 30 minutes prior to anticipated sexual activity as one single dose and not more than once daily. Dispense: 10 tablet; Refill: 1    5. Snoring  Admits to snoring and apneic episodes. We will need a sleep study to rule out obstructive sleep apnea. - Baseline Diagnostic Sleep Study; Future    6. Apneic episode    - Baseline Diagnostic Sleep Study;  Future            Please note, this report has been partially produced using speech recognition software  and may cause  and /or contain errors related to that system including grammar, punctuation and spelling as well as words and phrases that may seem inappropriate. If there are questions or concerns please feel free to contact me to clarify.

## 2022-05-09 ENCOUNTER — INITIAL CONSULT (OUTPATIENT)
Dept: PAIN MANAGEMENT | Age: 37
End: 2022-05-09
Payer: MEDICAID

## 2022-05-09 VITALS — WEIGHT: 260 LBS | HEIGHT: 69 IN | TEMPERATURE: 97.2 F | BODY MASS INDEX: 38.51 KG/M2

## 2022-05-09 DIAGNOSIS — M54.16 LUMBAR RADICULOPATHY: Primary | ICD-10-CM

## 2022-05-09 DIAGNOSIS — M79.605 LEFT LEG PAIN: ICD-10-CM

## 2022-05-09 PROCEDURE — G8427 DOCREV CUR MEDS BY ELIG CLIN: HCPCS | Performed by: PHYSICAL MEDICINE & REHABILITATION

## 2022-05-09 PROCEDURE — 99204 OFFICE O/P NEW MOD 45 MIN: CPT | Performed by: PHYSICAL MEDICINE & REHABILITATION

## 2022-05-09 PROCEDURE — G8417 CALC BMI ABV UP PARAM F/U: HCPCS | Performed by: PHYSICAL MEDICINE & REHABILITATION

## 2022-05-09 PROCEDURE — 4004F PT TOBACCO SCREEN RCVD TLK: CPT | Performed by: PHYSICAL MEDICINE & REHABILITATION

## 2022-05-09 RX ORDER — GABAPENTIN 600 MG/1
600 TABLET ORAL NIGHTLY
Qty: 30 TABLET | Refills: 0 | Status: SHIPPED | OUTPATIENT
Start: 2022-05-09 | End: 2022-06-09 | Stop reason: SDUPTHER

## 2022-05-09 ASSESSMENT — ENCOUNTER SYMPTOMS
CONSTIPATION: 0
SHORTNESS OF BREATH: 0
BACK PAIN: 1
NAUSEA: 0
DIARRHEA: 0

## 2022-05-09 NOTE — PROGRESS NOTES
Azul Smith  (1985)    5/9/2022    Subjective:     Azul Smith is 39 y.o. male who complains today of:    Chief Complaint   Patient presents with    Back Pain       Azul Smith is a 39 y.o. male who presents for evaluation by request of Dr. Jo Real  for lumbar radiculopathy and osteoarthritis of spine. He has struggled with pain for over 4 months. He denies any immediately-preceding traumatic or inciting events. He has been previously evaluated by Dr Michelle Horner whose records are reviewed below. He describes pain located in the left low back and down the left leg. Right low back and leg are fine. Pain is a constant ache and is currently a 7/10 and gets up to a 10/10 at its worst and goes down to a 6/10 at its best. Pain is worse with any position, sitting standing, anything. Pain is better with laying on his right side. Pain is located 0% on the right and 100% on the left. Pain is located 80% in the back and 20% in the legs. He denies any numbness, tingling, weakness, bowel or bladder dysfunction, saddle anesthesia, falls, history of cancer, unexplained weight loss, persistent night pain and sweats, fever, IV drug abuse, immunocompromise, chronic prednisone or antibiotic use, or any other red flag symptoms. Mood is good, denies any suicidal or homicidal ideation. Sleep is poor, awakes fatigued.     He has tried:  Home exercise program with minimal relief    Diagnostic testing previously performed includes XRs    Medications tried include:  Acetaminophen with minimal relief for over 3 months  Ibuprofen with minimal relief for over 3 months  Cyclobenzaprine 10 mg qHS  Gabapentin 300 mg qHS  Meloxicam 7.5 mg raising blood pressure  Lidocaine patch min relief  Naproxen 500 mg BID min relief  Tramadol 50 mg worked helped  [employer] 5/325 mg helped    Allergies, Medications, Past Medical History, Family History, Social History, Work History, and Review of Systems reviewed below    No Seizures, Epilepsy or Brain Surgery     Spends his time: works as , stopped work due to severe pain, on short term disability. Last worked 4/22/22, not been working due to pain. He used to enjoy walking, going to [employer], work around [employer]. Worked on his car. Used to play basketball with children. Allergies:  Aspirin    Past Medical History:   Diagnosis Date    Arthritis     Asthma     Chronic back pain     HA (headache)     Pain and swelling of lower leg 11/16/2021    Varicose veins of right lower extremity with pain 11/16/2021     Past Surgical History:   Procedure Laterality Date    APPENDECTOMY       Family History   Problem Relation Age of Onset    Hypothyroidism Mother     Arthritis Mother     Hypertension Mother     Coronary Art Dis Paternal Grandmother     Heart Attack Paternal Grandmother     Hypertension Father      Social History     Socioeconomic History    Marital status:      Spouse name: Not on file    Number of children: Not on file    Years of education: Not on file    Highest education level: Not on file   Occupational History    Not on file   Tobacco Use    Smoking status: Current Every Day Smoker     Packs/day: 0.00     Types: Cigarettes    Smokeless tobacco: Never Used    Tobacco comment: Quit 1yr ago   Vaping Use    Vaping Use: Every day    Substances: Nicotine    Devices: Pre-filled pod   Substance and Sexual Activity    Alcohol use: Yes     Comment: socially    Drug use: Never    Sexual activity: Yes     Partners: Female   Other Topics Concern    Not on file   Social History Narrative    Work at the [employer]     Has 2 children     Has 4 siblings. Not in close contact     Born in [facility]. Moved to [place] 2018    Hobbies: none.        Social Determinants of Health     Financial Resource Strain: Low Risk     Difficulty of Paying Living Expenses: Not hard at all   Food Insecurity: No Food Insecurity    Worried About Running Out of Food in the Last Year: Never true    Ran Out of Food in the Last Year: Never true   Transportation Needs:     Lack of Transportation (Medical): Not on file    Lack of Transportation (Non-Medical): Not on file   Physical Activity:     Days of Exercise per Week: Not on file    Minutes of Exercise per Session: Not on file   Stress:     Feeling of Stress : Not on file   Social Connections:     Frequency of Communication with Friends and Family: Not on file    Frequency of Social Gatherings with Friends and Family: Not on file    Attends [employer] Services: Not on file    Active Member of [address] [place] [address] [employer] or Organizations: Not on file    Attends Club or Organization Meetings: Not on file    Marital Status: Not on file   Intimate Partner Violence:     Fear of Current or Ex-Partner: Not on file    Emotionally Abused: Not on file    Physically Abused: Not on file    Sexually Abused: Not on file   Housing Stability:     Unable to Pay for Housing in the Last Year: Not on file    Number of [place] in the Last Year: Not on file    Unstable Housing in the Last Year: Not on file       Current Outpatient Medications on File Prior to Visit   Medication Sig Dispense Refill    LIDOCAINE PAIN RELIEF 4 % external patch       oxymetazoline (12 HOUR NASAL SPRAY) 0.05 % nasal spray 2 sprays by Nasal route 2 times daily Intranasal: Instill 2 to 3 sprays into each nostril twice daily for up to 3 days (maximum dose: 2 doses/24 hours). 12 mL 11    tadalafil (CIALIS) 10 MG tablet 10 mg at least 30 minutes prior to anticipated sexual activity as one single dose and not more than once daily.  10 tablet 1    meloxicam (MOBIC) 7.5 MG tablet Take 1 tablet by mouth daily 30 tablet 0    cyclobenzaprine (FLEXERIL) 10 MG tablet Take 1 tablet by mouth nightly as needed for Muscle spasms 10 tablet 0     Current Facility-Administered Medications on File Prior to Visit   Medication Dose Route Frequency Provider Last Rate Last Admin    dexamethasone (PF) (DECADRON) injection 10 mg  10 mg Other Once Ata Tala[place]a,            Review of Systems   Constitutional: Negative for fever. HENT: Negative for hearing loss. Respiratory: Negative for shortness of breath. Gastrointestinal: Negative for constipation, diarrhea and nausea. Genitourinary: Negative for difficulty urinating. Musculoskeletal: Positive for back pain. Negative for neck pain. Skin: Negative for rash. Neurological: Negative for headaches. Hematological: Does not bruise/bleed easily. Psychiatric/Behavioral: Negative for sleep disturbance. Objective:     Vitals:  Temp 97.2 °F (36.2 °C)   Ht 5' 9\" (1.753 m)   Wt 260 lb (117.9 kg)   BMI 38.40 kg/m² Pain Score:   7      Exam performed under Coronavirus precautions  Gen: No acute distress  Neck: Grossly symmetric without any significant thyromegaly or masses appreciated. Eyes: No scleral icterus or lid lag appreciated bilaterally. Irises without gross defects bilaterally. HEENT: Hearing grossly intact bilaterally. Normocephalic, external ears and visible portions of nose and mouth atraumatic. Lymph: No gross neck or axillary lymphadenopathy  Cardio: No significant lower extremity edema, pulses intact without significant digit ischemia. Abd: No gross masses or large hernias appreciated. Skin: Visualized skin without any dermatomal rashes or sores. Palpation free of any tightening or subcutaneous nodules. MSK: Gait is antalgic. No significant upper limb digit ischemia appreciated. Psych: Pleasant and cooperative with the history and exam. Mood and Affect normal. Appropriately dressed with good eye contact. Judgement and insight normal. Recent and remote memory intact. Alert and Oriented x3. Neuro: Cranial nerves II-XII grossly intact. No significant pathologic reflexes appreciated. Rises from a seated to standing position with mild difficulty. Gait is antalgic. No assistive devices used.     Heel and toe walk intact. Lumbar flexion to 50 degrees, extension to 15 degrees. Limited lumbar spine range of motion. Rotation and extension reproduces axial low back pain. Other facet provocative maneuvers are positive. No gross step offs noted. Tenderness to palpation over the mid to low lumbar spinous processes and left lumbar paraspinals from L2 down to the sacrum. No tenderness over bilateral PSIS. No tenderness over bilateral greater trochanters. No tenderness over bilateral deep gluteal regions. Sensation grossly intact in both legs except for left L5 paresthesias  Reflexes and strength functional for ambulation, no abnormal reflexes appreciated on exam today  Strength greater than 3/5 bilateral legs  Straight leg raise positive on exam today          Outside record review:  Review of the original consultation request reveals no specific diagnostic requests or clinical concerns aside from lumbar radiculopathy and osteoarthritis that require particular attention. There are no suggested, requested, or specified tests to be ordered or any prior diagnostics performed that require follow-up or further investigation. Dr Scott Ryees 5/3/22: back pain with radiculopathy down left leg. xr last year shows osteoarthritis of spine. Works . Unable to sit more than 10 minutes without pain. Given oxycodone naproxen muscle relaxant lidocaine in ER. 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 Lumbar radiculopathy and sciatica. Start gabapentin, naproxen, meloxicam, pain mgmt referral.    XR LS Spine 4/29/22: disc spaces within normal limits. No fracture. SI joints symmetric.        Component      Latest Ref Rng & Units 5/21/2019           2:30 AM   Platelet Count      545 - 400 K/uL 115 (L)     Component      Latest Ref Rng & Units 9/27/2021           2:53 PM   Creatinine      0.70 - 1.20 mg/dL 0.86     Family history of alcohol abuse 0  Family history of illegal drug abuse 0  Family history of prescription drug abuse 0    Personal history of alcohol abuse/DUI 0  Personal history of illegal drug abuse 0  Personal history of prescription drug abuse 0    Age between 14-38 +1    History of preadolescent sexual abuse 0    Personal history of obsessive compulsive disorder 0  Personal history of attention deficit disorder 0  Personal history of bipolar disorder 0  Personal history of schizophrenia 0  Personal history of depression 0    Score = 1, low risk  Assessment:      Diagnosis Orders   1. Lumbar radiculopathy  [facility]    MRI LUMBAR SPINE WO CONTRAST    diclofenac (VOLTAREN) 50 MG EC tablet    gabapentin (NEURONTIN) 600 MG tablet    AL NJX AA&/STRD TFRML EPI LUMBAR/SACRAL 1 LEVEL    Urine Drug Screen   2. Left leg pain  [facility]    MRI LUMBAR SPINE WO CONTRAST       Plan:     Periodic Controlled Substance Monitoring: Assessed functional status. Kalpana Poe MD)    Orders Placed This Encounter   Medications    diclofenac (VOLTAREN) 50 MG EC tablet     Sig: Take 1 tablet by mouth 2 times daily for 7 days Take with food, avoid other NSAIDs (Ibuprofen) and steroids     Dispense:  14 tablet     Refill:  0    gabapentin (NEURONTIN) 600 MG tablet     Sig: Take 1 tablet by mouth at bedtime for 30 days. Dispense:  30 tablet     Refill:  0       Orders Placed This Encounter   Procedures    MRI LUMBAR SPINE WO CONTRAST     Standing Status:   Future     Standing Expiration Date:   8/7/2022     Order Specific Question:   Reason for exam:     Answer:   eval left L5/S1 disc herniation    Urine Drug Screen    [facility]     Referral Priority:   Routine     Referral Type:   Eval and Treat     Referral Reason:   Specialty Services Required     Requested Specialty:   Physical Therapy     Number of Visits Requested:   1    [facility]     Standing Status:   Future     Standing Expiration Date:   5/9/2023     Order Specific Question:   Which body part? Detail Level: Detailed Answer:   Bilateral lower extremities    SC NJX AA&/STRD TFRML EPI LUMBAR/SACRAL 1 LEVEL     Left Lumbar L5/S1 transforaminal epidural steroid injection under XR with Dr Margarita Castillo. No anticoagulation, no antibiotics, no diabetes mellitus, no osteoporosis, no bleeding or platelet dysfunction, IV Dye okay, allergies reviewed, 15 minute procedure. Prone position     Standing Status:   Future     Standing Expiration Date:   8/7/2022       -PT for lumbar radiculopathy  -Please follow up with primary care team regarding low platelet counts. -Reviewed XR LS Spine above, all questions answered  -[facility] B LE eval left leg pain  -Given that he has been unable to work due to the severe pain and has had min relief with conservative treatment, recommend  -MRI LS Spine without contrast eval left L5/S1 disc herniation   -Lidocaine 4% ointment topical BID prn #1 tube one refill start 5/9/2022   -D/c Meloxicam due to blood pressure elevation  -Trial Diclofenac 50 mg by mouth BID #14 no refills start 5/9/2022. One week trial, watch blood pressure, Avoid all other NSAIDs and steroids. Watch for any allergy, stop immediately if it occurs and call 911 or go to ER for any concerning symptoms  -Increase Gabapentin 300 mg to 600 mg qHS #30 no refills start 5/9/2022.   -Consider Tizanidine   -Consideration for opioids may be given. Consideration for Tramadol may be given for short term. UDS today just in case.  -Left Lumbar L5/S1 transforaminal epidural steroid injection under XR with Dr Margarita Castillo. No anticoagulation, no antibiotics, no diabetes mellitus, no osteoporosis, no bleeding or platelet dysfunction, IV Dye okay, allergies reviewed, 15 minute procedure. Prone position Consider 5 mg dexamethasone, subpedicular approach  -Okay for FMLA paperwork and short term disability      Controlled Substance Monitoring:    Acute and Chronic Pain Monitoring:   RX Monitoring 5/9/2022   Periodic Controlled Substance Monitoring Assessed functional status. Quality 111:Pneumonia Vaccination Status For Older Adults: Pneumococcal Vaccination Previously Received Discussed the risks, side effects, and symptoms that would warrant urgent or emergent physician evaluation of all medications prescribed today. The patient was advised that NSAID-type medications have three important potential side effects: gastrointestinal irritation including hemorrhage, myocardial injury including possible infarction, and kidney injury. He was asked to take the medication with food, avoid any other NSAIDs or steroids, and discontinue if he develops any concerning symptoms. He should immediately stop the medication and proceed to the emergency department for chest pain, dark urine or difficulty with producing urine, vomiting, abdominal pain or black/tarry stools. The patient expresses understanding of these issues and all questions were answered. Discussed the risks of the above recommended procedures including but not limited to bleeding, infection, worsened pain, damage to surrounding structures, side effects, toxicity, allergic reactions to medications used, immune and stress-response dysfunction, fat necrosis, skin pigmentation changes, blood sugar elevation, headache, vision changes, need for surgery, as well as catastrophic injury such as vision loss, paralysis, stroke, spinal cord and/or plexus infarction or injury, intrathecal injection, spinal cord puncture, arachnoiditis, discitis, bowel or bladder incontinence, ventilator dependence, loss of use of the arms and/or legs, and death. Discussed off-label use of corticosteroids and how the Food and Drug Administration (FDA) has not approved corticosteroids for epidural use. Discussed the risks, benefits, alternative procedures, and alternatives to the procedure including no procedure at all. Discussed that we cannot undo any permanent neurologic damage or change the course of any underlying disease.  The patient appears to be a good candidate for the above recommended procedures, but no guarantees expressed or implied are given Quality 110: Preventive Care And Screening: Influenza Immunization: Influenza Immunization Administered during Influenza season regarding the outcome of any procedure. After thorough discussion, patient expressed understanding and willingness to proceed. Provided education and counseling regarding the diagnosis, prognosis, and treatment options. All questions were answered. Encouraged him to follow-up with his primary care physician and/or specialists as required for his overall health and management of his comorbidities as well as any new positive symptoms mentioned in review of systems above. Care was provided within the definitions and limitations of our specialty practice. Encouraged lifestyle interventions including healthy habits, lifestyle changes, regular aerobic exercise and appropriate weight maintenance as advised by their primary care physician or cardiovascular health provider. Discussed well care and disease prevention/maintenance. All recommendations for therapy are provided to improve function with activities of daily living, decrease pain, and help develop an exercise program. All recommendations for medications are meant to help decrease pain, improve function with activities of daily living, maintain compliance with home exercise program, and improve quality of life. All recommendations for therapeutic injections are meant to help decrease pain, improve function with activities of daily living, maintain compliance with home exercise program, improve quality of life, and decrease reliance upon oral medications. Encouraged compliance with his home exercise program. Recommended compliance with physical therapy program as outlined above. Discussed the elevated risks of excessive sedation while on pain medications. Advised him against driving or operating heavy machinery or performing any activities where he may harm himself or others while on pain medications. Particular caution was emphasized especially during dose adjustments and medication changes.  Discussed the elevated risks of respiratory depression and death while on opioid medications, especially when combined with other sedative substances. Discussed the risks of temporary disability, permanent disability, morbidity, and mortality with poorly-managed or undiagnosed medical conditions and comorbidities. Emphasized the importance of timely medical evaluation and treatment as previously recommended by us or other medical professionals. Risks of not pursing these recommendations were emphasized. The patient was offered a treatment at our facility. The physician and patient have discussed in detail the risk of exposure to and/or potential harm posed by the COVID-19 virus with having office visits and procedures at this time versus the risk of delaying the visits and procedures. It is not possible to know either the risk of delaying the visits or procedure or chance of getting an infection with perfect accuracy, but a joint decision was made between the patient and the physician to proceed at this time with the scheduled visits and procedures. Advised him that any lab testing, imaging, or other diagnostic test results are best discussed in person in the office so that we can provide a clear explanation of their significance and best treatment based upon these results. It is his responsibility to make and keep a follow up appointment to discuss these test results in person to discuss the significance of the findings and appropriate follow-up steps. He expressed complete understanding and agreement with the entire plan as outlined above. Portions of this note may have been typed, auto-populated, dictated or transcribed by voice recognition resulting in errors, omissions, or close substitutions which may be missed despite careful proofreading. Please contact the author for any questions or concerns. Thank you Dr. Nikos Roy for the opportunity to participate in this patient's care. If you have any questions or concerns, please do not hesitate to contact us. Follow up:  Return in about 1 month (around 6/9/2022) for reassessment of pain and symptoms, [facility] Internal, P.T. Internal Ref.     Calista France MD

## 2022-05-10 ENCOUNTER — TELEPHONE (OUTPATIENT)
Dept: PAIN MANAGEMENT | Age: 37
End: 2022-05-10

## 2022-05-10 ENCOUNTER — OFFICE VISIT (OUTPATIENT)
Dept: FAMILY MEDICINE CLINIC | Age: 37
End: 2022-05-10
Payer: MEDICAID

## 2022-05-10 VITALS
HEIGHT: 69 IN | BODY MASS INDEX: 37.84 KG/M2 | OXYGEN SATURATION: 98 % | DIASTOLIC BLOOD PRESSURE: 78 MMHG | SYSTOLIC BLOOD PRESSURE: 120 MMHG | WEIGHT: 255.5 LBS | TEMPERATURE: 97.3 F | HEART RATE: 93 BPM

## 2022-05-10 DIAGNOSIS — M47.816 LOCALIZED OSTEOARTHRITIS OF LUMBAR SPINE: ICD-10-CM

## 2022-05-10 DIAGNOSIS — M54.16 LUMBAR RADICULOPATHY: Primary | ICD-10-CM

## 2022-05-10 DIAGNOSIS — R09.81 NASAL CONGESTION: ICD-10-CM

## 2022-05-10 PROCEDURE — G8427 DOCREV CUR MEDS BY ELIG CLIN: HCPCS | Performed by: FAMILY MEDICINE

## 2022-05-10 PROCEDURE — G8417 CALC BMI ABV UP PARAM F/U: HCPCS | Performed by: FAMILY MEDICINE

## 2022-05-10 PROCEDURE — 4004F PT TOBACCO SCREEN RCVD TLK: CPT | Performed by: FAMILY MEDICINE

## 2022-05-10 PROCEDURE — 99214 OFFICE O/P EST MOD 30 MIN: CPT | Performed by: FAMILY MEDICINE

## 2022-05-10 RX ORDER — METHYLPREDNISOLONE 4 MG/1
TABLET ORAL
Qty: 1 KIT | Refills: 0 | Status: SHIPPED | OUTPATIENT
Start: 2022-05-10 | End: 2022-05-16

## 2022-05-10 NOTE — PROGRESS NOTES
Chief Complaint   Patient presents with    Follow-up    Nasal Congestion     1 week f/up, still congested & getting worse due to allergies.  Back Pain     1 week f/up, still the same. HPI: Elisa Baldwin 39 y.o. male presenting for        Patient reports that he was in the ED for low back pain   Patient was in the emergency department couple days ago after having back pain with radiculopathy down the left leg. Patient reports he feels a numbness tingling sensation that goes from his lower back all the way down to his toe. Patient x-rays there that were normal however had x-rays done last year which did show some osteoarthritis of the lumbar spine. Patient works as a , and is unable to sit more than 10 minutes without having pain. In the emergency department, patient was given oxycodone, naproxen, muscle relaxant, lidocaine with some relief of symptoms. Patient reports that the medication helped however was only temporary. Patient reports that the naproxen did not help at all. 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 F/u   Patient reports that the lower back pain has not improved. Is seeing pain management and saw that yesterday. Patient reports that the 300 mg of gabapentin do not help. It was recently increased to 600 mg. Patient denies any urine or stool incontinence. Stopped taking meloxicam due to it causing elevation in blood pressures. Patient denies any fevers, chills, nausea, vomiting or chest pain, shortness of breath, abdominal pain, urination, change stools. Current Outpatient Medications   Medication Sig Dispense Refill    diclofenac (VOLTAREN) 50 MG EC tablet Take 1 tablet by mouth 2 times daily for 7 days Take with food, avoid other NSAIDs (Ibuprofen) and steroids 14 tablet 0    gabapentin (NEURONTIN) 600 MG tablet Take 1 tablet by mouth at bedtime for 30 days.  30 tablet 0    LIDOCAINE PAIN RELIEF 4 % external patch       oxymetazoline (12 HOUR NASAL SPRAY) 0.05 % nasal spray 2 sprays by Nasal route 2 times daily Intranasal: Instill 2 to 3 sprays into each nostril twice daily for up to 3 days (maximum dose: 2 doses/24 hours). 12 mL 11    tadalafil (CIALIS) 10 MG tablet 10 mg at least 30 minutes prior to anticipated sexual activity as one single dose and not more than once daily. 10 tablet 1    meloxicam (MOBIC) 7.5 MG tablet Take 1 tablet by mouth daily 30 tablet 0     Current Facility-Administered Medications   Medication Dose Route Frequency Provider Last Rate Last Admin    dexamethasone (PF) (DECADRON) injection 10 mg  10 mg Other Once [employer], DO            ROS  CONSTITUTIONAL: The patient denies fevers, chills, sweats and body ache. HEENT: Denies headache, blurry vision, eye pain, tinnitus, vertigo,  sore throat, neck or thyroid masses. RESPIRATORY: Denies cough, sputum, hemoptysis. CARDIAC: Denies chest pain, pressure, palpitations, Denies lower extremity edema. GASTROINTESTINAL: Denies abdominal pain, constipation, diarrhea, bleeding in the stools, admits to acid reflux. GENITOURINARY: admits to erectile dysfunction. NEUROLOGIC: Denies headaches, dizziness, syncope, weakness  MUSCULOSKELETAL: admits to chronic back pain. ENDOCRINOLOGY: Denies heat or cold intolerance. HEMATOLOGY: Denies easy bleeding or blood transfusion,anemia  DERMATOLOGY: rash in the groin area and the intergluteal folds. PSYCHIATRY: Denies depression, agitation or anxiety.     Past Medical History:   Diagnosis Date    Arthritis     Asthma     Chronic back pain     HA (headache)     Pain and swelling of lower leg 11/16/2021    Varicose veins of right lower extremity with pain 11/16/2021        Past Surgical History:   Procedure Laterality Date    APPENDECTOMY          Family History   Problem Relation Age of Onset    Hypothyroidism Mother     Arthritis Mother     Hypertension Mother     Coronary Art Dis Paternal Grandmother     Heart Attack Paternal Grandmother     Hypertension Father         Social History     Socioeconomic History    Marital status:      Spouse name: Not on file    Number of children: Not on file    Years of education: Not on file    Highest education level: Not on file   Occupational History    Not on file   Tobacco Use    Smoking status: Current Every Day Smoker     Packs/day: 0.00     Types: Cigarettes    Smokeless tobacco: Never Used    Tobacco comment: Quit 1yr ago   Vaping Use    Vaping Use: Every day    Substances: Nicotine    Devices: Pre-filled pod   Substance and Sexual Activity    Alcohol use: Yes     Comment: socially    Drug use: Never    Sexual activity: Yes     Partners: Female   Other Topics Concern    Not on file   Social History Narrative    Work at the [employer]     Has 2 children     Has 4 siblings. Not in close contact     Born in [place]. Moved to [address] [place] [address] Dr 2018    Hoblobitoes: none. Social Determinants of Health     Financial Resource Strain: Low Risk     Difficulty of Paying Living Expenses: Not hard at all   Food Insecurity: No Food Insecurity    Worried About Running Out of Food in the Last Year: Never true    [place] of Food in the Last Year: Never true   Transportation Needs:     Lack of Transportation (Medical): Not on file    Lack of Transportation (Non-Medical):  Not on file   Physical Activity:     Days of Exercise per Week: Not on file    Minutes of Exercise per Session: Not on file   Stress:     Feeling of Stress : Not on file   Social Connections:     Frequency of Communication with Friends and Family: Not on file    Frequency of Social Gatherings with Friends and Family: Not on file    Attends Nondenominational Services: Not on file    Active Member of Clubs or Organizations: Not on file    Attends Club or Organization Meetings: Not on file    Marital Status: Not on file   Intimate Partner Violence:  Fear of Current or Ex-Partner: Not on file    Emotionally Abused: Not on file    Physically Abused: Not on file    Sexually Abused: Not on file   Housing Stability:     Unable to Pay for Housing in the Last Year: Not on file    Number of [place] in the Last Year: Not on file    Unstable Housing in the Last Year: Not on file        /78   Pulse 93   Temp 97.3 °F (36.3 °C) (Temporal)   Ht 5' 9\" (1.753 m)   Wt 255 lb 8 oz (115.9 kg)   SpO2 98%   BMI 37.73 kg/m²        Physical Exam:    General appearance - alert, well appearing, and in no distress  Mental Status - alert, oriented to person, place, and time  Eyes - pupils equal and reactive, extraocular eye movements intact   Ears - bilateral TM's and external ear canals normal. Cerumen impaction in the right ear. Nose - normal and patent, no erythema, discharge or polyps   Sinuses - Normal paranasal sinuses without tenderness   Throat - mucous membranes moist, pharynx normal without lesions   Neck - supple, no significant adenopathy   Thyroid - thyroid is normal in size without nodules or tenderness    Chest - clear to auscultation, no wheezes, rales or rhonchi, symmetric air entry   Heart - normal rate, regular rhythm, normal S1, S2, no murmurs, rubs, clicks or gallops  Abdomen - soft, nontender, nondistended, no masses or organomegaly   Back exam - full range of motion, no tenderness, palpable spasm or pain on motion   Neurological - alert, oriented, normal speech, no focal findings or movement disorder noted   Musculoskeletal - tenderness to palpation in the lumbar spine and tenderness to palpation in the paraspinal muscles. Extremities - peripheral pulses normal, no pedal edema, no clubbing or cyanosis   Skin - erythematous papular rash at the base of the scrotum and in the intergluteal folds. There is some tenderness to palpation. Palpable varicose veins in the lower extremities more in the right than the left.      Labs   No results found for: TSHREFLEX  No results found for: TSH      1. Lumbar radiculopathy  Will need further testing. Plans for an EMG on 6/9/22  Has a follow up apt with pain management on 6/10/22  - methylPREDNISolone (MEDROL DOSEPACK) 4 MG tablet; Take by mouth. Dispense: 1 kit; Refill: 0    2. Nasal congestion    - methylPREDNISolone (MEDROL DOSEPACK) 4 MG tablet; Take by mouth. Dispense: 1 kit; Refill: 0    3. Localized osteoarthritis of lumbar spine    - methylPREDNISolone (MEDROL DOSEPACK) 4 MG tablet; Take by mouth. Dispense: 1 kit; Refill: 0          Please note, this report has been partially produced using speech recognition software  and may cause  and /or contain errors related to that system including grammar, punctuation and spelling as well as words and phrases that may seem inappropriate. If there are questions or concerns please feel free to contact me to clarify.

## 2022-05-10 NOTE — TELEPHONE ENCOUNTER
ORDER PLACED:    Date: 5/9/22  Description: LEFT L5-S1 TRANSFORAMINAL  Order Number: [id number]  Ordering Provider: [facility]  Performing Provider: ZACH  CPT Codes: 25346  ICD10 Codes: M54.16

## 2022-05-10 NOTE — TELEPHONE ENCOUNTER
LEFT L5-S1 TRANSFORAMINAL    NO AUTH REQUIRED    OK to schedule procedure approved as above. Please note sides/levels approved and date range. (If applicable, sides/levels approved may differ from those ordered)    TO BE SCHEDULED WITH DR. Dominguez Parents

## 2022-05-11 ENCOUNTER — TELEPHONE (OUTPATIENT)
Dept: FAMILY MEDICINE CLINIC | Age: 37
End: 2022-05-11

## 2022-05-11 DIAGNOSIS — R06.02 SHORTNESS OF BREATH: Primary | ICD-10-CM

## 2022-05-11 RX ORDER — ALBUTEROL SULFATE 2.5 MG/3ML
2.5 SOLUTION RESPIRATORY (INHALATION) EVERY 6 HOURS PRN
Qty: 120 EACH | Refills: 3 | Status: SHIPPED | OUTPATIENT
Start: 2022-05-11 | End: 2022-08-12 | Stop reason: SDUPTHER

## 2022-05-11 RX ORDER — ALBUTEROL SULFATE 90 UG/1
2 AEROSOL, METERED RESPIRATORY (INHALATION) EVERY 6 HOURS PRN
Qty: 18 G | Refills: 3 | Status: SHIPPED | OUTPATIENT
Start: 2022-05-11

## 2022-05-11 NOTE — TELEPHONE ENCOUNTER
Pt would like to know if an albuterol Inhaler. States he needs one. There are no inhalers on his med list for me to put a refill request in. Needs a new nebulizer with solution also.   please advise

## 2022-05-19 ENCOUNTER — HOSPITAL ENCOUNTER (OUTPATIENT)
Dept: MRI IMAGING | Age: 37
Discharge: HOME | End: 2022-05-21
Payer: MEDICAID

## 2022-05-19 DIAGNOSIS — M54.16 LUMBAR RADICULOPATHY: ICD-10-CM

## 2022-05-19 DIAGNOSIS — M79.605 LEFT LEG PAIN: ICD-10-CM

## 2022-05-19 PROCEDURE — 72148 MRI LUMBAR SPINE W/O DYE: CPT

## 2022-05-20 ENCOUNTER — HOSPITAL ENCOUNTER (OUTPATIENT)
Dept: PHYSICAL THERAPY | Age: 37
Setting detail: THERAPIES SERIES
Discharge: HOME | End: 2022-05-20
Payer: MEDICAID

## 2022-05-20 PROCEDURE — 97161 PT EVAL LOW COMPLEX 20 MIN: CPT | Performed by: PHYSICAL THERAPIST

## 2022-05-20 ASSESSMENT — PAIN DESCRIPTION - DESCRIPTORS: DESCRIPTORS: SHARP;SHOOTING

## 2022-05-20 ASSESSMENT — PAIN DESCRIPTION - ONSET: ONSET: ON-GOING

## 2022-05-20 ASSESSMENT — PAIN DESCRIPTION - LOCATION: LOCATION: BACK

## 2022-05-20 ASSESSMENT — PAIN - FUNCTIONAL ASSESSMENT: PAIN_FUNCTIONAL_ASSESSMENT: PREVENTS OR INTERFERES SOME ACTIVE ACTIVITIES AND ADLS

## 2022-05-20 ASSESSMENT — PAIN SCALES - GENERAL: PAINLEVEL_OUTOF10: 9

## 2022-05-20 ASSESSMENT — PAIN DESCRIPTION - PAIN TYPE: TYPE: CHRONIC PAIN

## 2022-05-20 ASSESSMENT — PAIN DESCRIPTION - FREQUENCY: FREQUENCY: CONTINUOUS

## 2022-05-20 ASSESSMENT — PAIN DESCRIPTION - ORIENTATION: ORIENTATION: LEFT

## 2022-05-20 NOTE — PROGRESS NOTES
[address]  PHYSICAL THERAPY PLAN OF CARE   [address] [place], [address] [facility]         Ph: [phone]  Fax: [phone]    [] Certification  [] Recertification [x]  Plan of Care  [] Progress Note [] Discharge      Referring Provider: Marianela Baptiste MD      From:  Vidal Lao PT   Patient: Amado اللعي (88 y.o. male) : 1985 Date: 2022   Medical Diagnosis: Radiculopathy, lumbar region [M54.16]    Treatment Diagnosis: Painful left low back with left leg pain and decreased trunk motion    Plan of Care/Certification Expiration Date:     Progress Report Period from:  2022  to 2022    Visits to Date: 1 No Show:   Cancelled Appts:      OBJECTIVE:   Short Term Goals - Time Frame for Short term goals: 3-4 treatments    Goals Current/Discharge status  Status   Short term goal 1:  Increase in trunk active range of motion by 10-20 degrees all affected motions  Forward flexion 30, extension 20, bilateral sidebending 25, bilateral rotation WNL New   Short term goal 2: Pain decreased of the low back to 5/10 on the left  Pain reported 9/10 left low back, 1/10 left leg New   Short term goal 3: Left leg pain decreased to 1/10 or less  See above New                    Long Term Goals - Time Frame for Long term goals : 5-10 treatments  Goals Current/ Discharge status Status   Long term goal 1: Active range of motion of the trunk will be within normal limits See above New   Long term goal 2: Pain of the left lower back will be reported to be 2/10 or less See above New   Long term goal 3: No further pain reported of the left leg Reported 1/10 New   Long term goal 4: Patient will report that he is independent in his ADL's Currently needs help with bathing and dressing New   Long term goal 5: Patient will be able to tolerate regular work duty Currently off work New   Long term goal 6: Oswestry discharge score will be greater than 30/50 Initial score 30/50                 Body Structures, Functions, Activity Limitations Requiring Skilled Therapeutic Intervention: Decreased functional mobility ,Decreased ADL status,Decreased ROM,Decreased tolerance to work activity,Increased pain  Assessment: Problem List:  1. Decreased active range of motion of the trunk  2. Pain of the left low back and left posterior leg  3. Decreased ADL status 4. Unable to tolerate regular work duty 5. Oswestry initial score 30/50 indicating severe disability  Therapy Prognosis: Good      PT Education: Goals;PT Role;Plan of Care;Evaluative findings    PLAN: [x] Evaluate and Treat  Frequency/Duration:  Plan Frequency: 2 times weekly  Plan weeks: 5 weeks  Current Treatment Recommendations: ROM,Functional mobility training,ADL/Self-care training,Manual Therapy - Joint Manipulation,Manual Therapy - Soft Tissue Mobilization,Pain management,Return to work related activity,Home exercise program,Modalities     Precautions: Other position/activity restrictions: Off work, no lifting, no push/pulling, bending, no prolonged sitting                  Patient Status:[x] Continue/ Initiate plan of Care    [] Discharge PT. Recommend pt continue with HEP. [] Additional visits requested, Please re-certify for additional visits:    [] Hold         Signature: Electronically signed by Chyna Stacy PT on 5/20/22 at 2:29 PM EDT      If you have any questions or concerns, please don't hesitate to call. Thank you for your referral.    I have reviewed this plan of care and certify a need for medically necessary rehabilitation services.     Physician Signature:__________________________________________________________  Date:  Please sign and return

## 2022-05-20 NOTE — PROGRESS NOTES
[facility]  PHYSICAL THERAPY EVALUATION      Physical Therapy: Initial Evaluation    Patient: Cristel Martínez (6 y.o.     male)   Examination Date:   Plan of Care Certification Period: 2022 to    Progress Note Counter: 1   :  1985 ;    Confirmed: Yes MRN: [id number]  CSN: [id number]   Insurance: Payor: [employer] / Plan: [employer] / Product Type: *No Product type* /   Insurance ID: [id number] - (Medicaid Managed) Secondary Insurance (if applicable):    Referring Physician: Cory Tejada MD     PCP: Cathi Milian MD Visits to Date/Visits Approved: 1 /      No Show/Cancelled Appts:   /       Medical Diagnosis: Radiculopathy, lumbar region [M54.16]    Treatment Diagnosis: Painful left low back with left leg pain and decreased trunk motion     PERTINENT MEDICAL HISTORY   Patient Assessed for Rehabilitation Services: Yes  Self reported health status[de-identified] Good    Medical History:     Past Medical History:   Diagnosis Date    Arthritis     Asthma     Chronic back pain     HA (headache)     Lumbar radiculopathy     Pain and swelling of lower leg 2021    Varicose veins of right lower extremity with pain 2021     Surgical History:   Past Surgical History:   Procedure Laterality Date    APPENDECTOMY         Medications:   Current Outpatient Medications:     albuterol sulfate HFA (PROVENTIL HFA) 108 (90 Base) MCG/ACT inhaler, Inhale 2 puffs into the lungs every 6 hours as needed for Wheezing Cover what is under insurance., Disp: 18 g, Rfl: 3    albuterol (PROVENTIL) (2.5 MG/3ML) 0.083% nebulizer solution, Take 3 mLs by nebulization every 6 hours as needed for Wheezing Cover what is under insurance., Disp: 120 each, Rfl: 3    diclofenac (VOLTAREN) 50 MG EC tablet, Take 1 tablet by mouth 2 times daily for 7 days Take with food, avoid other NSAIDs (Ibuprofen) and steroids, Disp: 14 tablet, Rfl: 0   gabapentin (NEURONTIN) 600 MG tablet, Take 1 tablet by mouth at bedtime for 30 days. , Disp: 30 tablet, Rfl: 0    LIDOCAINE PAIN RELIEF 4 % external patch, , Disp: , Rfl:     oxymetazoline (12 HOUR NASAL SPRAY) 0.05 % nasal spray, 2 sprays by Nasal route 2 times daily Intranasal: Instill 2 to 3 sprays into each nostril twice daily for up to 3 days (maximum dose: 2 doses/24 hours). , Disp: 12 mL, Rfl: 11    tadalafil (CIALIS) 10 MG tablet, 10 mg at least 30 minutes prior to anticipated sexual activity as one single dose and not more than once daily. , Disp: 10 tablet, Rfl: 1    Current Facility-Administered Medications:     dexamethasone (PF) (DECADRON) injection 10 mg, 10 mg, Other, Once, Zenos Rocklake Pen, DO  Allergies: Aspirin      SUBJECTIVE EXAMINATION     History obtained from[de-identified] Patient,Chart Review,      Family/Caregiver Present: No    Subjective History: Onset Date: 05/20/22  Subjective: Patient reports that he has had back pain for a few months. It has recently gotten worse and he has been off work. He is scheduled for an EMG on June 6th, and back injections on June 2, 2022  Additional Pertinent Hx (if applicable): Asthma, meningitis   Prior diagnostic testing[de-identified] MRI,X-ray  Any changes to allergies, medications, or have you had any medical procedures/ER visits since your last visit?: No  Comment: He has had a recent MRI which was negative for a herniated disc  Comments: Patient sits on mostly his right buttock due to left low back pain.       Learning/Language: Learning  Does the patient/guardian have any barriers to learning?: No barriers  Will there be a co-learner?: No  What is the preferred language of the patient/guardian?: English  How does the patient/guardian prefer to learn new concepts?: Reading,Demonstration     Pain Screening    Pain Screening  Patient Currently in Pain: Yes  Pain Assessment: 0-10  Pain Level: 9  Best Pain Level: 0  Worst Pain Level: 9  Patient's Stated Pain Goal: 0 - No pain  Pain Type: Chronic pain  Pain Location: Back  Pain Orientation: Left  Pain Radiating Towards: down posterior left leg to the heel and toes. He also states the left sided pain goes up his back to the left sided neck and head. Keturah Graven He gets numbness on the left side of his head. He has been to the ER for this and released  Pain Descriptors: Sharp,Shooting  Pain Frequency: Continuous  Pain Onset: On-going  Functional Pain Assessment: Prevents or interferes some active activities and ADLs  Aggravating factors: Walking,Standing,Sitting,Lifting,Carrying  Pain Management/Relieving Factors: Rest    Functional Status    Social History:    Social History  Lives With: Family  Home Layout: Two level  Home Access: Stairs to enter with rails  Entrance Stairs - Number of Steps: 15  Bathroom Shower/Tub: Tub only (Difficulty getting out of tub)  Bathroom Toilet: Standard (Difficulty getting up from toilet)    Occupation/Interests:   Occupation: Full time employment  Type of Occupation:   Job Duties: Prolonged sitting,Moderate lifting,Climbing    Prior Level of Function:   100% Independent        Current Level of Function:   <20%      Receives Help From: Family  ADL Assistance:  (Need assit to get out of bed. Assist with socks and shoes)  Homemaking Assistance: Needs assistance  Meal Prep: Total  Laundry: Minimal  Vacuuming: Total  Cleaning:  Total  Yard Work: Minimal  Ambulation Assistance: Independent  Transfer Assistance: Independent  Active : Yes  Mode of Transportation: Car    Dominant Hand: : Right    OBJECTIVE EXAMINATION     Restrictions:         Position Activity Restriction  Other position/activity restrictions: Off work, no lifting, no push/pulling, bending, no prolonged sitting    Review of Systems:       VBI Screening / Lumbar Screening:    Bowel/bladder disturbances: No  Saddle anesthesia: No  Unexplained weight loss: No  Severe motor weakness: No  Stumbling or giving way while walking: Yes  Unrelenting pain at night: Yes    Regional Screen:         Observations:   General Observations  Description: He has a flattened low back. Good upper body posture. Neuro Screen: Lower Quarter Deep Tendon Reflex (DTR)  Right Quadriceps (L3-4):  Average/Normal  Right Gastrocnemius (S1):  Average/Normal  Left Quadriceps (L3-4):  Average/Normal  Left Gastrocnemius (S1):  Average/Normal            General AROM LE: Right WFL,Left WFL    Left PROM  Right PROM                       Left Strength  Right Strength      General Strength Testing LE: Right WNL,Left WNL    General Strength Testing LE: Right WNL,Left WNL            Lumbar Assessment     AROM Lumbar Spine   Lumbar spine general AROM: forward flexion 30 degrees, extension 20 degrees, Bilateral side bending 25 degrees, bilateral rotation within normal limits  [place] evaluation  Repeated movements completed: Yes  Repeated movements: NO increase in peripheral symptoms of the left leg  Standing flexion: Same  Standing extension: Same  Supine flexion: Same  Prone extension: Same         Outcomes Score: Oswestry 30/50        *Indicates exercise,modality, or manual techniques to be initiated when appropriate       ASSESSMENT     Impression: Assessment: Problem List:  1. Decreased active range of motion of the trunk  2. Pain of the left low back and left posterior leg  3. Decreased ADL status 4. Unable to tolerate regular work duty 5. Oswestry initial score 30/50 indicating severe disability    Body Structures, Functions, Activity Limitations Requiring Skilled Therapeutic Intervention: Decreased functional mobility ,Decreased ADL status,Decreased ROM,Decreased tolerance to work activity,Increased pain    Statement of Medical Necessity: Physical Therapy is both indicated and medically necessary as outlined in the POC to increase the likelihood of meeting the functionally related goals stated below.      Patient's Activity Tolerance: Patient tolerated evaluation without incident      Patient's rehabilitation potential/prognosis is considered to be: Good    Factors which may impact rehabilitation potential include: Profession/work barriers     Patient Education: PT Education: Delores Manners of Care,Evaluative findings     GOALS   Patient Goal(s): Patient goals : To be pain free    Short Term Goals Completed by 3-4 treatments Goal Status   STG 1Increase in trunk active range of motion by 10-20 degrees all affected motions New   STG 2 Pain decreased of the low back to 5/10 on the left New   STG 3 Left leg pain decreased to 1/10 or less New   STG 4       STG 5           Long Term Goals Completed by 5-10 treatments Goal Status   LTG 1 Active range of motion of the trunk will be within normal limits New   LTG 2 Pain of the left lower back will be reported to be 2/10 or less New   LTG 3 No further pain reported of the left leg New   LTG 4 Patient will report that he is independent in his ADL's New   LTG 5 Patient will be able to tolerate regular work duty New   LTG 6 Long term goal 6: Oswestry discharge score will be greater than 30/50           TREATMENT PLAN       Requires PT Follow-Up: Yes    Treatment may include any combination of the following: ROM,Functional mobility training,ADL/Self-care training,Manual Therapy - Joint Manipulation,Manual Therapy - Soft Tissue Mobilization,Pain management,Return to work related activity,Home exercise program,Modalities     Frequency / Duration:  Patient to be seen 2 times weekly times per week for 5 weeks weeks  Plan Comment:               Eval Complexity:   History: Personal Factors and/or Comorbidities Impacting POC: Low  Examination of body system(s) including body structures and functions, activity limitations, and/or participation restrictions: Low    POST-PAIN     Pain Rating (0-10 pain scale):  9 /10  Location and pain description same as pre-treatment unless indicated.    Action: [] NA  [] Call Physician  [] Perform

## 2022-06-02 ENCOUNTER — PROCEDURE VISIT (OUTPATIENT)
Dept: PAIN MANAGEMENT | Age: 37
End: 2022-06-02
Payer: MEDICAID

## 2022-06-02 ENCOUNTER — TELEPHONE (OUTPATIENT)
Dept: PAIN MANAGEMENT | Age: 37
End: 2022-06-02

## 2022-06-02 DIAGNOSIS — M54.16 LUMBAR RADICULOPATHY: Primary | ICD-10-CM

## 2022-06-02 PROCEDURE — 64483 NJX AA&/STRD TFRM EPI L/S 1: CPT | Performed by: PHYSICAL MEDICINE & REHABILITATION

## 2022-06-02 RX ORDER — LIDOCAINE HYDROCHLORIDE 10 MG/ML
5 INJECTION, SOLUTION INFILTRATION; PERINEURAL ONCE
Status: COMPLETED | OUTPATIENT
Start: 2022-06-02 | End: 2022-06-02

## 2022-06-02 RX ORDER — SODIUM CHLORIDE 9 MG/ML
3 INJECTION INTRAVENOUS ONCE
Status: COMPLETED | OUTPATIENT
Start: 2022-06-02 | End: 2022-06-02

## 2022-06-02 RX ORDER — DEXAMETHASONE SODIUM PHOSPHATE 10 MG/ML
5 INJECTION, SOLUTION INTRAMUSCULAR; INTRAVENOUS ONCE
Status: COMPLETED | OUTPATIENT
Start: 2022-06-02 | End: 2022-06-02

## 2022-06-02 RX ADMIN — SODIUM CHLORIDE 3 ML: 9 INJECTION INTRAVENOUS at 09:57

## 2022-06-02 RX ADMIN — Medication 0.5 MEQ: at 09:56

## 2022-06-02 RX ADMIN — LIDOCAINE HYDROCHLORIDE 5 ML: 10 INJECTION, SOLUTION INFILTRATION; PERINEURAL at 09:56

## 2022-06-02 RX ADMIN — DEXAMETHASONE SODIUM PHOSPHATE 5 MG: 10 INJECTION, SOLUTION INTRAMUSCULAR; INTRAVENOUS at 09:56

## 2022-06-02 NOTE — PROGRESS NOTES
Lumbar Transforaminal Epidural Steroid Injection (TFESI)    Patient Name: Ramiro Altman   : 1985  Date: 2022     Physician: Josefina Medina MD     INDICATIONS: Ramiro Altman is a 39 y.o. male who presents with symptoms and physical exam findings consistent with lumbar radiculopathy. He has had persistent pain that limits his activities of daily living. The pain is persistent despite conservative measures. He has significant functional and psychological impairment due to this condition. Given his symptoms, physical exam findings, impairment in activities of daily living, and lack of response to conservative measures, consideration for lumbar transforaminal epidural corticosteroid injection was given. Discussed the risks including but not limited to bleeding, infection, worsened pain, damage to surrounding structures, side effects, toxicity, allergic reactions to medications used, need for surgery, headache, vision changes, difficulty with chewing and/or swallowing, immune and stress-response dysfunction, fat necrosis, skin pigmentation changes, blood sugar elevation, as well as catastrophic injury such as vision loss, paralysis, spinal cord or plexus injury, cerebral brainstem or spinal cord infarction, intrathecal injection, spinal cord puncture, arachnoiditis, discitis, stroke, bowel or bladder incontinence, ventilator dependence, loss of use of the arms and/or legs, and death. Discussed off-label use of corticosteroids and how the Food and Drug Administration (FDA) has not approved corticosteroids for epidural use. Discussed the risks, benefits, alternative procedures, and alternatives to the procedure including no procedure at all. Discussed that we cannot undo any permanent neurologic or orthopaedic damage or change the course of any underlying disease. After thorough discussion, patient expressed understanding and willingness to proceed. Written informed consent was obtained and is in the chart.  Verbal consent to proceed was obtained. PROCEDURE: Standard ASIPP guidelines were followed and sterile technique used. Area was cleaned with Betadine three times. Fluoroscopic guidance was used for this procedure. The L5 vertebral body was taken as the first lumbar-appearing vertebral body directly above the sacrum on a lateral view. The skin and subcutaneous tissue was anesthetized with 2 mL of 1% preservative-free lidocaine and 0.5 mEq sodium bicarbonate with a 27 gauge 1.5 inch needle. Then a 25 gauge 5 inch spinal needle was used for the remainder of the procedure. Length was suboptimal, so the needle switched to a 25-gauge 6 inch spinal needle where the length was then appropriate. There was appropriate spread of contrast in the anterior epidural space and appropriate spread of contrast around the exiting nerve root. The 6 oclock position of the pedicle was identified. Multiple views of fluoroscopy including lateral were used to confirm accurate needle placement of depth. Injection of 1 mL of contrast dye was free of any subdural or vascular spread or any other aberrant uptake. Live fluoroscopy was used for contrast injection. Aspiration was negative throughout the procedure. An injectate containing 0.5 mL of 5 mg of Dexamethasone and 3 ml of 0.9% preservative-free normal saline was injected slowly and without difficulty. Patient tolerated the procedure well, no obvious complications occurred during the procedure. Patient was appropriately monitored and discharged home in stable condition with his usual motor strength. Post-procedure instructions were given to patient.          [] Bilateral [] T12-L1    [] L1-2   [] Right [] L2-3    [] L3-4   [x] Left [] L4-5    [x] L5-S1                    Dominique, [facility] [address]  Phone [phone]/[facility] [phone]

## 2022-06-02 NOTE — TELEPHONE ENCOUNTER
Patient was first seen by Dr. Marcia Cranker on 5-9-2022. That office note states it is okay for [place] paperwork and Short Term Disability. Will Dr. Marcia Cranker excuse patient from work beginning 4- until 7-1-2022?     First seen 5-9-2022  Last visit 6-2-2022  EMG 6-6-2022  Follow up w/NP 6-9-2022

## 2022-06-03 NOTE — TELEPHONE ENCOUNTER
Chart reviewed, at the time of the consultation on 5/9/2022 patient noted that he last worked on 4/22/2022 that he has not been able to work due to pain.    -Okay for [facility] paperwork and short-term disability from 4/29/2022 until 7/1/2022

## 2022-06-06 ENCOUNTER — OFFICE VISIT (OUTPATIENT)
Dept: PAIN MANAGEMENT | Age: 37
End: 2022-06-06
Payer: MEDICAID

## 2022-06-06 DIAGNOSIS — M79.605 LEFT LEG PAIN: Primary | ICD-10-CM

## 2022-06-06 PROCEDURE — 95911 NRV CNDJ TEST 9-10 STUDIES: CPT | Performed by: PHYSICAL MEDICINE & REHABILITATION

## 2022-06-06 PROCEDURE — 95886 MUSC TEST DONE W/N TEST COMP: CPT | Performed by: PHYSICAL MEDICINE & REHABILITATION

## 2022-06-06 NOTE — PROGRESS NOTES
Electromyography (EMG)/Nerve conduction studies (NCS) Report: Lower Extremity    Name: Jose Griffin   YOB: 1985  Date of Service: 6/6/2022   Provider: Bib Peraza MD        INDICATIONS:  Jose Griffin is a 39 y.o. male who presents for electrodiagnostic evaluation for left leg pain. Both limbs are necessary to examine in order to evaluate for any evidence of systemic disease as well as establish normal baseline values from which to compare any abnormal unilateral findings. The study is explained and verbal consent to proceed is obtained. NERVE CONDUCTION STUDIES:    Sensory nerve conduction studies: Bilateral sural and superficial peroneal sensory nerve conduction studies demonstrate normal peak latencies and amplitudes. Bilateral lower limb temperatures are normal.     Motor nerve conduction studies: Bilateral peroneal motor nerve conduction studies with pickup over the extensor digitorum brevis demonstrate normal distal latencies and amplitudes. Bilateral tibial motor nerve conduction studies with pickup over the abductor hallucis demonstrate normal distal latencies and amplitudes. H reflex: Bilateral H reflexes are difficult to elicit. ELECTROMYOGRAPHY: A disposable monopolar needle is used to evaluate bilateral vastus medialis, tibialis anterior, extensor hallucis longus, flexor digitorum longus, peroneus longus, medial gastrocnemius, and lateral gastrocnemius. All of the muscles sampled are free of any increased insertional activity or any abnormal spontaneous activity. Motor unit recruitment is unremarkable. Bilateral low lumbar paraspinal muscle sampling is free of any increased insertional activity or any abnormal spontaneous activity. SUMMARY:  This study is normal. There is no current electrodiagnostic evidence for an active bilateral lumbosacral motor radiculopathy or generalized large fiber sensorimotor peripheral polyneuropathy.      RECOMMENDATIONS: Today's study does not explain the patient's left leg pain. The patient should follow up as previously instructed. If his symptoms persist or worsen, further electrodiagnostic evaluation may be considered if the patient is agreeable. Clinical correlation is recommended.

## 2022-06-08 NOTE — TELEPHONE ENCOUNTER
[employer] FMLA form faxed ([phone]). Original ready for patient to  at [facility] . Left message informing patient.

## 2022-06-09 ENCOUNTER — OFFICE VISIT (OUTPATIENT)
Dept: PAIN MANAGEMENT | Age: 37
End: 2022-06-09
Payer: MEDICAID

## 2022-06-09 ENCOUNTER — TELEPHONE (OUTPATIENT)
Dept: PAIN MANAGEMENT | Age: 37
End: 2022-06-09

## 2022-06-09 VITALS — TEMPERATURE: 96.9 F | WEIGHT: 249 LBS | BODY MASS INDEX: 36.88 KG/M2 | HEIGHT: 69 IN

## 2022-06-09 DIAGNOSIS — M54.16 LUMBAR RADICULOPATHY: ICD-10-CM

## 2022-06-09 DIAGNOSIS — M47.817 LUMBOSACRAL SPONDYLOSIS WITHOUT MYELOPATHY: Primary | ICD-10-CM

## 2022-06-09 PROCEDURE — 4004F PT TOBACCO SCREEN RCVD TLK: CPT | Performed by: NURSE PRACTITIONER

## 2022-06-09 PROCEDURE — G8417 CALC BMI ABV UP PARAM F/U: HCPCS | Performed by: NURSE PRACTITIONER

## 2022-06-09 PROCEDURE — 99214 OFFICE O/P EST MOD 30 MIN: CPT | Performed by: NURSE PRACTITIONER

## 2022-06-09 PROCEDURE — G8427 DOCREV CUR MEDS BY ELIG CLIN: HCPCS | Performed by: NURSE PRACTITIONER

## 2022-06-09 RX ORDER — GABAPENTIN 600 MG/1
600 TABLET ORAL 3 TIMES DAILY
Qty: 90 TABLET | Refills: 0 | Status: SHIPPED | OUTPATIENT
Start: 2022-06-09 | End: 2022-08-12

## 2022-06-09 ASSESSMENT — ENCOUNTER SYMPTOMS
BACK PAIN: 1
SHORTNESS OF BREATH: 0
BLOOD IN STOOL: 0

## 2022-06-09 NOTE — PROGRESS NOTES
Francesco Clement  (1985)    6/9/2022    Subjective:     Francesco Clement is 39 y.o. male who complains today of:    Chief Complaint   Patient presents with    Back Pain    Leg Pain         Allergies:  Aspirin    Past Medical History:   Diagnosis Date    Arthritis     Asthma     Chronic back pain     HA (headache)     Lumbar radiculopathy     Pain and swelling of lower leg 11/16/2021    Varicose veins of right lower extremity with pain 11/16/2021     Past Surgical History:   Procedure Laterality Date    APPENDECTOMY       Family History   Problem Relation Age of Onset    Hypothyroidism Mother     Arthritis Mother     Hypertension Mother     Coronary Art Dis Paternal Grandmother     Heart Attack Paternal Grandmother     Hypertension Father      Social History     Socioeconomic History    Marital status:      Spouse name: Not on file    Number of children: Not on file    Years of education: Not on file    Highest education level: Not on file   Occupational History    Not on file   Tobacco Use    Smoking status: Current Every Day Smoker     Packs/day: 0.00     Types: Cigarettes    Smokeless tobacco: Never Used    Tobacco comment: Quit 1yr ago   Vaping Use    Vaping Use: Every day    Substances: Nicotine    Devices: Pre-filled pod   Substance and Sexual Activity    Alcohol use: Yes     Comment: socially    Drug use: Never    Sexual activity: Yes     Partners: Female   Other Topics Concern    Not on file   Social History Narrative    Work at the [employer]     Has 2 children     Has 4 siblings. Not in close contact     Born in [place]. Moved to [place] 2018    Hobbies: none.        Social Determinants of Health     Financial Resource Strain: Low Risk     Difficulty of Paying Living Expenses: Not hard at all   Food Insecurity: No Food Insecurity    Worried About Running Out of Food in the Last Year: Never true    [place] of Food in the Last Year: Never true   Transportation Needs:     Lack of Transportation (Medical): Not on file    Lack of Transportation (Non-Medical): Not on file   Physical Activity:     Days of Exercise per Week: Not on file    Minutes of Exercise per Session: Not on file   Stress:     Feeling of Stress : Not on file   Social Connections:     Frequency of Communication with Friends and Family: Not on file    Frequency of Social Gatherings with Friends and Family: Not on file    Attends Zoroastrianism Services: Not on file    Active Member of [address] [place] [address] or Organizations: Not on file    Attends Club or Organization Meetings: Not on file    Marital Status: Not on file   Intimate Partner Violence:     Fear of Current or Ex-Partner: Not on file    Emotionally Abused: Not on file    Physically Abused: Not on file    Sexually Abused: Not on file   Housing Stability:     Unable to Pay for Housing in the Last Year: Not on file    Number of [place] in the Last Year: Not on file    Unstable Housing in the Last Year: Not on file       Current Outpatient Medications on File Prior to Visit   Medication Sig Dispense Refill    albuterol sulfate HFA (PROVENTIL HFA) 108 (90 Base) MCG/ACT inhaler Inhale 2 puffs into the lungs every 6 hours as needed for Wheezing Cover what is under insurance. 18 g 3    albuterol (PROVENTIL) (2.5 MG/3ML) 0.083% nebulizer solution Take 3 mLs by nebulization every 6 hours as needed for Wheezing Cover what is under insurance. 120 each 3    LIDOCAINE PAIN RELIEF 4 % external patch       oxymetazoline (12 HOUR NASAL SPRAY) 0.05 % nasal spray 2 sprays by Nasal route 2 times daily Intranasal: Instill 2 to 3 sprays into each nostril twice daily for up to 3 days (maximum dose: 2 doses/24 hours). 12 mL 11    tadalafil (CIALIS) 10 MG tablet 10 mg at least 30 minutes prior to anticipated sexual activity as one single dose and not more than once daily.  10 tablet 1     Current Facility-Administered Medications on File Prior to Visit   Medication Dose Route Frequency Provider Last Rate Last Admin    iopamidol (ISOVUE-300) 61 % injection 1 mL  1 mL Other ONCE PRN Marianela Hart MD        dexamethasone (PF) (DECADRON) injection 10 mg  10 mg Other Once Ata Talavera DO               Pt presents today for a f/u of chronic low back and LLE pain. XIN did not help at all. Pt feels pain level and functioning improves with prescribed medications and is able to perform ADLs. Pt feels that walking and sitting aggravates the pain. Pt c/o LLE pain radiating numbness and tingling down the left leg to the big toe. 6/6/2022 BLE EMG was normal.  Patient has had pain for about 5 months. He is currently on short-term disability due to his pain. History of headache and varicose veins. Was prescribed diclofenac and gabapentin. MRI lumbar spine 5/19/2022 shows mild degenerative changes in lower lumbar spine, no canal stenosis. X-ray lumbar spine 4/29/2022 was essentially negative. 6/2/2022 left L5-S1 XIN      Review of Systems   Constitutional: Negative for appetite change and fever. HENT: Negative for hearing loss. Eyes: Negative for visual disturbance. Respiratory: Negative for shortness of breath. Gastrointestinal: Negative for blood in stool. Genitourinary: Negative for difficulty urinating and hematuria. Musculoskeletal: Positive for arthralgias and back pain. Skin: Negative for rash. Neurological: Negative for facial asymmetry. Hematological: Negative for adenopathy. Psychiatric/Behavioral: Negative for self-injury. All other systems reviewed and are negative. Objective:     Vitals:  Temp 96.9 °F (36.1 °C)   Ht 5' 9\" (1.753 m)   Wt 249 lb (112.9 kg)   BMI 36.77 kg/m² Pain Score:   7      Physical Exam  Vitals and nursing note reviewed. Pt is alert and oriented x 3. Recent and remote memory is intact. Mood, judgement and affect are normal.  No signs of distress or SOB noted. Visualized skin intact. agrees with above plan. Utox reviewed and appropriate from 5/9/22 (+gabapentin). We will go ahead and order diagnostic bilateral L2, L3, L4, L5 medial branch blocks to see if the patient is a candidate for RF ablation. he has failed conservative treatment in the past. Anatomic model of pathology was shown. Risks and benefits of the procedure were discussed. All questions were answered and patient understands and agrees with the plan. Will continue medications for chronic pain as they help pt function with ADL and improve quality of life. OARRS was reviewed. This NP saw pt under direct supervision of Dr. Halina Yadav. Follow up:  Return in about 4 weeks (around 7/7/2022) for F/U post procedure and reassess pain/medications.     PILAR Lino - CNP

## 2022-06-09 NOTE — TELEPHONE ENCOUNTER
AUBRIE L2,3,4,5 MBB    NO AUTH REQUIRED    OK to schedule procedure approved as above. Please note sides/levels approved and date range. (If applicable, sides/levels approved may differ from those ordered)    TO BE SCHEDULED WITH DR. Halima Dominguez

## 2022-06-09 NOTE — TELEPHONE ENCOUNTER
ORDER PLACED:    Date: 06/09/2022  Description: BILAT L2,3,4,5 MBB  Order Number: [id number]  Ordering Provider: LUIS  Performing Provider: Ihsan Choi  CPT Codes: 26898, 72676, Mack Joyce  ICD10 Codes: B70.943

## 2022-06-10 ENCOUNTER — OFFICE VISIT (OUTPATIENT)
Dept: FAMILY MEDICINE CLINIC | Age: 37
End: 2022-06-10
Payer: MEDICAID

## 2022-06-10 VITALS
HEART RATE: 86 BPM | OXYGEN SATURATION: 97 % | WEIGHT: 249 LBS | SYSTOLIC BLOOD PRESSURE: 124 MMHG | TEMPERATURE: 97.9 F | DIASTOLIC BLOOD PRESSURE: 68 MMHG | BODY MASS INDEX: 36.88 KG/M2 | HEIGHT: 69 IN

## 2022-06-10 DIAGNOSIS — R09.81 NASAL CONGESTION: ICD-10-CM

## 2022-06-10 DIAGNOSIS — M54.16 LUMBAR RADICULOPATHY: Primary | ICD-10-CM

## 2022-06-10 PROCEDURE — 99213 OFFICE O/P EST LOW 20 MIN: CPT | Performed by: FAMILY MEDICINE

## 2022-06-10 PROCEDURE — G8427 DOCREV CUR MEDS BY ELIG CLIN: HCPCS | Performed by: FAMILY MEDICINE

## 2022-06-10 PROCEDURE — G8417 CALC BMI ABV UP PARAM F/U: HCPCS | Performed by: FAMILY MEDICINE

## 2022-06-10 PROCEDURE — 4004F PT TOBACCO SCREEN RCVD TLK: CPT | Performed by: FAMILY MEDICINE

## 2022-06-10 NOTE — PROGRESS NOTES
Chief Complaint   Patient presents with    1 Month Follow-Up    Nasal Congestion     Pt reports this is the same    Back Pain     Pt reports no improvement in back pain        HPI: Mike Latif 39 y.o. male presenting for      Patient reports that he was in the ED for low back pain   Patient was in the emergency department couple days ago after having back pain with radiculopathy down the left leg. Patient reports he feels a numbness tingling sensation that goes from his lower back all the way down to his toe. Patient x-rays there that were normal however had x-rays done last year which did show some osteoarthritis of the lumbar spine. Patient works as a , and is unable to sit more than 10 minutes without having pain. In the emergency department, patient was given oxycodone, naproxen, muscle relaxant, lidocaine with some relief of symptoms. Patient reports that the medication helped however was only temporary. Patient reports that the naproxen did not help at all. 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 F/u   Patient reports that the lower back pain has not improved. Is seeing pain management and saw that yesterday. Patient reports that the 300 mg of gabapentin do not help. It was recently increased to 600 mg. Patient denies any urine or stool incontinence. Stopped taking meloxicam due to it causing elevation in blood pressures. Patient denies any fevers, chills, nausea, vomiting or chest pain, shortness of breath, abdominal pain, urination, change stools. F/u   No changes in his pain   Did test but unable to see where the pain is coming from   Wanted him to see the neurologist   Gabapentin is not helping   Will try the injections to see if it helps and wanted. Congestions   Chronic congestion   Tried many agents including neti pot, saline nasal spray with no relief of symptoms.    Would like to see ENT > Current Outpatient Medications   Medication Sig Dispense Refill    gabapentin (NEURONTIN) 600 MG tablet Take 1 tablet by mouth 3 times daily for 30 days. 90 tablet 0    diclofenac (VOLTAREN) 50 MG EC tablet Take 1 tablet by mouth 3 times daily Take with food, avoid other NSAIDs (Ibuprofen) and steroids 90 tablet 0    albuterol (PROVENTIL) (2.5 MG/3ML) 0.083% nebulizer solution Take 3 mLs by nebulization every 6 hours as needed for Wheezing Cover what is under insurance. 120 each 3    LIDOCAINE PAIN RELIEF 4 % external patch       oxymetazoline (12 HOUR NASAL SPRAY) 0.05 % nasal spray 2 sprays by Nasal route 2 times daily Intranasal: Instill 2 to 3 sprays into each nostril twice daily for up to 3 days (maximum dose: 2 doses/24 hours). 12 mL 11    tadalafil (CIALIS) 10 MG tablet 10 mg at least 30 minutes prior to anticipated sexual activity as one single dose and not more than once daily. 10 tablet 1    albuterol sulfate HFA (PROVENTIL HFA) 108 (90 Base) MCG/ACT inhaler Inhale 2 puffs into the lungs every 6 hours as needed for Wheezing Cover what is under insurance. 18 g 3     Current Facility-Administered Medications   Medication Dose Route Frequency Provider Last Rate Last Admin    iopamidol (ISOVUE-300) 61 % injection 1 mL  1 mL Other ONCE PRN Nan Contreras MD        dexamethasone (PF) (DECADRON) injection 10 mg  10 mg Other Once [employer], DO            ROS  CONSTITUTIONAL: The patient denies fevers, chills, sweats and body ache. HEENT: Denies headache, blurry vision, eye pain, tinnitus, vertigo,  sore throat, neck or thyroid masses. RESPIRATORY: Denies cough, sputum, hemoptysis. Admits to congestion. CARDIAC: Denies chest pain, pressure, palpitations, Denies lower extremity edema. GASTROINTESTINAL: Denies abdominal pain, constipation, diarrhea, bleeding in the stools, admits to acid reflux. GENITOURINARY: admits to erectile dysfunction.    NEUROLOGIC: Denies headaches, dizziness, syncope, weakness  MUSCULOSKELETAL: admits to chronic back pain. ENDOCRINOLOGY: Denies heat or cold intolerance. HEMATOLOGY: Denies easy bleeding or blood transfusion,anemia  DERMATOLOGY: rash in the groin area and the intergluteal folds. PSYCHIATRY: Denies depression, agitation or anxiety. Past Medical History:   Diagnosis Date    Arthritis     Asthma     Chronic back pain     HA (headache)     Lumbar radiculopathy     Pain and swelling of lower leg 11/16/2021    Varicose veins of right lower extremity with pain 11/16/2021        Past Surgical History:   Procedure Laterality Date    APPENDECTOMY          Family History   Problem Relation Age of Onset    Hypothyroidism Mother     Arthritis Mother     Hypertension Mother     Coronary Art Dis Paternal Grandmother     Heart Attack Paternal Grandmother     Hypertension Father         Social History     Socioeconomic History    Marital status:      Spouse name: Not on file    Number of children: Not on file    Years of education: Not on file    Highest education level: Not on file   Occupational History    Not on file   Tobacco Use    Smoking status: Current Every Day Smoker     Packs/day: 0.00     Types: Cigarettes    Smokeless tobacco: Never Used    Tobacco comment: Quit 1yr ago   Vaping Use    Vaping Use: Every day    Substances: Nicotine    Devices: Pre-filled pod   Substance and Sexual Activity    Alcohol use: Yes     Comment: socially    Drug use: Never    Sexual activity: Yes     Partners: Female   Other Topics Concern    Not on file   Social History Narrative    Work at the [employer]     Has 2 children     Has 4 siblings. Not in close contact     Born in [place]. Moved to [place] 2018    Hobbies: none.        Social Determinants of Health     Financial Resource Strain: Low Risk     Difficulty of Paying Living Expenses: Not hard at all   Food Insecurity: No Food Insecurity    Worried About Running Out of Food in the Last Year: Never true    [address] in the Last Year: Never true   Transportation Needs:     Lack of Transportation (Medical): Not on file    Lack of Transportation (Non-Medical): Not on file   Physical Activity:     Days of Exercise per Week: Not on file    Minutes of Exercise per Session: Not on file   Stress:     Feeling of Stress : Not on file   Social Connections:     Frequency of Communication with Friends and Family: Not on file    Frequency of Social Gatherings with Friends and Family: Not on file    Attends Scientology Services: Not on file    Active Member of [address] [place] [address] [employer] or Organizations: Not on file    Attends Club or Organization Meetings: Not on file    Marital Status: Not on file   Intimate Partner Violence:     Fear of Current or Ex-Partner: Not on file    Emotionally Abused: Not on file    Physically Abused: Not on file    Sexually Abused: Not on file   Housing Stability:     Unable to Pay for Housing in the Last Year: Not on file    Number of [place] in the Last Year: Not on file    Unstable Housing in the Last Year: Not on file        /68   Pulse 86   Temp 97.9 °F (36.6 °C) (Temporal)   Ht 5' 9\" (1.753 m)   Wt 249 lb (112.9 kg)   SpO2 97%   BMI 36.77 kg/m²        Physical Exam:    General appearance - alert, well appearing, and in no distress  Mental Status - alert, oriented to person, place, and time  Eyes - pupils equal and reactive, extraocular eye movements intact   Ears - bilateral TM's and external ear canals normal. Cerumen impaction in the right ear.     Nose - normal and patent, no erythema, discharge or polyps   Sinuses - Normal paranasal sinuses without tenderness   Throat - mucous membranes moist, pharynx normal without lesions   Neck - supple, no significant adenopathy   Thyroid - thyroid is normal in size without nodules or tenderness    Chest - clear to auscultation, no wheezes, rales or rhonchi, symmetric air entry   Heart - normal rate, regular rhythm, normal S1, S2, no murmurs, rubs, clicks or gallops  Abdomen - soft, nontender, nondistended, no masses or organomegaly   Back exam - full range of motion, no tenderness, palpable spasm or pain on motion   Neurological - alert, oriented, normal speech, no focal findings or movement disorder noted   Musculoskeletal - tenderness to palpation in the lumbar spine and tenderness to palpation in the paraspinal muscles. Extremities - peripheral pulses normal, no pedal edema, no clubbing or cyanosis   Skin - erythematous papular rash at the base of the scrotum and in the intergluteal folds. There is some tenderness to palpation. Palpable varicose veins in the lower extremities more in the right than the left. Labs   No results found for: TSHREFLEX  No results found for: TSH    1. Lumbar radiculopathy  Chronic issue. No relief with pain management. Still doing workup and seeing if he can be candidate for steroid injections   - Tens Unit MISC; by Does not apply route Use as directed for low back pain. Dispense: 1 each; Refill: 0    2. Nasal congestion  No relief with OTC meds. - Ez Cesar MD, Otolaryngology, [facility]            Please note, this report has been partially produced using speech recognition software  and may cause  and /or contain errors related to that system including grammar, punctuation and spelling as well as words and phrases that may seem inappropriate. If there are questions or concerns please feel free to contact me to clarify.

## 2022-06-13 ENCOUNTER — TELEPHONE (OUTPATIENT)
Dept: PAIN MANAGEMENT | Age: 37
End: 2022-06-13

## 2022-06-13 NOTE — TELEPHONE ENCOUNTER
ORDER PLACED:    Date: 6/9/22  Description: BILAT L2,3,4,5 MBB  Order Number: [id number]  Ordering Provider: LUIS  Performing Provider: Kiara Landers  CPT Codes: 46428, 44423, Mack Joyce  ICD10 Codes: W06.969

## 2022-06-13 NOTE — TELEPHONE ENCOUNTER
BILAT L2,3,4,5 MBB    NO AUTH REQUIRED    OK to schedule procedure approved as above. Please note sides/levels approved and date range. (If applicable, sides/levels approved may differ from those ordered)    TO BE SCHEDULED WITH DR. Marin Hernandez

## 2022-06-15 ENCOUNTER — TELEPHONE (OUTPATIENT)
Dept: PAIN MANAGEMENT | Age: 37
End: 2022-06-15

## 2022-06-15 NOTE — TELEPHONE ENCOUNTER
Patient temporarily totally disabled 4- until approximately 7-1-2022. The [place] Disability form filled out and to be placed on Dr. Samantha Sheikh desk for signature.

## 2022-06-16 DIAGNOSIS — G47.00 INSOMNIA, UNSPECIFIED TYPE: Primary | ICD-10-CM

## 2022-06-16 NOTE — TELEPHONE ENCOUNTER
The [place] Disability form faxed along w/records 5-9-2022 to present ([phone]). Original ready for patient to  at Pain  at next appointment.

## 2022-07-11 ENCOUNTER — TELEPHONE (OUTPATIENT)
Dept: PAIN MANAGEMENT | Age: 37
End: 2022-07-11

## 2022-07-12 ENCOUNTER — TELEPHONE (OUTPATIENT)
Dept: PAIN MANAGEMENT | Age: 37
End: 2022-07-12

## 2022-07-12 NOTE — TELEPHONE ENCOUNTER
Left message on voicemail informing patient he will need to be seen by NP to review the form he needs filled out to return to work. He canceled 7-7-2022 appointment and was excused form work by Dr. Fernanda March until 7-1-2022. 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

## 2022-07-12 NOTE — TELEPHONE ENCOUNTER
Patient informed. He has follow up scheduled for 7/21/22 but is hoping for a sooner appt. I told him to call daily to check on same day cancellations.

## 2022-07-13 NOTE — TELEPHONE ENCOUNTER
Ayden Morfin NP reviewed form and gave it to Dr. Kathia Brown. Per Glenwood Lefort, MA Dr. Wray Keas wants patient seen to fill out form. Emilee Harris at  has form to be given to patient to discuss at his follow up appointment on 7-.

## 2022-07-13 NOTE — TELEPHONE ENCOUNTER
Form given to Paxton Dexter MA to ask Kolby Abel to review. We are asking if she is comfortable filling out form before patient is seen on 7- so he may return to work sooner. Per Tan Polanco agreed to review and will determine if she is able to fill out now.

## 2022-07-19 ENCOUNTER — OFFICE VISIT (OUTPATIENT)
Dept: PAIN MANAGEMENT | Age: 37
End: 2022-07-19
Payer: MEDICAID

## 2022-07-19 VITALS
TEMPERATURE: 96.7 F | HEIGHT: 69 IN | DIASTOLIC BLOOD PRESSURE: 82 MMHG | BODY MASS INDEX: 37.77 KG/M2 | SYSTOLIC BLOOD PRESSURE: 122 MMHG | WEIGHT: 255 LBS

## 2022-07-19 DIAGNOSIS — M47.817 LUMBOSACRAL SPONDYLOSIS WITHOUT MYELOPATHY: Primary | ICD-10-CM

## 2022-07-19 DIAGNOSIS — M54.16 LUMBAR RADICULOPATHY: ICD-10-CM

## 2022-07-19 DIAGNOSIS — M79.605 LEFT LEG PAIN: ICD-10-CM

## 2022-07-19 PROCEDURE — 99213 OFFICE O/P EST LOW 20 MIN: CPT | Performed by: NURSE PRACTITIONER

## 2022-07-19 PROCEDURE — G8427 DOCREV CUR MEDS BY ELIG CLIN: HCPCS | Performed by: NURSE PRACTITIONER

## 2022-07-19 PROCEDURE — G8417 CALC BMI ABV UP PARAM F/U: HCPCS | Performed by: NURSE PRACTITIONER

## 2022-07-19 PROCEDURE — 4004F PT TOBACCO SCREEN RCVD TLK: CPT | Performed by: NURSE PRACTITIONER

## 2022-07-19 ASSESSMENT — ENCOUNTER SYMPTOMS
SHORTNESS OF BREATH: 0
BLOOD IN STOOL: 0
BACK PAIN: 1

## 2022-07-19 NOTE — PROGRESS NOTES
Veronica Wright  (1985)    7/19/2022    Subjective:     Veronica Wright is 39 y.o. male who complains today of:    Chief Complaint   Patient presents with    Back Pain     Lower, left         Allergies:  Aspirin    Past Medical History:   Diagnosis Date    Arthritis     Asthma     Chronic back pain     HA (headache)     Lumbar radiculopathy     Pain and swelling of lower leg 11/16/2021    Varicose veins of right lower extremity with pain 11/16/2021     Past Surgical History:   Procedure Laterality Date    APPENDECTOMY       Family History   Problem Relation Age of Onset    Hypothyroidism Mother     Arthritis Mother     Hypertension Mother     Coronary Art Dis Paternal Grandmother     Heart Attack Paternal Grandmother     Hypertension Father      Social History     Socioeconomic History    Marital status:      Spouse name: Not on file    Number of children: Not on file    Years of education: Not on file    Highest education level: Not on file   Occupational History    Not on file   Tobacco Use    Smoking status: Every Day     Packs/day: 0.00     Types: Cigarettes    Smokeless tobacco: Never    Tobacco comments:     Quit 1yr ago   Vaping Use    Vaping Use: Every day    Substances: Nicotine    Devices: Pre-filled pod   Substance and Sexual Activity    Alcohol use: Yes     Comment: socially    Drug use: Never    Sexual activity: Yes     Partners: Female   Other Topics Concern    Not on file   Social History Narrative    Work at the [employer]     Has 2 children     Has 4 siblings. Not in close contact     Born in [place]. [place]oved to [place]    Hobbies: none.        Social Determinants of Health     Financial Resource Strain: Low Risk     Difficulty of Paying Living Expenses: Not hard at all   Food Insecurity: No Food Insecurity    Worried About Running Out of Food in the Last Year: Never true    Ran Out of Food in the Last Year: Never true   Transportation Needs: Not on file   Physical Activity: Not on file   Stress: Not on file   Social Connections: Not on file   Intimate Partner Violence: Not on file   Housing Stability: Not on file       Current Outpatient Medications on File Prior to Visit   Medication Sig Dispense Refill    albuterol (PROVENTIL) (2.5 MG/3ML) 0.083% nebulizer solution Take 3 mLs by nebulization every 6 hours as needed for Wheezing Cover what is under insurance. 120 each 3    LIDOCAINE PAIN RELIEF 4 % external patch       oxymetazoline (12 HOUR NASAL SPRAY) 0.05 % nasal spray 2 sprays by Nasal route 2 times daily Intranasal: Instill 2 to 3 sprays into each nostril twice daily for up to 3 days (maximum dose: 2 doses/24 hours). 12 mL 11    tadalafil (CIALIS) 10 MG tablet 10 mg at least 30 minutes prior to anticipated sexual activity as one single dose and not more than once daily. 10 tablet 1    Tens Unit MISC by Does not apply route Use as directed for low back pain. 1 each 0    gabapentin (NEURONTIN) 600 MG tablet Take 1 tablet by mouth 3 times daily for 30 days. 90 tablet 0    diclofenac (VOLTAREN) 50 MG EC tablet Take 1 tablet by mouth 3 times daily Take with food, avoid other NSAIDs (Ibuprofen) and steroids 90 tablet 0    albuterol sulfate HFA (PROVENTIL HFA) 108 (90 Base) MCG/ACT inhaler Inhale 2 puffs into the lungs every 6 hours as needed for Wheezing Cover what is under insurance. 18 g 3     Current Facility-Administered Medications on File Prior to Visit   Medication Dose Route Frequency Provider Last Rate Last Admin    iopamidol (ISOVUE-300) 61 % injection 1 mL  1 mL Other ONCE PRN Tuan Morrow MD        dexamethasone (PF) (DECADRON) injection 10 mg  10 mg Other Once Sherrian Bolus, DO               Pt presents today for a f/u of chronic low back and LLE pain. He is here today for RTW forms. Gabapentin and  diclofenac were increased last OV, didn't help, patient stopped taking. XIN did not help at all.  Pt feels pain level and functioning improves with prescribed medications 1. Lumbosacral spondylosis without myelopathy        2. Lumbar radiculopathy        3. Left leg pain            Plan:          No orders of the defined types were placed in this encounter. No orders of the defined types were placed in this encounter. Discussed options with the patient today. XIN did not help. MRI and EMG negative. Lumbar MBB next week. Meloxicam caused issue with blood pressure; gabapentin and diclofenac with no relief. Ok to return to work as patient requests. All questions were answered. Pt verbalized understanding and agrees with above plan. Utox reviewed and appropriate from 5/9/22 (+gabapentin). Will continue medications for chronic pain as they help pt function with ADL and improve quality of life. OARRS was reviewed. This NP saw pt under direct supervision of Dr. Madie Pereira. Follow up:  Return for F/U post procedure and reassess pain/medications.     Christophe Argueta, PILAR - CNP

## 2022-07-19 NOTE — TELEPHONE ENCOUNTER
Hermenia Osgood, cnp filled out form after todays visit. Form has been faxed to fax # [phone]. Pt aware to  his copy at our  in [address].

## 2022-07-29 DIAGNOSIS — N52.9 ERECTILE DYSFUNCTION, UNSPECIFIED ERECTILE DYSFUNCTION TYPE: ICD-10-CM

## 2022-07-29 NOTE — TELEPHONE ENCOUNTER
Patient requesting medication refill. Please approve or deny this request.    Rx requested:  Requested Prescriptions     Pending Prescriptions Disp Refills    tadalafil (CIALIS) 10 MG tablet 10 tablet 1     Sig: 10 mg at least 30 minutes prior to anticipated sexual activity as one single dose and not more than once daily.          Last Office Visit:   6/10/2022      Next Visit Date:  Future Appointments   Date Time Provider Joe Bernard   8/12/2022  2:45 PM Robert Candelaria MD [address] [place] [address] 7

## 2022-08-01 RX ORDER — TADALAFIL 10 MG/1
TABLET ORAL
Qty: 10 TABLET | Refills: 1 | Status: SHIPPED | OUTPATIENT
Start: 2022-08-01 | End: 2022-08-12 | Stop reason: DRUGHIGH

## 2022-08-05 NOTE — PROGRESS NOTES
[address]  PHYSICAL THERAPY PLAN OF CARE   [address], [address] [facility]         Ph: [phone]  Fax: [phone]    [] Certification  [] Recertification []  Plan of Care  [] Progress Note [x] Discharge      Referring Provider: Krissy Ruelas MD     From:  Priya Zurita PT   Patient: Temo Stern (65 y.o. male) : 1985 Date: 2022  Medical Diagnosis: Radiculopathy, lumbar region [M54.16]    Treatment Diagnosis: Painful left low back with left leg pain and decreased trunk motion    Plan of Care/Certification Expiration Date:     Progress Report Period from:  2022  to 2022    Visits to Date: 1 No Show:   Cancelled Appts:      OBJECTIVE:   Short Term Goals - Time Frame for Short term goals: 3-4 treatments    Goals Current/Discharge status  Status   Short term goal 1: Increase in trunk active range of motion by 10-20 degrees all affected motions   New   Short term goal 2: Pain decreased of the low back to 5/10 on the left   New   Short term goal 3: Left leg pain decreased to 1/10 or less   New                    Long Term Goals - Time Frame for Long term goals : 5-10 treatments  Goals Current/ Discharge status Status   Long term goal 1: Active range of motion of the trunk will be within normal limits  New   Long term goal 2: Pain of the left lower back will be reported to be 2/10 or less  New   Long term goal 3: No further pain reported of the left leg  New   Long term goal 4: Patient will report that he is independent in his ADL's  New   Long term goal 5: Patient will be able to tolerate regular work duty  New   Long term goal 6: Oswestry discharge score will be greater than 30/50                 Body Structures, Functions, Activity Limitations Requiring Skilled Therapeutic Intervention: Decreased functional mobility , Decreased ADL status, Decreased ROM, Decreased tolerance to work activity, Increased pain  Assessment: Problem List:  1.   Decreased active range of motion of the trunk  2. Pain of the left low back and left posterior leg  3. Decreased ADL status 4. Unable to tolerate regular work duty 5. Oswestry initial score 30/50 indicating severe disability  Therapy Prognosis: Good           PLAN: [] Evaluate and Treat  Frequency/Duration:  Plan Frequency: 2 times weekly  Plan weeks: 5 weeks  Current Treatment Recommendations: ROM, Functional mobility training, ADL/Self-care training, Manual Therapy - Joint Manipulation, Manual Therapy - Soft Tissue Mobilization, Pain management, Return to work related activity, Home exercise program, Modalities     Patient did not return for any further appointments. He has been called and voice messages left numerous times, but he has not called back. Due to long absence and no follow up, will discharge from program.                         Patient Status:[] Continue/ Initiate plan of Care    [x] Discharge PT. Recommend pt continue with HEP. [] Additional visits requested, Please re-certify for additional visits:    [] Hold         Signature: Electronically signed by Serafin Neri PT on 8/5/22 at 10:26 AM EDT      If you have any questions or concerns, please don't hesitate to call. Thank you for your referral.    I have reviewed this plan of care and certify a need for medically necessary rehabilitation services.     Physician Signature:__________________________________________________________  Date:  Please sign and return

## 2022-08-12 ENCOUNTER — TELEMEDICINE (OUTPATIENT)
Dept: FAMILY MEDICINE CLINIC | Age: 37
End: 2022-08-12
Payer: MEDICAID

## 2022-08-12 DIAGNOSIS — R06.02 SHORTNESS OF BREATH: ICD-10-CM

## 2022-08-12 DIAGNOSIS — N52.9 ERECTILE DYSFUNCTION, UNSPECIFIED ERECTILE DYSFUNCTION TYPE: Primary | ICD-10-CM

## 2022-08-12 DIAGNOSIS — K21.9 GASTROESOPHAGEAL REFLUX DISEASE WITHOUT ESOPHAGITIS: ICD-10-CM

## 2022-08-12 DIAGNOSIS — H10.10 ALLERGIC CONJUNCTIVITIS, UNSPECIFIED LATERALITY: Primary | ICD-10-CM

## 2022-08-12 PROCEDURE — 99213 OFFICE O/P EST LOW 20 MIN: CPT | Performed by: FAMILY MEDICINE

## 2022-08-12 RX ORDER — OMEPRAZOLE 40 MG/1
CAPSULE, DELAYED RELEASE ORAL
Qty: 30 CAPSULE | Refills: 5 | Status: SHIPPED | OUTPATIENT
Start: 2022-08-12

## 2022-08-12 RX ORDER — ALBUTEROL SULFATE 2.5 MG/3ML
2.5 SOLUTION RESPIRATORY (INHALATION) EVERY 6 HOURS PRN
Qty: 120 EACH | Refills: 3 | Status: SHIPPED | OUTPATIENT
Start: 2022-08-12

## 2022-08-12 RX ORDER — TADALAFIL 20 MG/1
20 TABLET ORAL DAILY PRN
Qty: 10 TABLET | Refills: 1 | Status: SHIPPED | OUTPATIENT
Start: 2022-08-12

## 2022-08-12 RX ORDER — KETOTIFEN FUMARATE 0.35 MG/ML
1 SOLUTION/ DROPS OPHTHALMIC 2 TIMES DAILY PRN
Qty: 5 ML | Refills: 1 | Status: SHIPPED | OUTPATIENT
Start: 2022-08-12 | End: 2022-08-12

## 2022-08-18 ENCOUNTER — HOSPITAL ENCOUNTER (OUTPATIENT)
Dept: SLEEP CENTER | Age: 37
Discharge: HOME | End: 2022-08-20
Payer: MEDICAID

## 2022-08-18 ENCOUNTER — OFFICE VISIT (OUTPATIENT)
Dept: FAMILY MEDICINE CLINIC | Age: 37
End: 2022-08-18
Payer: MEDICAID

## 2022-08-18 VITALS
OXYGEN SATURATION: 98 % | DIASTOLIC BLOOD PRESSURE: 72 MMHG | HEART RATE: 88 BPM | SYSTOLIC BLOOD PRESSURE: 120 MMHG | WEIGHT: 252.4 LBS | TEMPERATURE: 98 F | HEIGHT: 69 IN | BODY MASS INDEX: 37.38 KG/M2

## 2022-08-18 DIAGNOSIS — L60.8 CHANGE IN NAIL APPEARANCE: ICD-10-CM

## 2022-08-18 DIAGNOSIS — E66.09 CLASS 2 OBESITY DUE TO EXCESS CALORIES WITH BODY MASS INDEX (BMI) OF 37.0 TO 37.9 IN ADULT, UNSPECIFIED WHETHER SERIOUS COMORBIDITY PRESENT: Primary | ICD-10-CM

## 2022-08-18 PROCEDURE — 4004F PT TOBACCO SCREEN RCVD TLK: CPT | Performed by: FAMILY MEDICINE

## 2022-08-18 PROCEDURE — G8427 DOCREV CUR MEDS BY ELIG CLIN: HCPCS | Performed by: FAMILY MEDICINE

## 2022-08-18 PROCEDURE — G8417 CALC BMI ABV UP PARAM F/U: HCPCS | Performed by: FAMILY MEDICINE

## 2022-08-18 PROCEDURE — 95810 POLYSOM 6/> YRS 4/> PARAM: CPT

## 2022-08-18 PROCEDURE — 99214 OFFICE O/P EST MOD 30 MIN: CPT | Performed by: FAMILY MEDICINE

## 2022-08-18 RX ORDER — PHENTERMINE HYDROCHLORIDE 37.5 MG/1
37.5 TABLET ORAL
Qty: 30 TABLET | Refills: 0 | Status: SHIPPED | OUTPATIENT
Start: 2022-08-18 | End: 2022-09-17

## 2022-08-18 NOTE — PROGRESS NOTES
Chief Complaint   Patient presents with    Weight Management     Patient would like to discuss about diet plans & biatric bypass surgery due to feeling sob. HPI: Royetta Libman 40 y.o. male presenting for    Obesity   Patient reports that he has gained at least 50 -70 pounds in the past 3 yeasts   Patient ahs tried exercising but due to the back pain it has been difficult   Has tried to change eating habits difficult with  career   Has tried OTC meds with out helping. Current Outpatient Medications   Medication Sig Dispense Refill    omeprazole (PRILOSEC) 40 MG delayed release capsule TAKE 1 CAPSULE BY MOUTH EVERY MORNING BEFORE BREAKFAST 30 capsule 5    albuterol (PROVENTIL) (2.5 MG/3ML) 0.083% nebulizer solution Take 3 mLs by nebulization every 6 hours as needed for Wheezing Cover what is under insurance. 120 each 3    tadalafil (CIALIS) 20 MG tablet Take 1 tablet by mouth daily as needed for Erectile Dysfunction 10 tablet 1    LIDOCAINE PAIN RELIEF 4 % external patch       oxymetazoline (12 HOUR NASAL SPRAY) 0.05 % nasal spray 2 sprays by Nasal route 2 times daily Intranasal: Instill 2 to 3 sprays into each nostril twice daily for up to 3 days (maximum dose: 2 doses/24 hours). 12 mL 11    Tens Unit MISC by Does not apply route Use as directed for low back pain. 1 each 0    albuterol sulfate HFA (PROVENTIL HFA) 108 (90 Base) MCG/ACT inhaler Inhale 2 puffs into the lungs every 6 hours as needed for Wheezing Cover what is under insurance. 18 g 3     Current Facility-Administered Medications   Medication Dose Route Frequency Provider Last Rate Last Admin    iopamidol (ISOVUE-300) 61 % injection 1 mL  1 mL Other ONCE PRN Jay Jay Pate MD        dexamethasone (PF) (DECADRON) injection 10 mg  10 mg Other Once [employer], DO            ROS  CONSTITUTIONAL: The patient denies fevers, chills, sweats and body ache. Admits to weight gain.    HEENT: Denies headache, blurry vision, eye pain, tinnitus, vertigo,  sore throat, neck or thyroid masses. RESPIRATORY: Denies cough, sputum, hemoptysis. Admits to congestion. CARDIAC: Denies chest pain, pressure, palpitations, Denies lower extremity edema. GASTROINTESTINAL: Denies abdominal pain, constipation, diarrhea, bleeding in the stools, admits to acid reflux. GENITOURINARY: admits to erectile dysfunction. NEUROLOGIC: Denies headaches, dizziness, syncope, weakness  MUSCULOSKELETAL: admits to chronic back pain. ENDOCRINOLOGY: Denies heat or cold intolerance. HEMATOLOGY: Denies easy bleeding or blood transfusion,anemia  DERMATOLOGY: rash in the groin area and the intergluteal folds. PSYCHIATRY: Denies depression, agitation or anxiety.     Past Medical History:   Diagnosis Date    Arthritis     Asthma     Chronic back pain     HA (headache)     Lumbar radiculopathy     Pain and swelling of lower leg 11/16/2021    Varicose veins of right lower extremity with pain 11/16/2021        Past Surgical History:   Procedure Laterality Date    APPENDECTOMY          Family History   Problem Relation Age of Onset    Hypothyroidism Mother     Arthritis Mother     Hypertension Mother     Coronary Art Dis Paternal Grandmother     Heart Attack Paternal Grandmother     Hypertension Father         Social History     Socioeconomic History    Marital status:      Spouse name: Not on file    Number of children: Not on file    Years of education: Not on file    Highest education level: Not on file   Occupational History    Not on file   Tobacco Use    Smoking status: Every Day     Packs/day: 0.00     Types: Cigarettes    Smokeless tobacco: Never    Tobacco comments:     Quit 1yr ago   Vaping Use    Vaping Use: Every day    Substances: Nicotine    Devices: Pre-filled pod   Substance and Sexual Activity    Alcohol use: Yes     Comment: socially    Drug use: Never    Sexual activity: Yes     Partners: Female   Other Topics Concern    Not on file   Social History Narrative    Work at the [employer]     Has 2 children     Has 4 siblings. Not in close contact     Born in [place] Moved to [address] [place] [address] Dr Messi Ludwig: none. Social Determinants of Health     Financial Resource Strain: Low Risk     Difficulty of Paying Living Expenses: Not hard at all   Food Insecurity: No Food Insecurity    Worried About Running Out of Food in the Last Year: Never true    Ran Out of Food in the Last Year: Never true   Transportation Needs: Not on file   Physical Activity: Not on file   Stress: Not on file   Social Connections: Not on file   Intimate Partner Violence: Not on file   Housing Stability: Not on file        Ht 5' 9\" (1.753 m)   Wt 252 lb 6.4 oz (114.5 kg)   BMI 37.27 kg/m²        Physical Exam:    General appearance - alert, well appearing, and in no distress  Mental Status - alert, oriented to person, place, and time  Eyes - pupils equal and reactive, extraocular eye movements intact   Ears - bilateral TM's and external ear canals normal. Cerumen impaction in the right ear. Nose - normal and patent, no erythema, discharge or polyps   Sinuses - Normal paranasal sinuses without tenderness   Throat - mucous membranes moist, pharynx normal without lesions   Neck - supple, no significant adenopathy   Thyroid - thyroid is normal in size without nodules or tenderness    Chest - clear to auscultation, no wheezes, rales or rhonchi, symmetric air entry   Heart - normal rate, regular rhythm, normal S1, S2, no murmurs, rubs, clicks or gallops  Abdomen - soft, nontender, nondistended, no masses or organomegaly   Back exam - full range of motion, no tenderness, palpable spasm or pain on motion   Neurological - alert, oriented, normal speech, no focal findings or movement disorder noted   Musculoskeletal - tenderness to palpation in the lumbar spine and tenderness to palpation in the paraspinal muscles.    Extremities - peripheral pulses normal, no pedal edema, no clubbing or cyanosis   Skin - erythematous papular rash at the base of the scrotum and in the intergluteal folds. There is some tenderness to palpation. Palpable varicose veins in the lower extremities more in the right than the left. Black/hyperpigmented toenails in the first digit. Labs   No results found for: TSHREFLEX  No results found for: TSH    1. Class 2 obesity due to excess calories with body mass index (BMI) of 37.0 to 37.9 in adult, unspecified whether serious comorbidity present  You are more than 100 pounds over your ideal body weight  You have a Body Mass Index (BMI) of over 40  You have a BMI of over 35 and are experiencing obesity related health conditions, such as high blood pressure or diabetes  You are unable to achieve a healthy body weight, even through medically-supervised dieting  - phentermine (ADIPEX-P) 37.5 MG tablet; Take 1 tablet by mouth every morning (before breakfast) for 30 days. Dispense: 30 tablet; Refill: 0  - Marciano Gutpa    2. Change in nail appearance    - Cris Trimble DPM, Podiatry, [facility]            Please note, this report has been partially produced using speech recognition software  and may cause  and /or contain errors related to that system including grammar, punctuation and spelling as well as words and phrases that may seem inappropriate. If there are questions or concerns please feel free to contact me to clarify.

## 2022-08-21 PROCEDURE — 95810 POLYSOM 6/> YRS 4/> PARAM: CPT | Performed by: INTERNAL MEDICINE

## 2022-08-23 ENCOUNTER — TELEPHONE (OUTPATIENT)
Dept: FAMILY MEDICINE CLINIC | Age: 37
End: 2022-08-23

## 2022-08-24 DIAGNOSIS — G47.33 OSA (OBSTRUCTIVE SLEEP APNEA): Primary | ICD-10-CM

## 2022-09-14 PROBLEM — G47.33 OSA (OBSTRUCTIVE SLEEP APNEA): Status: ACTIVE | Noted: 2022-09-14

## 2022-10-23 ENCOUNTER — HOSPITAL ENCOUNTER (OUTPATIENT)
Dept: SLEEP CENTER | Age: 37
Discharge: HOME | End: 2022-10-25
Payer: MEDICAID

## 2022-10-23 PROCEDURE — 95811 POLYSOM 6/>YRS CPAP 4/> PARM: CPT

## 2022-10-24 ENCOUNTER — TELEPHONE (OUTPATIENT)
Dept: FAMILY MEDICINE CLINIC | Age: 37
End: 2022-10-24

## 2022-10-24 PROCEDURE — 95811 POLYSOM 6/>YRS CPAP 4/> PARM: CPT | Performed by: INTERNAL MEDICINE

## 2022-10-24 NOTE — TELEPHONE ENCOUNTER
----- Message from Marlee Barrera sent at 10/24/2022 10:41 AM EDT -----  Subject: Message to Provider    QUESTIONS  Information for Provider? PT HAD SLEEP STUDY 10- AND NEEDS TO KNOW   HOW LONG IT TAKES TO GET A CPAP MACHINE ,PT HAS A DOT COMPLIANCE NEEDS TO   KNOW ASAP   ---------------------------------------------------------------------------  --------------  Last Kong PMXX  [phone]; OK to leave message on voicemail  ---------------------------------------------------------------------------  --------------  SCRIPT ANSWERS  Relationship to Patient?  Self

## 2022-10-24 NOTE — TELEPHONE ENCOUNTER
Just had it yesterday. Let patient know that pulmonologist needs to read it in order to know his settings on the CPAP machine. Will wait for the results.

## 2022-11-02 DIAGNOSIS — G47.33 OSA (OBSTRUCTIVE SLEEP APNEA): ICD-10-CM

## 2022-11-02 NOTE — TELEPHONE ENCOUNTER
[facility]  Subject: Message to Provider     QUESTIONS   Information for Provider? Giuseppe with [facility] Patient Advocate - calling in to   inquire on the status of the patient CPAP machine. Patient contacted them   to request the information. Allie Mitchell is requesting the PCP office follow up   with the patient regarding update of information.   ---------------------------------------------------------------------------   --------------   Wilver WATTERS   [phone]; OK to leave message on voicemail   ---------------------------------------------------------------------------   --------------   SCRIPT ANSWERS   Relationship to Patient? Third Party   Third Party Type? Insurance? Representative Name?  [facility] Patient Advocate

## 2022-11-04 NOTE — TELEPHONE ENCOUNTER
Lvm letting pt know that we received the cpap titration study results yesterday and we will send it to our referral specialist to process.  Cb for questions

## 2022-11-07 ENCOUNTER — TELEMEDICINE (OUTPATIENT)
Dept: FAMILY MEDICINE CLINIC | Age: 37
End: 2022-11-07
Payer: MEDICAID

## 2022-11-07 DIAGNOSIS — N52.9 ERECTILE DYSFUNCTION, UNSPECIFIED ERECTILE DYSFUNCTION TYPE: ICD-10-CM

## 2022-11-07 DIAGNOSIS — G47.33 OSA (OBSTRUCTIVE SLEEP APNEA): Primary | ICD-10-CM

## 2022-11-07 DIAGNOSIS — E66.09 CLASS 2 OBESITY DUE TO EXCESS CALORIES WITH BODY MASS INDEX (BMI) OF 37.0 TO 37.9 IN ADULT, UNSPECIFIED WHETHER SERIOUS COMORBIDITY PRESENT: ICD-10-CM

## 2022-11-07 PROCEDURE — G8484 FLU IMMUNIZE NO ADMIN: HCPCS | Performed by: FAMILY MEDICINE

## 2022-11-07 PROCEDURE — G8417 CALC BMI ABV UP PARAM F/U: HCPCS | Performed by: FAMILY MEDICINE

## 2022-11-07 PROCEDURE — G8427 DOCREV CUR MEDS BY ELIG CLIN: HCPCS | Performed by: FAMILY MEDICINE

## 2022-11-07 PROCEDURE — 4004F PT TOBACCO SCREEN RCVD TLK: CPT | Performed by: FAMILY MEDICINE

## 2022-11-07 PROCEDURE — 99214 OFFICE O/P EST MOD 30 MIN: CPT | Performed by: FAMILY MEDICINE

## 2022-11-07 RX ORDER — TADALAFIL 20 MG/1
20 TABLET ORAL DAILY PRN
Qty: 10 TABLET | Refills: 1 | Status: SHIPPED | OUTPATIENT
Start: 2022-11-07

## 2022-11-07 SDOH — ECONOMIC STABILITY: FOOD INSECURITY: WITHIN THE PAST 12 MONTHS, YOU WORRIED THAT YOUR FOOD WOULD RUN OUT BEFORE YOU GOT MONEY TO BUY MORE.: NEVER TRUE

## 2022-11-07 SDOH — ECONOMIC STABILITY: FOOD INSECURITY: WITHIN THE PAST 12 MONTHS, THE FOOD YOU BOUGHT JUST DIDN'T LAST AND YOU DIDN'T HAVE MONEY TO GET MORE.: NEVER TRUE

## 2022-11-07 ASSESSMENT — SOCIAL DETERMINANTS OF HEALTH (SDOH): HOW HARD IS IT FOR YOU TO PAY FOR THE VERY BASICS LIKE FOOD, HOUSING, MEDICAL CARE, AND HEATING?: NOT HARD AT ALL

## 2022-11-07 NOTE — PROGRESS NOTES
2022    TELEHEALTH EVALUATION -- Audio/Visual (During [id number] public health emergency)    Due to COVID 19 outbreak, patient's office visit was converted to a virtual visit. Patient was contacted and agreed to proceed with a virtual visit via Telephone Visit  The risks and benefits of converting to a virtual visit were discussed in light of the current infectious disease epidemic. Patient also understood that insurance coverage and co-pays are up to their individual insurance plans. HPI:    Patichichi Buenoan (:  1985) has requested an audio/video evaluation for the following concern(s):    MARIA ANTONIA   Recent diagnosis   Patient reports that he feels sleepy at the wheel   Waiting for his CPAP machine     Erectile dysfunction   Stable   Cialis helps   Needs a refill     GERD   On omeprazole   Well controlled. Obesity   BMI 37.27  Adipex caused unwanted side effects   Would like bariatric surgery       ROS  CONSTITUTIONAL: The patient denies fevers, chills, sweats and body ache. HEENT: Denies headache, blurry vision, eye pain, tinnitus, vertigo,  sore throat, neck or thyroid masses. RESPIRATORY: Denies cough, sputum, hemoptysis. CARDIAC: Denies chest pain, pressure, palpitations, Denies lower extremity edema. GASTROINTESTINAL: Denies abdominal pain, constipation, diarrhea, bleeding in the stools, admits to acid reflux. GENITOURINARY: admits to erectile dysfunction. NEUROLOGIC: Denies headaches, dizziness, syncope, weakness  MUSCULOSKELETAL: admits to chronic back pain. ENDOCRINOLOGY: Denies heat or cold intolerance. HEMATOLOGY: Denies easy bleeding or blood transfusion,anemia  DERMATOLOGY: rash in the groin area and the intergluteal folds. PSYCHIATRY: Denies depression, agitation or anxiety. Prior to Visit Medications    Medication Sig Taking?  Authorizing Provider   tadalafil (CIALIS) 20 MG tablet Take 1 tablet by mouth daily as needed for Erectile Dysfunction Yes Troy Moran MD omeprazole (PRILOSEC) 40 MG delayed release capsule TAKE 1 CAPSULE BY MOUTH EVERY MORNING BEFORE BREAKFAST Yes Silvio Corrigan MD   albuterol (PROVENTIL) (2.5 MG/3ML) 0.083% nebulizer solution Take 3 mLs by nebulization every 6 hours as needed for Wheezing Cover what is under insurance. Yes Silvio Corrigan MD   LIDOCAINE PAIN RELIEF 4 % external patch  Yes Historical Provider, MD   oxymetazoline (12 HOUR NASAL SPRAY) 0.05 % nasal spray 2 sprays by Nasal route 2 times daily Intranasal: Instill 2 to 3 sprays into each nostril twice daily for up to 3 days (maximum dose: 2 doses/24 hours). Yes Hunter Davis MD   Tens Unit MISC by Does not apply route Use as directed for low back pain. Hunter Davis MD   albuterol sulfate HFA (PROVENTIL HFA) 108 (90 Base) MCG/ACT inhaler Inhale 2 puffs into the lungs every 6 hours as needed for Wheezing Cover what is under insurance.   Hunter Davis MD       Social History     Tobacco Use    Smoking status: Every Day     Packs/day: 0.00     Types: Cigarettes    Smokeless tobacco: Never    Tobacco comments:     Quit 1yr ago   Vaping Use    Vaping Use: Every day    Substances: Nicotine    Devices: Pre-filled pod   Substance Use Topics    Alcohol use: Yes     Comment: socially    Drug use: Never        Allergies   Allergen Reactions    Aspirin Hives and Other (See Comments)   ,   Past Medical History:   Diagnosis Date    Arthritis     Asthma     Chronic back pain     HA (headache)     Lumbar radiculopathy     MARIA ANTONIA (obstructive sleep apnea)     Pain and swelling of lower leg 11/16/2021    Varicose veins of right lower extremity with pain 11/16/2021   ,   Past Surgical History:   Procedure Laterality Date    APPENDECTOMY     ,   Social History     Tobacco Use    Smoking status: Every Day     Packs/day: 0.00     Types: Cigarettes    Smokeless tobacco: Never    Tobacco comments:     Quit 1yr ago   Vaping Use    Vaping Use: Every day    Substances: Nicotine    Devices: Pre-filled pod   Substance Use Topics    Alcohol use: Yes     Comment: socially    Drug use: Never   ,   Family History   Problem Relation Age of Onset    Hypothyroidism Mother     Arthritis Mother     Hypertension Mother     Coronary Art Dis Paternal Grandmother     Heart Attack Paternal Grandmother     Hypertension Father    ,   Immunization History   Administered Date(s) Administered    COVID-19, PFIZER PURPLE top, DILUTE for use, (age 15 y+), 30mcg/0.3mL 07/30/2021, 09/02/2021    Influenza, FLUCELVAX, (age 10 mo+), MDCK, PF, 0.5mL 09/27/2021    Tdap (Boostrix, Adacel) 01/01/2020   ,   Health Maintenance   Topic Date Due    Varicella vaccine (1 of 2 - 2-dose childhood series) Never done    Pneumococcal 0-64 years Vaccine (1 - PCV) Never done    COVID-19 Vaccine (3 - Booster for Romo Peter series) 10/28/2021    Flu vaccine (1) 08/01/2022    Depression Screen  05/03/2023    DTaP/Tdap/Td vaccine (2 - Td or Tdap) 01/01/2030    Hepatitis C screen  Completed    HIV screen  Completed    Hepatitis A vaccine  Aged Out    Hib vaccine  Aged Out    Meningococcal (ACWY) vaccine  Aged Out       PHYSICAL EXAMINATION:  [ INSTRUCTIONS:  \"[x]\" Indicates a positive item  \"[]\" Indicates a negative item  -- DELETE ALL ITEMS NOT EXAMINED]  [x] Alert  [x] Oriented to person/place/time    [x] No apparent distress  [] Toxic appearing    [] Face flushed appearing [] Sclera clear  [] Lips are cyanotic      [x] Breathing appears normal  [] Appears tachypneic      [] Rash on visible skin    [] Cranial Nerves II-XII grossly intact    [] Motor grossly intact in visible upper extremities    [] Motor grossly intact in visible lower extremities    [x] Normal Mood  [] Anxious appearing    [] Depressed appearing  [] Confused appearing      [] Poor short term memory  [] Poor long term memory    [] OTHER:      Due to this being a TeleHealth encounter, evaluation of the following organ systems is limited: Vitals/Constitutional/EENT/Resp/CV/GI//MS/Neuro/Skin/Heme-Lymph-Imm. ASSESSMENT/PLAN:  1. Erectile dysfunction, unspecified erectile dysfunction type    - tadalafil (CIALIS) 20 MG tablet; Take 1 tablet by mouth daily as needed for Erectile Dysfunction  Dispense: 10 tablet; Refill: 1    2. MARIA ANTONIA (obstructive sleep apnea)    - Ambulatory Referral to Bariatric Surgery    3. Class 2 obesity due to excess calories with body mass index (BMI) of 37.0 to 37.9 in adult, unspecified whether serious comorbidity present    - Ambulatory Referral to Bariatric Surgery    Return in about 6 months (around 5/7/2023) for routine follow up. An  electronic signature was used to authenticate this note. --Cheryle Sar, MD on 11/7/2022 at 10:59 AM        Pursuant to the emergency declaration under the [address] [place] [address], [facility] waiver authority and the [employer] Act, this Virtual  Visit was conducted, with patient's consent, to reduce the patient's risk of exposure to COVID-19 and provide continuity of care for an established patient. Services were provided through a video synchronous discussion virtually to substitute for in-person clinic visit. Zana Andersen is a 40 y.o. male evaluated via telephone on 11/7/2022 for Sleep Apnea (Pt is concerned about CPAP machine)  . Documentation:  I communicated with the patient and/or health care decision maker about chronic medical conditoins . Details of this discussion including any medical advice provided: yes     Total Time: minutes: 5-10 minutes    Zana Andersen was evaluated through a synchronous (real-time) audio encounter. Patient identification was verified at the start of the visit. He (or guardian if applicable) is aware that this is a billable service, which includes applicable co-pays. This visit was conducted with the patient's (and/or legal guardian's) verbal consent.  He has not had a related appointment within my department in the past 7 days or scheduled within the next 24 hours. The patient was located at Home: [address] [place] [address]. The provider was located at [facility] (Appt Dept): [facility],  [address] [place] [address].     Note: not billable if this call serves to triage the patient into an appointment for the relevant concern    Yris Parish MD

## 2022-11-07 NOTE — LETTER
[facility]  [address] [facility] [address]  Phone: [phone]  Fax: [phone]    Lisbeth Michael MD        November 7, 2022     Patient: Shannan Delatorre   YOB: 1985   Date of Visit: 11/7/2022       To Whom it May Concern:    Shannan Delatorre was seen in my clinic on 11/7/2022 and is an established patient. He was diagnosed with obstructive sleep apnea and requires a CPAP machine. I would not recommend patient drive or operate heavy machinery until he has his CPAP machine. If you have any questions or concerns, please don't hesitate to call.     Sincerely,             Lisbeth Michael MD

## 2023-04-02 NOTE — PROGRESS NOTES
2022    TELEHEALTH EVALUATION -- Audio/Visual (During KHLVT-68 public health emergency)    Due to James Schooner 19 outbreak, patient's office visit was converted to a virtual visit. Patient was contacted and agreed to proceed with a virtual visit via Telephone Visit  The risks and benefits of converting to a virtual visit were discussed in light of the current infectious disease epidemic. Patient also understood that insurance coverage and co-pays are up to their individual insurance plans. HPI:    Vicenta Michelle (:  1985) has requested an audio/video evaluation for the following concern(s):      Patient reports that he was in the ED for low back pain  Patient was in the emergency department couple days ago after having back pain with radiculopathy down the left leg. Patient reports he feels a numbness tingling sensation that goes from his lower back all the way down to his toe. Patient x-rays there that were normal however had x-rays done last year which did show some osteoarthritis of the lumbar spine. Patient works as a , and is unable to sit more than 10 minutes without having pain. In the emergency department, patient was given oxycodone, naproxen, muscle relaxant, lidocaine with some relief of symptoms. Patient reports that the medication helped however was only temporary. Patient reports that the naproxen did not help at all. 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 F/u  Patient reports that the lower back pain has not improved. Is seeing pain management and saw that yesterday. Patient reports that the 300 mg of gabapentin do not help. It was recently increased to 600 mg. Patient denies any urine or stool incontinence. Stopped taking meloxicam due to it causing elevation in blood pressures.   Patient denies any fevers, chills, nausea, vomiting or chest pain, shortness of breath, abdominal pain, urination, Ness Dex  7/10/1931  [id number]  04/02/23    HPI  Patient is confused and gets agitated.  He is having increasing shortness of breath.  His IV fluids were discontinued and he responded well to IV Lasix last night.  He was given IV Ativan.    ROS  As Per HPI    I/O's    Intake/Output Summary (Last 24 hours) at 4/2/2023 0953  Last data filed at 4/2/2023 0100  Gross per 24 hour   Intake 2163.78 ml   Output 60 ml   Net 2103.78 ml       Last Recorded Vitals  Blood pressure (!) 146/86, pulse 67, temperature 97 °F (36.1 °C), temperature source Temporal, resp. rate (!) 24, height 5' 10\" (1.778 m), weight 43.3 kg (95 lb 7.4 oz), SpO2 92 %.  Body mass index is 13.7 kg/m².    Physical Exam  Vitals and nursing note reviewed.   Constitutional:       Appearance: Normal appearance. He is well-developed.   HENT:      Head: Normocephalic.      Neck: Normal range of motion and neck supple.   Eyes:      Pupils: Pupils are equal, round, and reactive to light.   Cardiovascular:      Rate and Rhythm: Normal rate.   Pulmonary:      Effort: Pulmonary effort is normal.      Breath sounds: Normal breath sounds.   Abdominal:      General: Bowel sounds are normal.      Palpations: Abdomen is soft.   Musculoskeletal:         General: Normal range of motion.   Skin:     General: Skin is warm.   Neurological:      General: No focal deficit present.      Deep Tendon Reflexes: Reflexes are normal and symmetric.   Psychiatric:         Behavior: Behavior normal.           Labs  Admission on 03/30/2023   Component Date Value   • Sodium 03/30/2023 128 (A)    • Potassium 03/30/2023 3.3 (A)    • Chloride 03/30/2023 96 (A)    • Carbon Dioxide 03/30/2023 23    • Anion Gap 03/30/2023 12    • Glucose 03/30/2023 110 (A)    • BUN 03/30/2023 24 (A)    • Creatinine 03/30/2023 1.56 (A)    • Glomerular Filtration Ra* 03/30/2023 42 (A)    • BUN/Cr 03/30/2023 15    • Calcium 03/30/2023 8.6    • Bilirubin, Total 03/30/2023 1.0    • GOT/AST 03/30/2023 27    • GPT/ALT  03/30/2023 18    • Alkaline Phosphatase 03/30/2023 95    • Albumin 03/30/2023 2.7 (A)    • Protein, Total 03/30/2023 7.7    • Globulin 03/30/2023 5.0 (A)    • A/G Ratio 03/30/2023 0.5 (A)    • Culture, Blood or Bone M* 03/30/2023 No Growth 2 Days.    • Culture, Blood or Bone M* 03/30/2023 No Growth 2 Days.    • Prothrombin Time 03/30/2023 11.4    • INR 03/30/2023 1.1    • PTT 03/30/2023 30    • Lactate, Venous 03/30/2023 1.7    • Magnesium 03/30/2023 2.1    • Lipase 03/30/2023 413 (A)    • WBC 03/30/2023 10.5    • RBC 03/30/2023 4.98    • HGB 03/30/2023 14.4    • HCT 03/30/2023 42.5    • MCV 03/30/2023 85.3    • MCH 03/30/2023 28.9    • MCHC 03/30/2023 33.9    • RDW-CV 03/30/2023 13.3    • RDW-SD 03/30/2023 41.7    • PLT 03/30/2023 280    • NRBC 03/30/2023 0    • Neutrophil, Percent 03/30/2023 81    • Lymphocytes, Percent 03/30/2023 11    • Mono, Percent 03/30/2023 6    • Eosinophils, Percent 03/30/2023 1    • Basophils, Percent 03/30/2023 0    • Immature Granulocytes 03/30/2023 1    • Absolute Neutrophils 03/30/2023 8.5 (A)    • Absolute Lymphocytes 03/30/2023 1.2    • Absolute Monocytes 03/30/2023 0.6    • Absolute Eosinophils  03/30/2023 0.1    • Absolute Basophils 03/30/2023 0.0    • Absolute Immature Granul* 03/30/2023 0.1    • COLOR, URINALYSIS 03/31/2023 Straw    • APPEARANCE, URINALYSIS 03/31/2023 Clear    • GLUCOSE, URINALYSIS 03/31/2023 Negative    • BILIRUBIN, URINALYSIS 03/31/2023 Negative    • KETONES, URINALYSIS 03/31/2023 Negative    • SPECIFIC GRAVITY, URINAL* 03/31/2023 1.011    • OCCULT BLOOD, URINALYSIS 03/31/2023 Negative    • PH, URINALYSIS 03/31/2023 6.5    • PROTEIN, URINALYSIS 03/31/2023 Trace (A)    • UROBILINOGEN, URINALYSIS 03/31/2023 0.2    • NITRITE, URINALYSIS 03/31/2023 Negative    • LEUKOCYTE ESTERASE, URIN* 03/31/2023 Negative    • SQUAMOUS EPITHELIAL, URI* 03/31/2023 None Seen    • ERYTHROCYTES, URINALYSIS 03/31/2023 1 to 2    • LEUKOCYTES, URINALYSIS 03/31/2023 1 to 5    •  change stools. F/u  Has returned to work   Has been taking medication to help with the pain. Patient is seeing pain management   Paitnent is taking the gabapentin- stopped the gabapentin and it was not helping at 600 mg. (No diffence in medication). Patient is taking naproxen and lidocaine does help     GERD   Needs refill on the omeprazole   Does help with GERD    ED   Takes the Cialis   Not helping would like to increase the dose     ROS  CONSTITUTIONAL: The patient denies fevers, chills, sweats and body ache. HEENT: Denies headache, blurry vision, eye pain, tinnitus, vertigo,  sore throat, neck or thyroid masses. RESPIRATORY: Denies cough, sputum, hemoptysis. CARDIAC: Denies chest pain, pressure, palpitations, Denies lower extremity edema. GASTROINTESTINAL: Denies abdominal pain, constipation, diarrhea, bleeding in the stools, admits to acid reflux. GENITOURINARY: admits to erectile dysfunction. NEUROLOGIC: Denies headaches, dizziness, syncope, weakness  MUSCULOSKELETAL: admits to chronic back pain. ENDOCRINOLOGY: Denies heat or cold intolerance. HEMATOLOGY: Denies easy bleeding or blood transfusion,anemia  DERMATOLOGY: rash in the groin area and the intergluteal folds. PSYCHIATRY: Denies depression, agitation or anxiety. Prior to Visit Medications    Medication Sig Taking? Authorizing Provider   omeprazole (PRILOSEC) 40 MG delayed release capsule TAKE 1 CAPSULE BY MOUTH EVERY MORNING BEFORE BREAKFAST Yes Jie Duran MD   albuterol (PROVENTIL) (2.5 MG/3ML) 0.083% nebulizer solution Take 3 mLs by nebulization every 6 hours as needed for Wheezing Cover what is under insurance. Yes Jie Duran MD   tadalafil (CIALIS) 20 MG tablet Take 1 tablet by mouth daily as needed for Erectile Dysfunction Yes Jie Duran MD   Tens Unit MISC by Does not apply route Use as directed for low back pain.   Jie Duran MD   albuterol sulfate HFA (PROVENTIL HFA) 108 (90 Base) BACTERIA, URINALYSIS 03/31/2023 None Seen    • HYALINE CASTS, URINALYSIS 03/31/2023 None Seen    • Procalcitonin 03/30/2023 0.06    • Sodium 03/31/2023 130 (A)    • Potassium 03/31/2023 4.0    • Chloride 03/31/2023 101    • Carbon Dioxide 03/31/2023 20 (A)    • Anion Gap 03/31/2023 13    • Glucose 03/31/2023 111 (A)    • BUN 03/31/2023 20    • Creatinine 03/31/2023 1.35 (A)    • Glomerular Filtration Ra* 03/31/2023 50 (A)    • BUN/Cr 03/31/2023 15    • Calcium 03/31/2023 8.5    • Ventricular Rate EKG/Min* 03/30/2023 72    • Atrial Rate (BPM) 03/30/2023 72    • TN-Interval (MSEC) 03/30/2023 208    • QRS-Interval (MSEC) 03/30/2023 68    • QT-Interval (MSEC) 03/30/2023 424    • QTc 03/30/2023 464    • P Axis (Degrees) 03/30/2023 50    • R Axis (Degrees) 03/30/2023 5    • T Axis (Degrees) 03/30/2023 16    • REPORT TEXT 03/30/2023                      Value:Normal sinus rhythm  Left ventricular hypertrophy  with repolarization abnormality  Abnormal ECG  When compared with ECG of  20-MAR-2023 09:09,  Non-specific change in ST segment in  Anterior leads  Nonspecific T wave abnormality, improved in  Inferior leads  Confirmed by MARVEL VALDOVINOS MD ([id number]) on 3/31/2023 5:36:34 PM     • Sodium 04/01/2023 132 (A)    • Potassium 04/01/2023 4.3    • Chloride 04/01/2023 102    • Carbon Dioxide 04/01/2023 16 (A)    • Anion Gap 04/01/2023 18    • Glucose 04/01/2023 175 (A)    • BUN 04/01/2023 21 (A)    • Creatinine 04/01/2023 1.31 (A)    • Glomerular Filtration Ra* 04/01/2023 51 (A)    • BUN/Cr 04/01/2023 16    • Calcium 04/01/2023 8.4           Imaging        Assessment & Plan      Active Hospital Problems    Diagnosis    • Pneumonia due to COVID-19 virus    • Hyponatremia    • Essential hypertension    • Protein-calorie malnutrition, severe (CMD)    • Chronic constipation    • Anxiety disorder      We will give IV Lasix 1 more time.  We will repeat labs tomorrow.  We will continue with IV antibiotics.  We will discontinue  MCG/ACT inhaler Inhale 2 puffs into the lungs every 6 hours as needed for Wheezing Cover what is under insurance. Marcelle Nunez MD   LIDOCAINE PAIN RELIEF 4 % external patch   Historical Provider, MD   oxymetazoline (12 HOUR NASAL SPRAY) 0.05 % nasal spray 2 sprays by Nasal route 2 times daily Intranasal: Instill 2 to 3 sprays into each nostril twice daily for up to 3 days (maximum dose: 2 doses/24 hours).   Marcelle Nunez MD       Social History     Tobacco Use    Smoking status: Every Day     Packs/day: 0.00     Types: Cigarettes    Smokeless tobacco: Never    Tobacco comments:     Quit 1yr ago   Vaping Use    Vaping Use: Every day    Substances: Nicotine    Devices: Pre-filled pod   Substance Use Topics    Alcohol use: Yes     Comment: socially    Drug use: Never        Allergies   Allergen Reactions    Aspirin Hives and Other (See Comments)   ,   Past Medical History:   Diagnosis Date    Arthritis     Asthma     Chronic back pain     HA (headache)     Lumbar radiculopathy     Pain and swelling of lower leg 11/16/2021    Varicose veins of right lower extremity with pain 11/16/2021   ,   Past Surgical History:   Procedure Laterality Date    APPENDECTOMY     ,   Social History     Tobacco Use    Smoking status: Every Day     Packs/day: 0.00     Types: Cigarettes    Smokeless tobacco: Never    Tobacco comments:     Quit 1yr ago   Vaping Use    Vaping Use: Every day    Substances: Nicotine    Devices: Pre-filled pod   Substance Use Topics    Alcohol use: Yes     Comment: socially    Drug use: Never   ,   Family History   Problem Relation Age of Onset    Hypothyroidism Mother     Arthritis Mother     Hypertension Mother     Coronary Art Dis Paternal Grandmother     Heart Attack Paternal Grandmother     Hypertension Father    ,   Immunization History   Administered Date(s) Administered    COVID-19, PFIZER PURPLE top, DILUTE for use, (age 15 y+), 30mcg/0.3mL 07/30/2021, 09/02/2021    Influenza, MDCK Samira Avery, IM, PF (Flucelvax 2 yrs and older) 09/27/2021    Tdap (Boostrix, Adacel) 01/01/2020   ,   Health Maintenance   Topic Date Due    Varicella vaccine (1 of 2 - 2-dose childhood series) Never done    Pneumococcal 0-64 years Vaccine (1 - PCV) Never done    COVID-19 Vaccine (3 - Booster for Pfizer series) 02/02/2022    Flu vaccine (1) 09/01/2022    Depression Screen  05/03/2023    DTaP/Tdap/Td vaccine (2 - Td or Tdap) 01/01/2030    Hepatitis C screen  Completed    HIV screen  Completed    Hepatitis A vaccine  Aged Out    Hepatitis B vaccine  Aged Out    Hib vaccine  Aged Out    Meningococcal (ACWY) vaccine  Aged Out       PHYSICAL EXAMINATION:  [ INSTRUCTIONS:  \"[x]\" Indicates a positive item  \"[]\" Indicates a negative item  -- DELETE ALL ITEMS NOT EXAMINED]  [x] Alert  [x] Oriented to person/place/time    [x] No apparent distress  [] Toxic appearing    [] Face flushed appearing [] Sclera clear  [] Lips are cyanotic      [x] Breathing appears normal  [] Appears tachypneic      [] Rash on visible skin    [] Cranial Nerves II-XII grossly intact    [] Motor grossly intact in visible upper extremities    [] Motor grossly intact in visible lower extremities    [x] Normal Mood  [] Anxious appearing    [] Depressed appearing  [] Confused appearing      [] Poor short term memory  [] Poor long term memory    [] OTHER:      Due to this being a TeleHealth encounter, evaluation of the following organ systems is limited: Vitals/Constitutional/EENT/Resp/CV/GI//MS/Neuro/Skin/Heme-Lymph-Imm. ASSESSMENT/PLAN:  1. Gastroesophageal reflux disease without esophagitis    - omeprazole (PRILOSEC) 40 MG delayed release capsule; TAKE 1 CAPSULE BY MOUTH EVERY MORNING BEFORE BREAKFAST  Dispense: 30 capsule; Refill: 5    2. Shortness of breath    - albuterol (PROVENTIL) (2.5 MG/3ML) 0.083% nebulizer solution; Take 3 mLs by nebulization every 6 hours as needed for Wheezing Cover what is under insurance. Dispense: 120 each; Refill: 3    3. Solu-Medrol to avoid steroid psychosis.  Plan of management was discussed with patient and nursing staff at bedside.  He is full DNR now.  I had a long discussion with patient's daughter and son-in-law at bedside yesterday.  Possible discharge to SNF tomorrow if he remains stable.  Overall his condition has worsened since admission and family understands that.   Erectile dysfunction, unspecified erectile dysfunction type    - tadalafil (CIALIS) 20 MG tablet; Take 1 tablet by mouth daily as needed for Erectile Dysfunction  Dispense: 10 tablet; Refill: 1    No follow-ups on file. An  electronic signature was used to authenticate this note. --Bladimir Triana MD on 8/12/2022 at 3:01 PM        Pursuant to the emergency declaration under the [facility] [place] [address] waiver authority and the [employer] Act, this Virtual  Visit was conducted, with patient's consent, to reduce the patient's risk of exposure to COVID-19 and provide continuity of care for an established patient. Services were provided through a video synchronous discussion virtually to substitute for in-person clinic visit. Veronica Wright is a 40 y.o. male evaluated via telephone on 8/12/2022 for No chief complaint on file. .        Documentation:  I communicated with the patient and/or health care decision maker about chronic medical condistions . Details of this discussion including any medical advice provided: yes     Total Time: minutes: 11-20 minutes    Veronica Wright was evaluated through a synchronous (real-time) audio encounter. Patient identification was verified at the start of the visit. He (or guardian if applicable) is aware that this is a billable service, which includes applicable co-pays. This visit was conducted with the patient's (and/or legal guardian's) verbal consent. He has not had a related appointment within my department in the past 7 days or scheduled within the next 24 hours. The patient was located at Home: [address] [place] [address]. The provider was located at [facility] (Appt Dept): [address] [place] [address],  [address] [place] [address].     Note: not billable if this call serves to triage the patient into an appointment for the relevant concern    Bladimir Triana MD

## 2023-06-25 ENCOUNTER — HOSPITAL ENCOUNTER (EMERGENCY)
Age: 38
Discharge: HOME | End: 2023-06-25
Payer: COMMERCIAL

## 2023-06-25 VITALS
TEMPERATURE: 98.3 F | HEART RATE: 82 BPM | WEIGHT: 250 LBS | HEIGHT: 69 IN | DIASTOLIC BLOOD PRESSURE: 75 MMHG | SYSTOLIC BLOOD PRESSURE: 135 MMHG | RESPIRATION RATE: 18 BRPM | BODY MASS INDEX: 37.03 KG/M2 | OXYGEN SATURATION: 100 %

## 2023-06-25 DIAGNOSIS — L50.9 URTICARIA: Primary | ICD-10-CM

## 2023-06-25 PROCEDURE — 96372 THER/PROPH/DIAG INJ SC/IM: CPT

## 2023-06-25 PROCEDURE — 6360000002 HC RX W HCPCS

## 2023-06-25 PROCEDURE — 99284 EMERGENCY DEPT VISIT MOD MDM: CPT

## 2023-06-25 RX ORDER — TRIAMCINOLONE ACETONIDE 0.25 MG/G
OINTMENT TOPICAL
Qty: 15 G | Refills: 0 | Status: SHIPPED | OUTPATIENT
Start: 2023-06-25 | End: 2023-07-02

## 2023-06-25 RX ORDER — PREDNISONE 50 MG/1
50 TABLET ORAL DAILY
Qty: 5 TABLET | Refills: 0 | Status: SHIPPED | OUTPATIENT
Start: 2023-06-25 | End: 2023-06-30

## 2023-06-25 RX ORDER — TRIAMCINOLONE ACETONIDE 40 MG/ML
40 INJECTION, SUSPENSION INTRA-ARTICULAR; INTRAMUSCULAR ONCE
Status: COMPLETED | OUTPATIENT
Start: 2023-06-25 | End: 2023-06-25

## 2023-06-25 RX ADMIN — TRIAMCINOLONE ACETONIDE 40 MG: 40 INJECTION, SUSPENSION INTRA-ARTICULAR; INTRAMUSCULAR at 16:41

## 2023-06-25 ASSESSMENT — PAIN - FUNCTIONAL ASSESSMENT
PAIN_FUNCTIONAL_ASSESSMENT: NONE - DENIES PAIN
PAIN_FUNCTIONAL_ASSESSMENT: NONE - DENIES PAIN

## 2023-06-25 ASSESSMENT — ENCOUNTER SYMPTOMS
SORE THROAT: 0
COUGH: 0
CHOKING: 0
SHORTNESS OF BREATH: 0
ABDOMINAL DISTENTION: 0
VOMITING: 0
RHINORRHEA: 0
ABDOMINAL PAIN: 0
DIARRHEA: 0
NAUSEA: 0

## 2023-11-17 ENCOUNTER — OFFICE VISIT (OUTPATIENT)
Dept: PRIMARY CARE | Facility: CLINIC | Age: 38
End: 2023-11-17
Payer: COMMERCIAL

## 2023-11-17 VITALS
HEIGHT: 69 IN | WEIGHT: 264 LBS | SYSTOLIC BLOOD PRESSURE: 118 MMHG | RESPIRATION RATE: 16 BRPM | TEMPERATURE: 98.7 F | OXYGEN SATURATION: 98 % | BODY MASS INDEX: 39.1 KG/M2 | HEART RATE: 76 BPM | DIASTOLIC BLOOD PRESSURE: 88 MMHG

## 2023-11-17 DIAGNOSIS — R68.82 DECREASED LIBIDO: ICD-10-CM

## 2023-11-17 DIAGNOSIS — G47.33 OBSTRUCTIVE SLEEP APNEA: Primary | ICD-10-CM

## 2023-11-17 DIAGNOSIS — I83.811 VARICOSE VEINS OF LEG WITH PAIN, RIGHT: ICD-10-CM

## 2023-11-17 DIAGNOSIS — N52.9 ERECTILE DYSFUNCTION, UNSPECIFIED ERECTILE DYSFUNCTION TYPE: ICD-10-CM

## 2023-11-17 DIAGNOSIS — J30.9 ALLERGIC RHINITIS, UNSPECIFIED SEASONALITY, UNSPECIFIED TRIGGER: ICD-10-CM

## 2023-11-17 DIAGNOSIS — R06.09 DOE (DYSPNEA ON EXERTION): ICD-10-CM

## 2023-11-17 DIAGNOSIS — Z00.00 ROUTINE HEALTH MAINTENANCE: ICD-10-CM

## 2023-11-17 DIAGNOSIS — I20.89 EXERTIONAL ANGINA (CMS-HCC): ICD-10-CM

## 2023-11-17 DIAGNOSIS — L50.8 CHRONIC URTICARIA: ICD-10-CM

## 2023-11-17 PROBLEM — S46.912A SHOULDER STRAIN, LEFT, INITIAL ENCOUNTER: Status: ACTIVE | Noted: 2020-09-28

## 2023-11-17 PROBLEM — I87.2 EDEMA OF RIGHT LOWER LEG DUE TO PERIPHERAL VENOUS INSUFFICIENCY: Status: ACTIVE | Noted: 2021-11-15

## 2023-11-17 PROBLEM — M79.669 PAIN AND SWELLING OF LOWER LEG: Status: ACTIVE | Noted: 2021-11-16

## 2023-11-17 PROBLEM — R60.0 EDEMA OF RIGHT LOWER LEG DUE TO PERIPHERAL VENOUS INSUFFICIENCY: Status: ACTIVE | Noted: 2021-11-15

## 2023-11-17 PROBLEM — M46.1 SACROILIITIS (CMS-HCC): Status: ACTIVE | Noted: 2021-11-19

## 2023-11-17 PROBLEM — G47.30 SLEEP APNEA: Status: ACTIVE | Noted: 2023-11-17

## 2023-11-17 PROBLEM — M79.89 PAIN AND SWELLING OF LOWER LEG: Status: ACTIVE | Noted: 2021-11-16

## 2023-11-17 PROBLEM — M47.26 OSTEOARTHRITIS OF SPINE WITH RADICULOPATHY, LUMBAR REGION: Status: ACTIVE | Noted: 2021-10-29

## 2023-11-17 PROCEDURE — 99204 OFFICE O/P NEW MOD 45 MIN: CPT | Performed by: STUDENT IN AN ORGANIZED HEALTH CARE EDUCATION/TRAINING PROGRAM

## 2023-11-17 PROCEDURE — 3008F BODY MASS INDEX DOCD: CPT | Performed by: STUDENT IN AN ORGANIZED HEALTH CARE EDUCATION/TRAINING PROGRAM

## 2023-11-17 PROCEDURE — 93000 ELECTROCARDIOGRAM COMPLETE: CPT | Performed by: STUDENT IN AN ORGANIZED HEALTH CARE EDUCATION/TRAINING PROGRAM

## 2023-11-17 RX ORDER — SILDENAFIL 25 MG/1
25 TABLET, FILM COATED ORAL DAILY PRN
COMMUNITY

## 2023-11-17 ASSESSMENT — ENCOUNTER SYMPTOMS
FEVER: 0
DYSURIA: 0
VOMITING: 0
SHORTNESS OF BREATH: 0
DIARRHEA: 0
DIAPHORESIS: 0
LIGHT-HEADEDNESS: 1
COUGH: 1
NAUSEA: 0
CHILLS: 0
UNEXPECTED WEIGHT CHANGE: 0
DIZZINESS: 0

## 2023-11-17 NOTE — Clinical Note
Can you message him that I ordered a chest xray too? He can get done at any  radiology at his convenience

## 2023-11-17 NOTE — PROGRESS NOTES
"Subjective   Patient ID: Jeremias Weaver is a 38 y.o. male who presents for Establish Care.    Pt is here today to establish care. PMH significant for asthma, right shoulder arthritis and L5-S1 arthritis, SILVER, RLE varicose veins, hand numbness/carpal tunnel, ED    Pt reports diagnosis of sleep apnea. He is on cpap and states that it has not been working for him. Still wakes up tired and unrefreshed.     SOB with lying flat, present for about a year. Also feels shortness of breath when going up 2 flights of stairs. No chest pain going up 2 flights of stairs. He uses nicotine, current vape. No first degree relative with coronary artery disease.     Has bilateral hand numbness, was reportedly concerned for carpal tunnel but never formally tested for this.     Had dark toenail at prior PCP appointment which did resolve.     He has decreased morning erections as of recent. Also having erectile dysfunction.              Review of Systems   Constitutional:  Negative for chills, diaphoresis, fever and unexpected weight change.   HENT:  Negative for hearing loss.    Eyes:  Negative for visual disturbance.   Respiratory:  Positive for cough (for few months). Negative for shortness of breath.         +Orthopnea   Cardiovascular:  Negative for chest pain.   Gastrointestinal:  Negative for diarrhea, nausea and vomiting.   Endocrine: Negative for cold intolerance and heat intolerance.   Genitourinary:  Negative for dysuria.   Neurological:  Positive for light-headedness (If laughing too hard). Negative for dizziness.       Objective   /88   Pulse 76   Temp 37.1 °C (98.7 °F) (Tympanic)   Resp 16   Ht 1.753 m (5' 9\")   Wt 120 kg (264 lb)   SpO2 98%   BMI 38.99 kg/m²     Physical Exam  Vitals reviewed.   Constitutional:       General: He is not in acute distress.     Appearance: Normal appearance.   HENT:      Head: Normocephalic.   Cardiovascular:      Rate and Rhythm: Normal rate and regular rhythm.   Pulmonary:      " Effort: Pulmonary effort is normal. No respiratory distress.      Breath sounds: Normal breath sounds. No wheezing, rhonchi or rales.   Abdominal:      General: There is no distension.   Musculoskeletal:         General: No swelling or deformity.   Skin:     Coloration: Skin is not jaundiced.   Neurological:      Mental Status: He is alert.         Assessment/Plan   Problem List Items Addressed This Visit       Exertional angina    Relevant Medications    sildenafil (Viagra) 25 mg tablet    Other Relevant Orders    Transthoracic Echo (TTE) Complete    Stress Test    ECG 12 lead (Clinic Performed) (Completed)    COBB (dyspnea on exertion)    Relevant Orders    Transthoracic Echo (TTE) Complete    Stress Test    XR chest 2 views    ECG 12 lead (Clinic Performed) (Completed)    Varicose veins of leg with pain, right    Relevant Orders    Referral to Vascular Medicine    Erectile dysfunction    Relevant Orders    Testosterone,Free and Total    Decreased libido    Relevant Orders    Testosterone,Free and Total    Routine health maintenance    Relevant Orders    Lipid Panel    Comprehensive Metabolic Panel    CBC    TSH with reflex to Free T4 if abnormal    Obstructive sleep apnea - Primary    Relevant Orders    Referral to Adult Sleep Medicine    Allergic rhinitis    Chronic urticaria    BMI 38.0-38.9,adult     Dyspnea on exertion  ?anginal symptoms  -CXR ordered  -Echo and stress test ordered-smoking hx, BMI elevated   -EKG unremarkable today  -Labs ordered  -ER for chest pain, heaviness, worsening COBB or sob, neck/arm pain, nausea symptoms  -Advised to try albuterol inhaler for this-could be asthma related and not necessarily cardiac etiology. No signs of HF on exam today.    Right shoulder arthritis  -Uses topical lidocaine PRN for this.  -Was seeing ortho/sports med, stable    L5-S1 arthritis  -Was seeing ortho for this, stable    Erectile dysfunction  Decreased libido  -sees virtual doctor for this, on Viagra, was on  cialis.   -Testosterone ordered, refer to urology if low    Asthma  Rhinitis  -Has albuterol inhaler and nebulizer at home as needed. Let allergy know if using more than 2 times a week.   -Saw allergist but hasn't followed up recently.   -Try flonase and continue antihistamine  -Advised f/u allergy    Chronic urticaria  -Saw allergy at [facility], no improvement with antihistamine    Varicose veins  -Reports saw vascular in past. Saw in 10/21 and told to wear compression stockings, hasn't followed up  -Still having pain. Referred to vascular    SILVER  -Referred to sleep medicine    BMI 38  -Seeing bariatric surgery in 2 weeks    Carpal tunnel  -Reportedly diagnosed by prior PCP  -Stable address at next visit further    Labs ordered  CPE 1 month  F/u sooner PRN

## 2023-11-18 PROBLEM — L50.8 CHRONIC URTICARIA: Status: ACTIVE | Noted: 2023-11-18

## 2023-11-18 PROBLEM — R06.09 DOE (DYSPNEA ON EXERTION): Status: ACTIVE | Noted: 2023-11-18

## 2023-11-18 PROBLEM — R68.82 DECREASED LIBIDO: Status: ACTIVE | Noted: 2023-11-18

## 2023-11-18 PROBLEM — I20.89 EXERTIONAL ANGINA (CMS-HCC): Status: ACTIVE | Noted: 2023-11-18

## 2023-11-18 PROBLEM — G47.33 OBSTRUCTIVE SLEEP APNEA: Status: ACTIVE | Noted: 2023-11-18

## 2023-11-18 PROBLEM — Z00.00 ROUTINE HEALTH MAINTENANCE: Status: ACTIVE | Noted: 2023-11-18

## 2023-11-18 PROBLEM — N52.9 ERECTILE DYSFUNCTION: Status: ACTIVE | Noted: 2023-11-18

## 2023-11-18 PROBLEM — I83.811 VARICOSE VEINS OF LEG WITH PAIN, RIGHT: Status: ACTIVE | Noted: 2023-11-18

## 2023-11-18 PROBLEM — J30.9 ALLERGIC RHINITIS: Status: ACTIVE | Noted: 2023-11-18

## 2023-11-29 ENCOUNTER — TELEPHONE (OUTPATIENT)
Dept: PRIMARY CARE | Facility: CLINIC | Age: 38
End: 2023-11-29

## 2023-11-29 ENCOUNTER — APPOINTMENT (OUTPATIENT)
Dept: PRIMARY CARE | Facility: CLINIC | Age: 38
End: 2023-11-29
Payer: COMMERCIAL

## 2023-11-29 NOTE — PROGRESS NOTES
"[facility]  New Visit Note        HISTORY OF PRESENT ILLNESS     The patient's referring provider is: No ref. provider found    HISTORY OF PRESENT ILLNESS   Jeremias Weaver is a 38 y.o. male who presents to a [facility] for a sleep medicine evaluation with concerns of Consult and Sleep Apnea.     PAST SLEEP HISTORY    Patient has the following sleep-related diagnoses and sleep study results: moderate SILVER with AHI of 20. Titrated to pressure of 9 cm H2O.    CURRENT HISTORY    On today's visit, the patient presents to review sleep test results. He had study due to symptoms of snoring, stopping breathing, daytime sleepiness.    NECK: 17.5\"    Sleep schedule  on weekdays / work days:  Usual Bedtime  ?  Falls asleep around  ?  Wake time  ?  Schedule varies due to work schedule    Average sleep duration ? hours/day    Preferred sleeping position: side    Sleep-related ROS:    Sleep Initiation: no problems going to sleep    Sleep Maintenance: wakes up about 3 times per night    Breathing during sleep: snoring, witnessed apneas, and gasping/choking for air    RLS screen:  Urge to move legs when falling asleep    Sleep-related behaviors:  Sleep paralysis every 3-4 months  Possibly cataplexy - weakness with strong emotion  Rarely acts out dreams    Daytime Symptoms  On awakening patient reports: dry mouth, sore throat    Patient report some daytime symptoms including: sleepiness, irritability, difficulty with focus and memory, drowsy driving    Recreational drug use  Caffeine consumption:  occasional  Alcohol consumption: occasional  Smoking: vapes  Marijuana: never    ESS: 19   ANDREEA: 22      REVIEW OF SYSTEMS     All other systems negative      ALLERGIES AND MEDICATIONS     ALLERGIES  No Known Allergies    MEDICATIONS  Current Outpatient Medications   Medication Sig Dispense Refill    albuterol 90 mcg/actuation aerosol powdr breath activated inhaler Inhale 2 puffs " "every 4 hours if needed for wheezing or shortness of breath.      sildenafil (Viagra) 25 mg tablet Take 1 tablet (25 mg) by mouth once daily as needed for erectile dysfunction.       No current facility-administered medications for this visit.         PAST HISTORY     PAST MEDICAL HISTORY  He  has a past medical history of Allergic, Arthritis, Asthma, GERD (gastroesophageal reflux disease), and Visual impairment.    PAST SURGICAL HISTORY:  Past Surgical History:   Procedure Laterality Date    APPENDECTOMY         FAMILY HISTORY  Family History   Problem Relation Name Age of Onset    Arthritis Mother Mother     Asthma Mother Mother     COPD Mother Mother     Stroke Father Father     Diabetes Paternal Grandmother UNK     Hypertension Paternal Grandmother UNK     Heart disease Paternal Grandmother UNK        SOCIAL HISTORY  He  reports that he quit smoking about 2 years ago. His smoking use included cigarettes. He has a 20.00 pack-year smoking history. He uses smokeless tobacco. He reports that he does not currently use alcohol after a past usage of about 6.0 standard drinks of alcohol per week. He reports that he does not use drugs.       PHYSICAL EXAM     VITAL SIGNS: /81   Pulse 78   Ht 1.753 m (5' 9\")   Wt 116 kg (256 lb)   SpO2 94%   BMI 37.80 kg/m²      CURRENT WEIGHT: [unfilled]  BMI: [unfilled]  PREVIOUS WEIGHTS:  Wt Readings from Last 3 Encounters:   11/30/23 116 kg (256 lb)   11/17/23 120 kg (264 lb)       PHYSICAL EXAM: GENERAL: alert oriented x 3 pleasant and cooperative no acute distress  MODIFIED MILLIGNA SCORE: 2+  MODIFIED MALLAMPATI SCORE: 2+  LATERAL PHARYNGEAL WALL: 2+  UVULA: midline  TONGUE SCALLOPING: normal  NECK EXAM: normal supple no adenopathy    RESULTS/DATA     No results found for: \"IRON\", \"TRANSFERRIN\", \"IRONSAT\", \"TIBC\", \"FERRITIN\"    ASSESSMENT/PLAN     Mr. Weaver is a 38 y.o. male and He was referred to the [facility] for the following " issues:    OBSTRUCTIVE SLEEP APNEA, MODERATE  -Reviewed test results with patient in detail  -Will order new CPAP from [facility] [place] with pressure 5-15 cm H2O  -Discussed mask options and common tolerance issues  -Goal of CPAP includes less daytime sleepiness, less waking at night  -Followup 31-90 days after starting CPAP    OBESITY  -BMI 38 TODAY  -Encouraged weight loss through diet and exercise    ALLERGIC RHINITIS/NASAL CONGESTION  -Start flonase    Follow up 31-90 days after receiving CPAP for compliance

## 2023-11-29 NOTE — TELEPHONE ENCOUNTER
Patient stated in a message he dropped off forms when he was here last week, he needs you to fill it out and send it to his work today.

## 2023-11-29 NOTE — TELEPHONE ENCOUNTER
Patient scheduled this afternoonSpoke to patient today to clarify  form that needs to be completed. Patient explained this is not a DOT physical form. It is a return to work due to him needing a c-pap machine which he now has. His employer asking for form from pcp.    Please advise if this can be completed.    [phone]

## 2023-11-29 NOTE — PROGRESS NOTES
Subjective   Patient ID: Jeremias Weaver is a 38 y.o. male who presents for No chief complaint on file..  HPI  BARIATRIC CONSULT  UNSUCCESSFUL REACHING BY PHONE DUE TO VM FULL (DID PATIENT DO A NP PACKET)   Review of Systems  Allergy/Immunologic:          HIV / AIDS No.  Hepatitis A No.  Hepatitis B No.  Hepatitis C No.  Immunosuppressent drugs No.         HEENT:          Headache negative.         CARDIOLOGY:          History of Hyperlipidemia No.  Last stress test N/A.  Last echocardiogram N/A.  Chest pain No.  High blood pressure No.  Irregular heart beat No.  Known coronary artery disease No.  Pacemaker No.  Palpitations No.         RESPIRATORY:          Hx steroid use No.  ER visits or Hospitalizations for breathing problems No.  Sleep Apnea No.  COBB (dyspnea on exertion) No.  Hx of Asthma/COPD No.         GASTROENTEROLOGY:          Peptic ulcer No, Last EGD N/A, Last UGI N/A.  Colonoscopy  Last Colonoscopy N/A.  Heartburn No.         ENDOCRINOLOGY:          Diabetes No.  Thyroid disorder No.         EXTREMITIES:          Varicose Veins No.  Stasis Ulcers No.  Ankle swelling No.  Personal history DVT No.  Personal history PE No.  Personal history of other thrombolic events No.  Family history of VTE No.  Known genetic bleeding or clotting disorder No.         UROLOGY:          Kidney disease No.  Kidney stones No.  Previous UTIs No.  Urinary incontinence No.         MUSCULOSKELETAL:          Osteoporosis/Osteopenia No.  Arthritis No.  Joint pain No.         SKIN:          Hidradenitis No.  Open skin wounds No.  Rosacea No.  Healing problems No.         PSYCHOLOGY:          Anxiety none.  Depression none.  Eating disorder denies.        Objective   Physical Exam    Assessment/Plan   {Assess/PlanSmartLinks:79686}

## 2023-11-29 NOTE — TELEPHONE ENCOUNTER
I spoke to patient advised Dr. Rodriguez referred him to pulmonology to do the forms,. Pt understood

## 2023-11-30 ENCOUNTER — OFFICE VISIT (OUTPATIENT)
Dept: SLEEP MEDICINE | Facility: CLINIC | Age: 38
End: 2023-11-30
Payer: COMMERCIAL

## 2023-11-30 VITALS
WEIGHT: 256 LBS | DIASTOLIC BLOOD PRESSURE: 81 MMHG | OXYGEN SATURATION: 94 % | SYSTOLIC BLOOD PRESSURE: 122 MMHG | HEART RATE: 78 BPM | HEIGHT: 69 IN | BODY MASS INDEX: 37.92 KG/M2

## 2023-11-30 DIAGNOSIS — J30.89 NON-SEASONAL ALLERGIC RHINITIS, UNSPECIFIED TRIGGER: ICD-10-CM

## 2023-11-30 DIAGNOSIS — R09.81 NASAL CONGESTION: Primary | ICD-10-CM

## 2023-11-30 DIAGNOSIS — G47.33 SLEEP APNEA, OBSTRUCTIVE: ICD-10-CM

## 2023-11-30 PROCEDURE — 99204 OFFICE O/P NEW MOD 45 MIN: CPT | Performed by: PHYSICIAN ASSISTANT

## 2023-11-30 PROCEDURE — 99214 OFFICE O/P EST MOD 30 MIN: CPT | Performed by: PHYSICIAN ASSISTANT

## 2023-11-30 PROCEDURE — 3008F BODY MASS INDEX DOCD: CPT | Performed by: PHYSICIAN ASSISTANT

## 2023-11-30 RX ORDER — FLUTICASONE PROPIONATE 50 MCG
1 SPRAY, SUSPENSION (ML) NASAL DAILY
Qty: 16 G | Refills: 12 | Status: SHIPPED | OUTPATIENT
Start: 2023-11-30 | End: 2024-03-12 | Stop reason: WASHOUT

## 2023-11-30 ASSESSMENT — PATIENT HEALTH QUESTIONNAIRE - PHQ9
1. LITTLE INTEREST OR PLEASURE IN DOING THINGS: NOT AT ALL
2. FEELING DOWN, DEPRESSED OR HOPELESS: NOT AT ALL
SUM OF ALL RESPONSES TO PHQ9 QUESTIONS 1 & 2: 0

## 2023-11-30 ASSESSMENT — SLEEP AND FATIGUE QUESTIONNAIRES
HOW LIKELY ARE YOU TO NOD OFF OR FALL ASLEEP WHILE SITTING AND TALKING TO SOMEONE: SLIGHT CHANCE OF DOZING
HOW LIKELY ARE YOU TO NOD OFF OR FALL ASLEEP WHILE SITTING AND READING: HIGH CHANCE OF DOZING
HOW LIKELY ARE YOU TO NOD OFF OR FALL ASLEEP WHILE SITTING QUIETLY AFTER LUNCH WITHOUT ALCOHOL: HIGH CHANCE OF DOZING
SITING INACTIVE IN A PUBLIC PLACE LIKE A CLASS ROOM OR A MOVIE THEATER: HIGH CHANCE OF DOZING
DIFFICULTY_STAYING_ASLEEP: MODERATE
WAKING_TOO_EARLY: MILD
WORRIED_DISTRESSED_DUE_TO_SLEEP: VERY MUCH NOTICEABLE
DIFFICULTY_FALLING_ASLEEP: SEVERE
HOW LIKELY ARE YOU TO NOD OFF OR FALL ASLEEP WHEN YOU ARE A PASSENGER IN A CAR FOR AN HOUR WITHOUT A BREAK: HIGH CHANCE OF DOZING
SLEEP_PROBLEM_INTERFERES_DAILY_ACTIVITIES: VERY MUCH NOTICEABLE
HOW LIKELY ARE YOU TO NOD OFF OR FALL ASLEEP IN A CAR, WHILE STOPPED FOR A FEW MINUTES IN TRAFFIC: WOULD NEVER DOZE
SATISFACTION_WITH_CURRENT_SLEEP_PATTERN: VERY DISSATISFIED
SLEEP_PROBLEM_NOTICEABLE_TO_OTHERS: VERY MUCH NOTICEABLE
ESS-CHAD TOTAL SCORE: 19
HOW LIKELY ARE YOU TO NOD OFF OR FALL ASLEEP WHILE WATCHING TV: HIGH CHANCE OF DOZING
HOW LIKELY ARE YOU TO NOD OFF OR FALL ASLEEP WHILE LYING DOWN TO REST IN THE AFTERNOON WHEN CIRCUMSTANCES PERMIT: HIGH CHANCE OF DOZING

## 2023-11-30 ASSESSMENT — LIFESTYLE VARIABLES
SKIP TO QUESTIONS 9-10: 0
HOW MANY STANDARD DRINKS CONTAINING ALCOHOL DO YOU HAVE ON A TYPICAL DAY: 3 OR 4
HOW OFTEN DO YOU HAVE A DRINK CONTAINING ALCOHOL: 2-4 TIMES A MONTH
HOW OFTEN DO YOU HAVE SIX OR MORE DRINKS ON ONE OCCASION: MONTHLY
AUDIT-C TOTAL SCORE: 5

## 2023-11-30 ASSESSMENT — PAIN SCALES - GENERAL: PAINLEVEL: 0-NO PAIN

## 2023-11-30 NOTE — PATIENT INSTRUCTIONS
Good seeing you today,    Start flonase spray - 1 spray each nostril - point towards outer part of eyelid    Some mask options to consider    Resmed N30i nasal mask (tube on top of head) - good option if you toss and turn a lot  Resmed N30 nasal mask (tube in front)  Hager eliseo fx nasal pillow mask (tube in front)  Resmed P10 nasal pillow mask (tube in front)  Resmed P30i nasal mask (tube on top of head) - good option if you toss and turn a lot    Follow up 1 month    IMPORTANT PHONE NUMBERS     [phone] ([phone])  [employer] ([employer]): [phone]  For clinical questions and refilling prescriptions: [phone]  Maida Rodriguez (For Jurcevic/Blank): P: [phone]

## 2023-12-01 ENCOUNTER — APPOINTMENT (OUTPATIENT)
Dept: SURGERY | Facility: CLINIC | Age: 38
End: 2023-12-01
Payer: COMMERCIAL

## 2023-12-05 ENCOUNTER — TELEPHONE (OUTPATIENT)
Dept: PRIMARY CARE | Facility: CLINIC | Age: 38
End: 2023-12-05
Payer: COMMERCIAL

## 2023-12-07 ENCOUNTER — APPOINTMENT (OUTPATIENT)
Dept: SLEEP MEDICINE | Facility: CLINIC | Age: 38
End: 2023-12-07
Payer: COMMERCIAL

## 2023-12-15 ENCOUNTER — APPOINTMENT (OUTPATIENT)
Dept: PRIMARY CARE | Facility: CLINIC | Age: 38
End: 2023-12-15
Payer: COMMERCIAL

## 2023-12-21 ENCOUNTER — APPOINTMENT (OUTPATIENT)
Dept: CARDIOLOGY | Facility: CLINIC | Age: 38
End: 2023-12-21
Payer: COMMERCIAL

## 2024-01-02 NOTE — PROGRESS NOTES
[facility]  Followup Virtual Visit Note        Virtual or Telephone Consent  {Complete for a virtual or telephone visit:58753}  {Select who provided consent:85396}    HISTORY OF PRESENT ILLNESS     The patient's referring provider is: No ref. provider found    HISTORY OF PRESENT ILLNESS   Jeremias Weaver is a 38 y.o. male who presents to a [facility] for followup.     The patient  has a past medical history of Allergic, Arthritis, Asthma, GERD (gastroesophageal reflux disease), and Visual impairment..      Current History    On today's visit, the patient reports ***    PREVIOUS VISIT 11/30/2023  OBSTRUCTIVE SLEEP APNEA, MODERATE  -Reviewed test results with patient in detail  -Will order new CPAP from [facility] [place] with pressure 5-15 cm H2O  -Discussed mask options and common tolerance issues  -Goal of CPAP includes less daytime sleepiness, less waking at night  -Followup 31-90 days after starting CPAP    OBESITY  -BMI 38 TODAY  -Encouraged weight loss through diet and exercise    ALLERGIC RHINITIS/NASAL CONGESTION  -Start flonase    Follow up 31-90 days after receiving CPAP for compliance     PAP Adherence:  [employer]:   Pressure: *** cm H2O Mask: ***    A PAP adherence download was obtained and data was reviewed personally today in clinic. (see scanned document in EPIC)  % days >4 hours use: ***  Average use : ***  95% pressure *** cm H2O  95% leak : *** L/min  AHI: ***    RLS Followup:   ***    Sleep Scales:  ESS: 19   ANDREEA ***  FOSQ ***      REVIEW OF SYSTEMS     All other systems negative      ALLERGIES AND MEDICATIONS     ALLERGIES  No Known Allergies    MEDICATIONS: He has a current medication list which includes the following prescription(s): albuterol - Inhale 2 puffs every 4 hours if needed for wheezing or shortness of breath, fluticasone - Administer 1 spray into each nostril once daily. Shake gently. Before first use, prime pump.  "After use, clean tip and replace cap, and sildenafil - Take 1 tablet (25 mg) by mouth once daily as needed for erectile dysfunction.    PAST MEDICAL HISTORY : He  has a past medical history of Allergic, Arthritis, Asthma, GERD (gastroesophageal reflux disease), and Visual impairment.    PAST SURGICAL HISTORY: He  has a past surgical history that includes Appendectomy.     FAMILY HISTORY: No changes since previous visit. Otherwise non-contributory as charted.       SOCIAL HISTORY  He  reports that he quit smoking about 3 years ago. His smoking use included cigarettes. He has a 20.00 pack-year smoking history. He uses smokeless tobacco. He reports that he does not currently use alcohol after a past usage of about 6.0 standard drinks of alcohol per week. He reports that he does not use drugs.       PHYSICAL EXAM     VITAL SIGNS: There were no vitals taken for this visit.     CURRENT WEIGHT: [unfilled]  BMI: [unfilled]  PREVIOUS WEIGHTS:  Wt Readings from Last 3 Encounters:   11/30/23 116 kg (256 lb)   11/17/23 120 kg (264 lb)       Constitutional: Alert and oriented, cooperative, no obvious distress   Neuromuscular: Cranial nerves grossly intact, EOMs intact    RESULTS/DATA     No results found for: \"IRON\", \"TRANSFERRIN\", \"IRONSAT\", \"TIBC\", \"FERRITIN\"      ASSESSMENT/PLAN     Mr. Weaver is a 38 y.o. male and  has a past medical history of Allergic, Arthritis, Asthma, GERD (gastroesophageal reflux disease), and Visual impairment. He returns in followup to the [facility] for the following issues:             "

## 2024-01-04 ENCOUNTER — APPOINTMENT (OUTPATIENT)
Dept: CARDIOLOGY | Facility: CLINIC | Age: 39
End: 2024-01-04
Payer: COMMERCIAL

## 2024-01-04 ENCOUNTER — APPOINTMENT (OUTPATIENT)
Dept: SLEEP MEDICINE | Facility: CLINIC | Age: 39
End: 2024-01-04
Payer: COMMERCIAL

## 2024-01-04 DIAGNOSIS — G47.33 OBSTRUCTIVE SLEEP APNEA SYNDROME: Primary | ICD-10-CM

## 2024-02-22 ENCOUNTER — NUTRITION (OUTPATIENT)
Dept: SURGERY | Facility: CLINIC | Age: 39
End: 2024-02-22
Payer: COMMERCIAL

## 2024-02-22 ENCOUNTER — OFFICE VISIT (OUTPATIENT)
Dept: SURGERY | Facility: CLINIC | Age: 39
End: 2024-02-22
Payer: COMMERCIAL

## 2024-02-22 ENCOUNTER — APPOINTMENT (OUTPATIENT)
Dept: LAB | Facility: LAB | Age: 39
End: 2024-02-22
Payer: COMMERCIAL

## 2024-02-22 VITALS
RESPIRATION RATE: 16 BRPM | SYSTOLIC BLOOD PRESSURE: 143 MMHG | WEIGHT: 262 LBS | HEART RATE: 96 BPM | HEIGHT: 69 IN | BODY MASS INDEX: 38.8 KG/M2 | DIASTOLIC BLOOD PRESSURE: 88 MMHG

## 2024-02-22 DIAGNOSIS — D68.9 COAGULATION DISORDER (MULTI): ICD-10-CM

## 2024-02-22 DIAGNOSIS — E53.8 VITAMIN B12 DEFICIENCY: ICD-10-CM

## 2024-02-22 DIAGNOSIS — Z71.3 ENCOUNTER FOR NUTRITION EVALUATION PRIOR TO BARIATRIC SURGERY: ICD-10-CM

## 2024-02-22 DIAGNOSIS — E63.9 NUTRITIONAL DISORDER: ICD-10-CM

## 2024-02-22 DIAGNOSIS — L50.8 CHRONIC URTICARIA: ICD-10-CM

## 2024-02-22 DIAGNOSIS — E55.9 VITAMIN D DEFICIENCY: ICD-10-CM

## 2024-02-22 DIAGNOSIS — E51.9 THIAMINE DEFICIENCY: ICD-10-CM

## 2024-02-22 DIAGNOSIS — R73.9 HYPERGLYCEMIA: ICD-10-CM

## 2024-02-22 DIAGNOSIS — E07.9 DISEASE OF THYROID GLAND: ICD-10-CM

## 2024-02-22 DIAGNOSIS — K21.9 GERD WITHOUT ESOPHAGITIS: Primary | ICD-10-CM

## 2024-02-22 DIAGNOSIS — J45.20 MILD INTERMITTENT ASTHMA WITHOUT COMPLICATION (HHS-HCC): ICD-10-CM

## 2024-02-22 DIAGNOSIS — E53.8 B12 DEFICIENCY: ICD-10-CM

## 2024-02-22 DIAGNOSIS — D50.8 IRON DEFICIENCY ANEMIA SECONDARY TO INADEQUATE DIETARY IRON INTAKE: ICD-10-CM

## 2024-02-22 DIAGNOSIS — G47.33 OSA (OBSTRUCTIVE SLEEP APNEA): ICD-10-CM

## 2024-02-22 DIAGNOSIS — E61.0 COPPER DEFICIENCY: ICD-10-CM

## 2024-02-22 PROCEDURE — 3008F BODY MASS INDEX DOCD: CPT | Performed by: INTERNAL MEDICINE

## 2024-02-22 PROCEDURE — 99204 OFFICE O/P NEW MOD 45 MIN: CPT | Performed by: INTERNAL MEDICINE

## 2024-02-22 PROCEDURE — 4004F PT TOBACCO SCREEN RCVD TLK: CPT | Performed by: INTERNAL MEDICINE

## 2024-02-22 RX ORDER — DIPHENHYDRAMINE HCL 25 MG
25 TABLET ORAL NIGHTLY PRN
COMMUNITY

## 2024-02-22 RX ORDER — LORATADINE 10 MG/1
10 TABLET ORAL DAILY
COMMUNITY
End: 2024-03-12 | Stop reason: ALTCHOICE

## 2024-02-22 RX ORDER — FAMOTIDINE 20 MG/1
20 TABLET, FILM COATED ORAL NIGHTLY PRN
COMMUNITY

## 2024-02-22 ASSESSMENT — PAIN SCALES - GENERAL: PAINLEVEL: 0-NO PAIN

## 2024-02-22 ASSESSMENT — ENCOUNTER SYMPTOMS
NAUSEA: 0
BACK PAIN: 1
COUGH: 0
ROS GI COMMENTS: HEARTBURN
CONSTIPATION: 0
DIARRHEA: 0
ARTHRALGIAS: 1
SHORTNESS OF BREATH: 0
LOSS OF SENSATION IN FEET: 0
DEPRESSION: 0
OCCASIONAL FEELINGS OF UNSTEADINESS: 0
ABDOMINAL PAIN: 0
DIZZINESS: 0
PALPITATIONS: 0

## 2024-02-22 ASSESSMENT — PATIENT HEALTH QUESTIONNAIRE - PHQ9
2. FEELING DOWN, DEPRESSED OR HOPELESS: NOT AT ALL
SUM OF ALL RESPONSES TO PHQ9 QUESTIONS 1 AND 2: 0
1. LITTLE INTEREST OR PLEASURE IN DOING THINGS: NOT AT ALL

## 2024-02-22 NOTE — PROGRESS NOTES
"Subjective   Patient ID: Jeremias Weaver is a 38 y.o. male who presents for [facility]. Patient has been trying to lose weight for many years by following different diets like Weight Watchers, lost some weight but did not maintain. 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 At work he eats fast food. He cooks when he's at home, rice, beans, chicken, pork. Snacks on sweets. Drinks mostly water, soda. Regarding exercise, his exercise is limited due to shoulder and back pain. Previously walked outside while at truck stops. Uses CPAP (~1 year old) nightly for about four hours. Had EKG because he c/o shortness of breath due to asthma. Denies personal and family hx of blood clots. Endorses heartburn.    Diagnostics Reviewed: 11/2023 EKG NSR  Labs Reviewed:         Review of Systems   Respiratory:  Negative for cough and shortness of breath.    Cardiovascular:  Negative for chest pain and palpitations.   Gastrointestinal:  Negative for abdominal pain, constipation, diarrhea and nausea.        Heartburn   Musculoskeletal:  Positive for arthralgias (shoulder) and back pain.   Neurological:  Negative for dizziness.       Objective   /88   Pulse 96   Resp 16   Ht 1.753 m (5' 9\")   Wt 119 kg (262 lb)   BMI 38.69 kg/m²     Physical Exam  Constitutional:       General: He is not in acute distress.     Appearance: He is obese.   HENT:      Mouth/Throat:      Comments: Dentition WNL  Cardiovascular:      Rate and Rhythm: Normal rate and regular rhythm.      Heart sounds: Normal heart sounds.   Pulmonary:      Breath sounds: Normal breath sounds.   Abdominal:      General: Bowel sounds are normal.      Palpations: Abdomen is soft.      Tenderness: There is no abdominal tenderness.   Musculoskeletal:      Cervical back: No tenderness.      Right lower leg: No edema.      Left lower leg: No edema.   Lymphadenopathy:      Cervical: No cervical adenopathy.   Skin:     General: Skin is warm.      Findings: No " erythema.   Neurological:      General: No focal deficit present.      Mental Status: He is alert and oriented to person, place, and time.      Gait: Gait is intact.   Psychiatric:         Mood and Affect: Mood normal.         Behavior: Behavior normal.         Assessment/Plan   Diagnoses and all orders for this visit:  SILVER (obstructive sleep apnea)  -     CBC and Auto Differential; Future  -     aPTT; Future  -     Hemoglobin A1C; Future  -     Folate; Future  -     Ferritin; Future  -     Comprehensive Metabolic Panel; Future  -     TSH with reflex to Free T4 if abnormal; Future  -     Lipid Panel; Future  -     Protime-INR; Future  -     Vitamin D 25-Hydroxy,Total (for eval of Vitamin D levels); Future  -     Vitamin B12; Future  -     Parathyroid Hormone, Intact; Future  -     Vitamin B1, Whole Blood; Future  -     Copper, Blood; Future  -     Vitamin A; Future  -     Vitamin E; Future  -     Zinc, Serum or Plasma; Future  -     Iron and TIBC; Future  Mild intermittent asthma without complication  -     CBC and Auto Differential; Future  -     aPTT; Future  -     Hemoglobin A1C; Future  -     Folate; Future  -     Ferritin; Future  -     Comprehensive Metabolic Panel; Future  -     TSH with reflex to Free T4 if abnormal; Future  -     Lipid Panel; Future  -     Protime-INR; Future  -     Vitamin D 25-Hydroxy,Total (for eval of Vitamin D levels); Future  -     Vitamin B12; Future  -     Parathyroid Hormone, Intact; Future  -     Vitamin B1, Whole Blood; Future  -     Copper, Blood; Future  -     Vitamin A; Future  -     Vitamin E; Future  -     Zinc, Serum or Plasma; Future  -     Iron and TIBC; Future  Chronic urticaria  -     CBC and Auto Differential; Future  -     aPTT; Future  -     Hemoglobin A1C; Future  -     Folate; Future  -     Ferritin; Future  -     Comprehensive Metabolic Panel; Future  -     TSH with reflex to Free T4 if abnormal; Future  -     Lipid Panel; Future  -     Protime-INR; Future  -      Vitamin D 25-Hydroxy,Total (for eval of Vitamin D levels); Future  -     Vitamin B12; Future  -     Parathyroid Hormone, Intact; Future  -     Vitamin B1, Whole Blood; Future  -     Copper, Blood; Future  -     Vitamin A; Future  -     Vitamin E; Future  -     Zinc, Serum or Plasma; Future  -     Iron and TIBC; Future  Coagulation disorder (CMS/HCC)  -     CBC and Auto Differential; Future  -     aPTT; Future  -     Hemoglobin A1C; Future  -     Folate; Future  -     Ferritin; Future  -     Comprehensive Metabolic Panel; Future  -     TSH with reflex to Free T4 if abnormal; Future  -     Lipid Panel; Future  -     Protime-INR; Future  -     Vitamin D 25-Hydroxy,Total (for eval of Vitamin D levels); Future  -     Vitamin B12; Future  -     Parathyroid Hormone, Intact; Future  -     Vitamin B1, Whole Blood; Future  -     Copper, Blood; Future  -     Vitamin A; Future  -     Vitamin E; Future  -     Zinc, Serum or Plasma; Future  -     Iron and TIBC; Future  Copper deficiency  -     CBC and Auto Differential; Future  -     aPTT; Future  -     Hemoglobin A1C; Future  -     Folate; Future  -     Ferritin; Future  -     Comprehensive Metabolic Panel; Future  -     TSH with reflex to Free T4 if abnormal; Future  -     Lipid Panel; Future  -     Protime-INR; Future  -     Vitamin D 25-Hydroxy,Total (for eval of Vitamin D levels); Future  -     Vitamin B12; Future  -     Parathyroid Hormone, Intact; Future  -     Vitamin B1, Whole Blood; Future  -     Copper, Blood; Future  -     Vitamin A; Future  -     Vitamin E; Future  -     Zinc, Serum or Plasma; Future  -     Iron and TIBC; Future  Encounter for nutrition evaluation prior to bariatric surgery  -     CBC and Auto Differential; Future  -     aPTT; Future  -     Hemoglobin A1C; Future  -     Folate; Future  -     Ferritin; Future  -     Comprehensive Metabolic Panel; Future  -     TSH with reflex to Free T4 if abnormal; Future  -     Lipid Panel; Future  -      Protime-INR; Future  -     Vitamin D 25-Hydroxy,Total (for eval of Vitamin D levels); Future  -     Vitamin B12; Future  -     Parathyroid Hormone, Intact; Future  -     Vitamin B1, Whole Blood; Future  -     Copper, Blood; Future  -     Vitamin A; Future  -     Vitamin E; Future  -     Zinc, Serum or Plasma; Future  -     Iron and TIBC; Future  Hyperglycemia  -     CBC and Auto Differential; Future  -     aPTT; Future  -     Hemoglobin A1C; Future  -     Folate; Future  -     Ferritin; Future  -     Comprehensive Metabolic Panel; Future  -     TSH with reflex to Free T4 if abnormal; Future  -     Lipid Panel; Future  -     Protime-INR; Future  -     Vitamin D 25-Hydroxy,Total (for eval of Vitamin D levels); Future  -     Vitamin B12; Future  -     Parathyroid Hormone, Intact; Future  -     Vitamin B1, Whole Blood; Future  -     Copper, Blood; Future  -     Vitamin A; Future  -     Vitamin E; Future  -     Zinc, Serum or Plasma; Future  -     Iron and TIBC; Future  B12 deficiency  -     CBC and Auto Differential; Future  -     aPTT; Future  -     Hemoglobin A1C; Future  -     Folate; Future  -     Ferritin; Future  -     Comprehensive Metabolic Panel; Future  -     TSH with reflex to Free T4 if abnormal; Future  -     Lipid Panel; Future  -     Protime-INR; Future  -     Vitamin D 25-Hydroxy,Total (for eval of Vitamin D levels); Future  -     Vitamin B12; Future  -     Parathyroid Hormone, Intact; Future  -     Vitamin B1, Whole Blood; Future  -     Copper, Blood; Future  -     Vitamin A; Future  -     Vitamin E; Future  -     Zinc, Serum or Plasma; Future  -     Iron and TIBC; Future  Disease of thyroid gland  -     CBC and Auto Differential; Future  -     aPTT; Future  -     Hemoglobin A1C; Future  -     Folate; Future  -     Ferritin; Future  -     Comprehensive Metabolic Panel; Future  -     TSH with reflex to Free T4 if abnormal; Future  -     Lipid Panel; Future  -     Protime-INR; Future  -     Vitamin D  25-Hydroxy,Total (for eval of Vitamin D levels); Future  -     Vitamin B12; Future  -     Parathyroid Hormone, Intact; Future  -     Vitamin B1, Whole Blood; Future  -     Copper, Blood; Future  -     Vitamin A; Future  -     Vitamin E; Future  -     Zinc, Serum or Plasma; Future  -     Iron and TIBC; Future  Iron deficiency anemia secondary to inadequate dietary iron intake  -     CBC and Auto Differential; Future  -     aPTT; Future  -     Hemoglobin A1C; Future  -     Folate; Future  -     Ferritin; Future  -     Comprehensive Metabolic Panel; Future  -     TSH with reflex to Free T4 if abnormal; Future  -     Lipid Panel; Future  -     Protime-INR; Future  -     Vitamin D 25-Hydroxy,Total (for eval of Vitamin D levels); Future  -     Vitamin B12; Future  -     Parathyroid Hormone, Intact; Future  -     Vitamin B1, Whole Blood; Future  -     Copper, Blood; Future  -     Vitamin A; Future  -     Vitamin E; Future  -     Zinc, Serum or Plasma; Future  -     Iron and TIBC; Future  Nutritional disorder  -     CBC and Auto Differential; Future  -     aPTT; Future  -     Hemoglobin A1C; Future  -     Folate; Future  -     Ferritin; Future  -     Comprehensive Metabolic Panel; Future  -     TSH with reflex to Free T4 if abnormal; Future  -     Lipid Panel; Future  -     Protime-INR; Future  -     Vitamin D 25-Hydroxy,Total (for eval of Vitamin D levels); Future  -     Vitamin B12; Future  -     Parathyroid Hormone, Intact; Future  -     Vitamin B1, Whole Blood; Future  -     Copper, Blood; Future  -     Vitamin A; Future  -     Vitamin E; Future  -     Zinc, Serum or Plasma; Future  -     Iron and TIBC; Future  Thiamine deficiency  -     CBC and Auto Differential; Future  -     aPTT; Future  -     Hemoglobin A1C; Future  -     Folate; Future  -     Ferritin; Future  -     Comprehensive Metabolic Panel; Future  -     TSH with reflex to Free T4 if abnormal; Future  -     Lipid Panel; Future  -     Protime-INR; Future  -      Vitamin D 25-Hydroxy,Total (for eval of Vitamin D levels); Future  -     Vitamin B12; Future  -     Parathyroid Hormone, Intact; Future  -     Vitamin B1, Whole Blood; Future  -     Copper, Blood; Future  -     Vitamin A; Future  -     Vitamin E; Future  -     Zinc, Serum or Plasma; Future  -     Iron and TIBC; Future  Vitamin B12 deficiency  -     CBC and Auto Differential; Future  -     aPTT; Future  -     Hemoglobin A1C; Future  -     Folate; Future  -     Ferritin; Future  -     Comprehensive Metabolic Panel; Future  -     TSH with reflex to Free T4 if abnormal; Future  -     Lipid Panel; Future  -     Protime-INR; Future  -     Vitamin D 25-Hydroxy,Total (for eval of Vitamin D levels); Future  -     Vitamin B12; Future  -     Parathyroid Hormone, Intact; Future  -     Vitamin B1, Whole Blood; Future  -     Copper, Blood; Future  -     Vitamin A; Future  -     Vitamin E; Future  -     Zinc, Serum or Plasma; Future  -     Iron and TIBC; Future  Vitamin D deficiency  -     CBC and Auto Differential; Future  -     aPTT; Future  -     Hemoglobin A1C; Future  -     Folate; Future  -     Ferritin; Future  -     Comprehensive Metabolic Panel; Future  -     TSH with reflex to Free T4 if abnormal; Future  -     Lipid Panel; Future  -     Protime-INR; Future  -     Vitamin D 25-Hydroxy,Total (for eval of Vitamin D levels); Future  -     Vitamin B12; Future  -     Parathyroid Hormone, Intact; Future  -     Vitamin B1, Whole Blood; Future  -     Copper, Blood; Future  -     Vitamin A; Future  -     Vitamin E; Future  -     Zinc, Serum or Plasma; Future  -     Iron and TIBC; Future  GERD without esophagitis  -     H. Pylori Antigen, Stool; Future      Scribe Attestation  By signing my name below, I, Ida Bryant   attest that this documentation has been prepared under the direction and in the presence of Rhonda Dubois MD.

## 2024-02-22 NOTE — PROGRESS NOTES
"Initial Medical Weight Loss Appointment (MWL 1)     Starting Weight: 262 lb / BMI: 38.69     Diet Experience: Jeremias reports he has thought about surgery for awhile now. Jeremias reports that in the past he has tried to overall eat healthier and exercise more but has not seen any weight loss success. Jeremias reports that due to his occupation of being a , it is hard to follow a consistent meal routine and he relies on fast food for most of his meals.   Pt Seeking: RYGB   Pertinent Co-morbidities: sleep apnea. Pt reports he uses a CPAP consistently.   Current Daily Intake:    When pt is working () he reports that he will sometimes just have 1 meal per day - dinner. Jeremias reports this meal will be large due to being hungry. Choices include fast food and/or truck stops due to being on the road. Examples include [employer], [employer], [employer], Hot dogs from gas station.   When pt is at home, he reports he will usually have 2 meals per day- brunch and dinner. Jeremias reports that for brunch he will make a sandwich or have fast food. Jeremias reports that he is just used to fast food. For dinner, Jeremias reports that he will cook  food: white rice, beans, pork. Pt notes \"a lot of fried foods\".   Snacks include fruit, or a Dannon light and fit yogurt, or two hot dogs. Jeremias reports he has a fridge on his truck to keep foods/beverages.   Beverages: mostly water, 1 can/day of Pepsi Zero   Alcohol Intake: socially: pt estimates 2x/month, 4 bottles of beer per occasion.   Food Allergies? NKFAS   Food Intolerances? None   Food Dislikes? None   Do you eat when you are not hungry and/or when you feel full?  Do you eat when you are bored/stressed/emotional? Pt reports that sometimes he feels anxious and \"desperately needs sweets\" to help; mostly chocolate per pt.   Current Exercise/Exercise Hx: none currently. Pt notes he has shoulder and back pain. Exercise hx includes using his in-home treadmill and stationary " bike.   Hours of sleep per night: 5-6.   Work schedule: he is a . His shifts can last up to 12 hours per day and vary on/off. For example, he is currently home for the week (no driving) and then will be working for 3 weeks straight.   Who is in the household? Himself, his wife and their 4 children  Who does the cooking/grocery shopping? He does both when he is home.   What do you want to get out of surgery? Overall be a healthier individual and get to a healthy weight.   Motivation to change (1-10): 10     Estimated ability to achieve goals: good.     Today's Lesson: Review of Dr. Bustillos's lifelong rules, calorie counting, food label reading, promoting feelings of satiety, and energy balance.  Diet Goals: Practice the lifelong rules  Exercise Recommendations: Gradually work up to 150 minutes of moderate intensity exercise per week.  Behavior Changes: will be assessed every month  Behavior Goals:  1. Start to plan/prep meals for traveling and get into more of a meal regimen. We created a tentative meal schedule together with choices. I provided the handout below:   Meal times & food choices examples when at home (not working/traveling):   - Breakfast (@10): 2 eggs and turkey vanegas or turkey sausage. Or protein shakes. Or Elliot Julian breakfast bowls with turkey sausage. OR protein bar. Or a greek yogurt with hard boiled eggs or cottage cheese   - Lunch (@1-2pm): leftovers from dinner, fast food (mindful of choices and protein). Wrens with deli meat (ham, turkey, chicken), lettuce, tomato, onion. Frozen meal   - Dinner: baking or airfry protein option (ex. Chicken), rice OR beans, and non starchy vegetables   Meal times & food choices examples when we are traveling:  - Breakfast: greek yogurt, protein shake, or a protein bar, hard boiled eggs, cottage cheese   - Lunch: same option as above, or if eating out: choose a healthier option.   - Dinner: same options a above.     Plan: Will follow up next  month. Will continue to monitor pt's weight, dietary & behavior changes.        Verónica Maddox RD, LDN  Registered Dietitian, Licensed Dietitian Nutritionist

## 2024-02-24 ENCOUNTER — LAB (OUTPATIENT)
Dept: LAB | Facility: LAB | Age: 39
End: 2024-02-24
Payer: COMMERCIAL

## 2024-02-24 DIAGNOSIS — R73.9 HYPERGLYCEMIA: ICD-10-CM

## 2024-02-24 DIAGNOSIS — L50.8 CHRONIC URTICARIA: ICD-10-CM

## 2024-02-24 DIAGNOSIS — E61.0 COPPER DEFICIENCY: ICD-10-CM

## 2024-02-24 DIAGNOSIS — Z00.00 ROUTINE HEALTH MAINTENANCE: ICD-10-CM

## 2024-02-24 DIAGNOSIS — E55.9 VITAMIN D DEFICIENCY: ICD-10-CM

## 2024-02-24 DIAGNOSIS — K21.9 GERD WITHOUT ESOPHAGITIS: ICD-10-CM

## 2024-02-24 DIAGNOSIS — E51.9 THIAMINE DEFICIENCY: ICD-10-CM

## 2024-02-24 DIAGNOSIS — E53.8 B12 DEFICIENCY: ICD-10-CM

## 2024-02-24 DIAGNOSIS — E63.9 NUTRITIONAL DISORDER: ICD-10-CM

## 2024-02-24 DIAGNOSIS — E07.9 DISEASE OF THYROID GLAND: ICD-10-CM

## 2024-02-24 DIAGNOSIS — N52.9 ERECTILE DYSFUNCTION, UNSPECIFIED ERECTILE DYSFUNCTION TYPE: ICD-10-CM

## 2024-02-24 DIAGNOSIS — J45.20 MILD INTERMITTENT ASTHMA WITHOUT COMPLICATION (HHS-HCC): ICD-10-CM

## 2024-02-24 DIAGNOSIS — D68.9 COAGULATION DISORDER (MULTI): ICD-10-CM

## 2024-02-24 DIAGNOSIS — D50.8 IRON DEFICIENCY ANEMIA SECONDARY TO INADEQUATE DIETARY IRON INTAKE: ICD-10-CM

## 2024-02-24 DIAGNOSIS — E53.8 VITAMIN B12 DEFICIENCY: ICD-10-CM

## 2024-02-24 DIAGNOSIS — G47.33 OSA (OBSTRUCTIVE SLEEP APNEA): ICD-10-CM

## 2024-02-24 DIAGNOSIS — R68.82 DECREASED LIBIDO: ICD-10-CM

## 2024-02-24 DIAGNOSIS — Z71.3 ENCOUNTER FOR NUTRITION EVALUATION PRIOR TO BARIATRIC SURGERY: ICD-10-CM

## 2024-02-24 LAB
25(OH)D3 SERPL-MCNC: 12 NG/ML (ref 30–100)
ALBUMIN SERPL BCP-MCNC: 4.7 G/DL (ref 3.4–5)
ALP SERPL-CCNC: 77 U/L (ref 33–120)
ALT SERPL W P-5'-P-CCNC: 36 U/L (ref 10–52)
ANION GAP SERPL CALC-SCNC: 14 MMOL/L (ref 10–20)
APTT PPP: 32 SECONDS (ref 27–38)
AST SERPL W P-5'-P-CCNC: 23 U/L (ref 9–39)
BASOPHILS # BLD AUTO: 0.04 X10*3/UL (ref 0–0.1)
BASOPHILS NFR BLD AUTO: 0.7 %
BILIRUB SERPL-MCNC: 0.6 MG/DL (ref 0–1.2)
BUN SERPL-MCNC: 17 MG/DL (ref 6–23)
CALCIUM SERPL-MCNC: 9.7 MG/DL (ref 8.6–10.3)
CHLORIDE SERPL-SCNC: 104 MMOL/L (ref 98–107)
CHOLEST SERPL-MCNC: 193 MG/DL (ref 0–199)
CHOLESTEROL/HDL RATIO: 4.8
CO2 SERPL-SCNC: 28 MMOL/L (ref 21–32)
CREAT SERPL-MCNC: 1.18 MG/DL (ref 0.5–1.3)
EGFRCR SERPLBLD CKD-EPI 2021: 81 ML/MIN/1.73M*2
EOSINOPHIL # BLD AUTO: 0.14 X10*3/UL (ref 0–0.7)
EOSINOPHIL NFR BLD AUTO: 2.5 %
ERYTHROCYTE [DISTWIDTH] IN BLOOD BY AUTOMATED COUNT: 13 % (ref 11.5–14.5)
EST. AVERAGE GLUCOSE BLD GHB EST-MCNC: 105 MG/DL
FERRITIN SERPL-MCNC: 168 NG/ML (ref 20–300)
FOLATE SERPL-MCNC: 13.9 NG/ML
GLUCOSE SERPL-MCNC: 102 MG/DL (ref 74–99)
HBA1C MFR BLD: 5.3 %
HCT VFR BLD AUTO: 48.9 % (ref 41–52)
HDLC SERPL-MCNC: 40.5 MG/DL
HGB BLD-MCNC: 16.7 G/DL (ref 13.5–17.5)
IMM GRANULOCYTES # BLD AUTO: 0.06 X10*3/UL (ref 0–0.7)
IMM GRANULOCYTES NFR BLD AUTO: 1.1 % (ref 0–0.9)
INR PPP: 1 (ref 0.9–1.1)
IRON SATN MFR SERPL: 26 % (ref 25–45)
IRON SERPL-MCNC: 93 UG/DL (ref 35–150)
LDLC SERPL CALC-MCNC: 126 MG/DL
LYMPHOCYTES # BLD AUTO: 1.76 X10*3/UL (ref 1.2–4.8)
LYMPHOCYTES NFR BLD AUTO: 31.2 %
MCH RBC QN AUTO: 30.9 PG (ref 26–34)
MCHC RBC AUTO-ENTMCNC: 34.2 G/DL (ref 32–36)
MCV RBC AUTO: 90 FL (ref 80–100)
MONOCYTES # BLD AUTO: 0.6 X10*3/UL (ref 0.1–1)
MONOCYTES NFR BLD AUTO: 10.6 %
NEUTROPHILS # BLD AUTO: 3.05 X10*3/UL (ref 1.2–7.7)
NEUTROPHILS NFR BLD AUTO: 53.9 %
NON HDL CHOLESTEROL: 153 MG/DL (ref 0–149)
NRBC BLD-RTO: 0 /100 WBCS (ref 0–0)
PLATELET # BLD AUTO: 174 X10*3/UL (ref 150–450)
POTASSIUM SERPL-SCNC: 4.7 MMOL/L (ref 3.5–5.3)
PROT SERPL-MCNC: 7.1 G/DL (ref 6.4–8.2)
PROTHROMBIN TIME: 10.9 SECONDS (ref 9.8–12.8)
PTH-INTACT SERPL-MCNC: 70.1 PG/ML (ref 18.5–88)
RBC # BLD AUTO: 5.41 X10*6/UL (ref 4.5–5.9)
SODIUM SERPL-SCNC: 141 MMOL/L (ref 136–145)
TIBC SERPL-MCNC: 360 UG/DL (ref 240–445)
TRIGL SERPL-MCNC: 135 MG/DL (ref 0–149)
TSH SERPL-ACNC: 2.18 MIU/L (ref 0.44–3.98)
UIBC SERPL-MCNC: 267 UG/DL (ref 110–370)
VIT B12 SERPL-MCNC: 386 PG/ML (ref 211–911)
VLDL: 27 MG/DL (ref 0–40)
WBC # BLD AUTO: 5.7 X10*3/UL (ref 4.4–11.3)

## 2024-02-24 PROCEDURE — 83540 ASSAY OF IRON: CPT

## 2024-02-24 PROCEDURE — 85730 THROMBOPLASTIN TIME PARTIAL: CPT

## 2024-02-24 PROCEDURE — 84425 ASSAY OF VITAMIN B-1: CPT

## 2024-02-24 PROCEDURE — 80061 LIPID PANEL: CPT

## 2024-02-24 PROCEDURE — 84443 ASSAY THYROID STIM HORMONE: CPT

## 2024-02-24 PROCEDURE — 87449 NOS EACH ORGANISM AG IA: CPT

## 2024-02-24 PROCEDURE — 84630 ASSAY OF ZINC: CPT

## 2024-02-24 PROCEDURE — 84446 ASSAY OF VITAMIN E: CPT

## 2024-02-24 PROCEDURE — 83970 ASSAY OF PARATHORMONE: CPT

## 2024-02-24 PROCEDURE — 82728 ASSAY OF FERRITIN: CPT

## 2024-02-24 PROCEDURE — 83036 HEMOGLOBIN GLYCOSYLATED A1C: CPT

## 2024-02-24 PROCEDURE — 80053 COMPREHEN METABOLIC PANEL: CPT

## 2024-02-24 PROCEDURE — 82607 VITAMIN B-12: CPT

## 2024-02-24 PROCEDURE — 36415 COLL VENOUS BLD VENIPUNCTURE: CPT

## 2024-02-24 PROCEDURE — 82306 VITAMIN D 25 HYDROXY: CPT

## 2024-02-24 PROCEDURE — 84590 ASSAY OF VITAMIN A: CPT

## 2024-02-24 PROCEDURE — 84402 ASSAY OF FREE TESTOSTERONE: CPT

## 2024-02-24 PROCEDURE — 85025 COMPLETE CBC W/AUTO DIFF WBC: CPT

## 2024-02-24 PROCEDURE — 82525 ASSAY OF COPPER: CPT

## 2024-02-24 PROCEDURE — 85610 PROTHROMBIN TIME: CPT

## 2024-02-24 PROCEDURE — 82746 ASSAY OF FOLIC ACID SERUM: CPT

## 2024-02-24 PROCEDURE — 83550 IRON BINDING TEST: CPT

## 2024-02-25 RX ORDER — CHOLECALCIFEROL (VITAMIN D3) 1250 MCG
50000 TABLET ORAL
Qty: 12 TABLET | Refills: 3 | Status: SHIPPED | OUTPATIENT
Start: 2024-02-25 | End: 2025-02-24

## 2024-02-27 LAB
COPPER SERPL-MCNC: 115.8 UG/DL (ref 70–140)
H PYLORI AG STL QL IA: NEGATIVE
VIT B1 PYROPHOSHATE BLD-SCNC: 99 NMOL/L (ref 70–180)
ZINC SERPL-MCNC: 107.4 UG/DL (ref 60–120)

## 2024-02-28 LAB
A-TOCOPHEROL VIT E SERPL-MCNC: 10.6 MG/L (ref 5.5–18)
ANNOTATION COMMENT IMP: NORMAL
BETA+GAMMA TOCOPHEROL SERPL-MCNC: 1.7 MG/L (ref 0–6)
RETINYL PALMITATE SERPL-MCNC: 0.02 MG/L (ref 0–0.1)
VIT A SERPL-MCNC: 0.6 MG/L (ref 0.3–1.2)

## 2024-03-03 LAB
TESTOSTERONE FREE (CHAN): 61.6 PG/ML (ref 35–155)
TESTOSTERONE,TOTAL,LC-MS/MS: 358 NG/DL (ref 250–1100)

## 2024-03-05 ENCOUNTER — OFFICE VISIT (OUTPATIENT)
Dept: SLEEP MEDICINE | Facility: CLINIC | Age: 39
End: 2024-03-05
Payer: COMMERCIAL

## 2024-03-05 VITALS
HEART RATE: 94 BPM | WEIGHT: 261 LBS | HEIGHT: 69 IN | DIASTOLIC BLOOD PRESSURE: 70 MMHG | SYSTOLIC BLOOD PRESSURE: 102 MMHG | BODY MASS INDEX: 38.66 KG/M2 | OXYGEN SATURATION: 96 %

## 2024-03-05 DIAGNOSIS — R09.81 NASAL CONGESTION: ICD-10-CM

## 2024-03-05 DIAGNOSIS — G47.19 EXCESSIVE DAYTIME SLEEPINESS: ICD-10-CM

## 2024-03-05 DIAGNOSIS — G47.33 OSA (OBSTRUCTIVE SLEEP APNEA): Primary | ICD-10-CM

## 2024-03-05 DIAGNOSIS — J30.89 NON-SEASONAL ALLERGIC RHINITIS, UNSPECIFIED TRIGGER: ICD-10-CM

## 2024-03-05 PROCEDURE — 4004F PT TOBACCO SCREEN RCVD TLK: CPT | Performed by: PHYSICIAN ASSISTANT

## 2024-03-05 PROCEDURE — 99214 OFFICE O/P EST MOD 30 MIN: CPT | Performed by: PHYSICIAN ASSISTANT

## 2024-03-05 PROCEDURE — 3008F BODY MASS INDEX DOCD: CPT | Performed by: PHYSICIAN ASSISTANT

## 2024-03-05 ASSESSMENT — SLEEP AND FATIGUE QUESTIONNAIRES
HOW LIKELY ARE YOU TO NOD OFF OR FALL ASLEEP WHILE SITTING AND READING: HIGH CHANCE OF DOZING
HOW LIKELY ARE YOU TO NOD OFF OR FALL ASLEEP IN A CAR, WHILE STOPPED FOR A FEW MINUTES IN TRAFFIC: WOULD NEVER DOZE
HOW LIKELY ARE YOU TO NOD OFF OR FALL ASLEEP WHILE SITTING QUIETLY AFTER LUNCH WITHOUT ALCOHOL: WOULD NEVER DOZE
HOW LIKELY ARE YOU TO NOD OFF OR FALL ASLEEP WHILE LYING DOWN TO REST IN THE AFTERNOON WHEN CIRCUMSTANCES PERMIT: HIGH CHANCE OF DOZING
SITING INACTIVE IN A PUBLIC PLACE LIKE A CLASS ROOM OR A MOVIE THEATER: HIGH CHANCE OF DOZING
HOW LIKELY ARE YOU TO NOD OFF OR FALL ASLEEP WHILE WATCHING TV: HIGH CHANCE OF DOZING
ESS-CHAD TOTAL SCORE: 15
HOW LIKELY ARE YOU TO NOD OFF OR FALL ASLEEP WHILE SITTING AND TALKING TO SOMEONE: WOULD NEVER DOZE
HOW LIKELY ARE YOU TO NOD OFF OR FALL ASLEEP WHEN YOU ARE A PASSENGER IN A CAR FOR AN HOUR WITHOUT A BREAK: HIGH CHANCE OF DOZING

## 2024-03-05 ASSESSMENT — PAIN SCALES - GENERAL: PAINLEVEL: 0-NO PAIN

## 2024-03-05 NOTE — PATIENT INSTRUCTIONS
I changed the pressure range of your CPAP and turned the humidity up.    Lets see if the new nasal mask is more comfortable and helps reduce leak    Eduardo Flores PA-C    IMPORTANT PHONE NUMBERS     [id number] ([id number])  [employer] ([employer]): [phone]  For clinical questions and refilling prescriptions: [phone]  Maida Rodriguez (For Kael/Sandra): P: [phone]

## 2024-03-05 NOTE — PROGRESS NOTES
"[facility]  Followup Visit Note    HISTORY OF PRESENT ILLNESS   Jeremias Weaver is a 38 y.o. male who presents to a [facility] for followup.       Current History    On today's visit, the patient reports he is struggling to use CPAP d/t dry mouth. He thinks the pressure feels better when higher.    When he wakes up with very dry mouth he gasps and struggles to breath.    He is a  and has been falling asleep at the wheel.    He has started the process of bariatric surgery for weight loss.    He has also been taking benadryl to help fall asleep.    Sleep Scales:  ESS: 15     REVIEW OF SYSTEMS    All other systems negative      PHYSICAL EXAM     VITAL SIGNS: /70   Pulse 94   Ht 1.753 m (5' 9\")   Wt 118 kg (261 lb)   SpO2 96%   BMI 38.54 kg/m²      CURRENT WEIGHT: [unfilled]  BMI: [unfilled]  PREVIOUS WEIGHTS:  Wt Readings from Last 3 Encounters:   03/05/24 118 kg (261 lb)   02/22/24 119 kg (262 lb)   11/30/23 116 kg (256 lb)       Constitutional: Alert and oriented, cooperative, no obvious distress   HEENT: Non icteric or anemic, EOM WNL bilaterally   Neck: Supple, no JVD, no goiter, no adenopathy, no rigidity  Extremities: No clubbing, no LL edema   Neuromuscular: Cranial nerves grossly intact, no focal deficits     RESULTS/DATA     Iron (ug/dL)   Date Value   02/24/2024 93     % Saturation (%)   Date Value   02/24/2024 26     TIBC (ug/dL)   Date Value   02/24/2024 360     Ferritin (ng/mL)   Date Value   02/24/2024 168         ASSESSMENT/PLAN     Mr. Weaver is a 38 y.o. male and returns in followup for the following issues:    OBSTRUCTIVE SLEEP APNEA / SLEEPINESS  -Provided nasal mask to try, humidity increased, changed to auto pressure (6-16)  -Troubleshooting session with machine/mask in clinic today  -Supplies ordered  -Longer term weight loss may eliminate SILVER and associated symptoms    BMI>35  -Body mass index is 38.54 kg/m².  " today  -With sufficient weight loss may no longer require treatment for SILVER  -Pursuing gastric bypass    NASAL CONGESTION  -Already has referral with ENT    Follow up 1 month

## 2024-03-08 NOTE — PROGRESS NOTES
Medical Weight Loss Appointment (MWL 2)    Current Weight: 252 lb / This reflects a 10 lb wt loss this past month.   Current BMI: 37.21    Current Diet: not following lifelong rules at this time.   Adherence: fair to poor due to sleep per pt.   Daily Intake: mostly 1 meal per day.   Breakfast- mostly skipped, OR scrambled eggs and turkey vanegas  Lunch- mostly skipped, OR airfried fish sticks, OR pizza rolls   Dinner (@5pm)- a pasta dish, OR baked chicken with side of white rice and beans, OR pork chops   Jeremias reports he ate out 1x/week this past month.   Snacks: none.   Beverages: water, juice, diet pop  Exercise: none   Behavior/Diet Changes: Jeremias reports very low hunger and meal intake due to his sleep. Jeremias reports that due to his sleep apnea or just overall not feeling tired he will go to bed at 3-4 am and then wake up at noon. Jeremias reports he also has a condition called urticaria (itchy hives) which plays a roll in his lack of sleep. Jeremias reports he takes benadryl frequently to help with the symptoms. Jeremias reports that he plans to start steroid injections next month.     Today's Lesson: Reviewed patient's dietary changes and food recall. Reviewed a meal structure and importance of protein.   Diet Goals: Practice the lifelong rules  Exercise Recommendations: Gradually work up to 150 minutes of moderate intensity exercise per week.  Behavior Goals:   1. Incorporate a meal between around noon and 2 pm. Preferable high in protein.  2. Focus on protein at dinner meal. Eat protein source first.   3. Switch to zero sugar juice option.     Plan: Will follow up next month. Will continue to monitor pt's weight, dietary & behavior changes.       Verónica Maddox RD, LDN  Registered Dietitian, Licensed Dietitian Nutritionist

## 2024-03-12 ENCOUNTER — NUTRITION (OUTPATIENT)
Dept: SURGERY | Facility: CLINIC | Age: 39
End: 2024-03-12
Payer: COMMERCIAL

## 2024-03-12 ENCOUNTER — OFFICE VISIT (OUTPATIENT)
Dept: SURGERY | Facility: CLINIC | Age: 39
End: 2024-03-12
Payer: COMMERCIAL

## 2024-03-12 VITALS
HEIGHT: 69 IN | RESPIRATION RATE: 16 BRPM | BODY MASS INDEX: 37.33 KG/M2 | WEIGHT: 252 LBS | DIASTOLIC BLOOD PRESSURE: 80 MMHG | SYSTOLIC BLOOD PRESSURE: 122 MMHG | HEART RATE: 80 BPM

## 2024-03-12 VITALS — BODY MASS INDEX: 37.21 KG/M2 | WEIGHT: 252 LBS

## 2024-03-12 DIAGNOSIS — K21.9 GERD WITHOUT ESOPHAGITIS: ICD-10-CM

## 2024-03-12 DIAGNOSIS — L50.0 ALLERGIC URTICARIA: ICD-10-CM

## 2024-03-12 DIAGNOSIS — Z72.0 TOBACCO USE: ICD-10-CM

## 2024-03-12 DIAGNOSIS — J45.20 MILD INTERMITTENT ASTHMA WITHOUT COMPLICATION (HHS-HCC): ICD-10-CM

## 2024-03-12 DIAGNOSIS — G47.33 OSA (OBSTRUCTIVE SLEEP APNEA): Primary | ICD-10-CM

## 2024-03-12 PROCEDURE — 3008F BODY MASS INDEX DOCD: CPT | Performed by: INTERNAL MEDICINE

## 2024-03-12 PROCEDURE — 99214 OFFICE O/P EST MOD 30 MIN: CPT | Performed by: INTERNAL MEDICINE

## 2024-03-12 PROCEDURE — 4004F PT TOBACCO SCREEN RCVD TLK: CPT | Performed by: INTERNAL MEDICINE

## 2024-03-12 RX ORDER — BUPROPION HYDROCHLORIDE 150 MG/1
150 TABLET ORAL EVERY MORNING
Qty: 90 TABLET | Refills: 1 | Status: SHIPPED | OUTPATIENT
Start: 2024-03-12 | End: 2025-03-12

## 2024-03-12 RX ORDER — CETIRIZINE HYDROCHLORIDE 10 MG/1
10 TABLET ORAL DAILY
Qty: 30 TABLET | Refills: 5 | Status: SHIPPED | OUTPATIENT
Start: 2024-03-12 | End: 2024-09-08

## 2024-03-12 RX ORDER — MONTELUKAST SODIUM 10 MG/1
10 TABLET ORAL NIGHTLY
Qty: 30 TABLET | Refills: 5 | Status: SHIPPED | OUTPATIENT
Start: 2024-03-12 | End: 2024-09-08

## 2024-03-12 ASSESSMENT — ENCOUNTER SYMPTOMS
ROS GI COMMENTS: HEARTBURN
OCCASIONAL FEELINGS OF UNSTEADINESS: 0
BACK PAIN: 1
ABDOMINAL PAIN: 0
NAUSEA: 0
PALPITATIONS: 0
SLEEP DISTURBANCE: 1
CONSTIPATION: 0
DEPRESSION: 0
LOSS OF SENSATION IN FEET: 0
DIZZINESS: 0
DIARRHEA: 0
ALLERGIC/IMMUNOLOGIC COMMENTS: ALLERGIES
ARTHRALGIAS: 1
COUGH: 0
SHORTNESS OF BREATH: 0

## 2024-03-12 ASSESSMENT — PATIENT HEALTH QUESTIONNAIRE - PHQ9
2. FEELING DOWN, DEPRESSED OR HOPELESS: NOT AT ALL
1. LITTLE INTEREST OR PLEASURE IN DOING THINGS: NOT AT ALL
SUM OF ALL RESPONSES TO PHQ9 QUESTIONS 1 AND 2: 0

## 2024-03-12 ASSESSMENT — PAIN SCALES - GENERAL: PAINLEVEL: 0-NO PAIN

## 2024-03-12 NOTE — PROGRESS NOTES
"Subjective   Patient ID: Jeremias Weaver is a 38 y.o. male who presents for [facility] visit 2. He currently has 1 meal a day. He is not hungry when he wakes up and only eats dinner. Does not snack. Drinks mostly water, sometimes juice. He is interested in medication to curb his cravings. Regarding exercise, he is limited due to shoulder and back pain and does not have a current regimen. Uses CPAP (~1 year old) and is having difficulty tolerating it. He has allergies that cause sleep disturbance and takes Benadryl. Tried Allegra. Endorses heartburn that is relieved with Pepcid. Takes vit D once a week. Currently smokes vape, trying to cut down.    Diagnostics Reviewed: 11/2023 EKG NSR  Labs Reviewed: 2/2024 labs WNL except vit D. Hyplori neg.         Review of Systems   Respiratory:  Negative for cough and shortness of breath.    Cardiovascular:  Negative for chest pain and palpitations.   Gastrointestinal:  Negative for abdominal pain, constipation, diarrhea and nausea.        Heartburn   Musculoskeletal:  Positive for arthralgias (shoulder) and back pain.   Allergic/Immunologic:        Allergies   Neurological:  Negative for dizziness.   Psychiatric/Behavioral:  Positive for sleep disturbance.        Objective   /80   Pulse 80   Resp 16   Ht 1.753 m (5' 9\")   Wt 114 kg (252 lb)   BMI 37.21 kg/m²     Physical Exam  Constitutional:       Appearance: He is obese.   HENT:      Mouth/Throat:      Comments: Dentition ok  Cardiovascular:      Rate and Rhythm: Normal rate and regular rhythm.   Pulmonary:      Breath sounds: Normal breath sounds.   Abdominal:      General: Bowel sounds are normal.      Palpations: Abdomen is soft.   Musculoskeletal:         General: No swelling.   Neurological:      Mental Status: He is alert.         Assessment/Plan   Diagnoses and all orders for this visit:  SILVER (obstructive sleep apnea)  Mild intermittent asthma without complication  GERD without esophagitis  Allergic urticaria  -     " montelukast (Singulair) 10 mg tablet; Take 1 tablet (10 mg) by mouth once daily at bedtime.  -     cetirizine (ZyrTEC) 10 mg tablet; Take 1 tablet (10 mg) by mouth once daily.  Tobacco use  -     buPROPion XL (Wellbutrin XL) 150 mg 24 hr tablet; Take 1 tablet (150 mg) by mouth once daily in the morning. Do not crush, chew, or split.        Scribe Attestation  By signing my name below, I, Darby uCrrie, Scribe   attest that this documentation has been prepared under the direction and in the presence of Rhonda Dubois MD.

## 2024-04-09 ENCOUNTER — OFFICE VISIT (OUTPATIENT)
Dept: SLEEP MEDICINE | Facility: CLINIC | Age: 39
End: 2024-04-09
Payer: COMMERCIAL

## 2024-04-09 VITALS
BODY MASS INDEX: 37.77 KG/M2 | HEIGHT: 69 IN | WEIGHT: 255 LBS | OXYGEN SATURATION: 99 % | DIASTOLIC BLOOD PRESSURE: 82 MMHG | SYSTOLIC BLOOD PRESSURE: 112 MMHG | HEART RATE: 95 BPM

## 2024-04-09 DIAGNOSIS — F51.04 CHRONIC INSOMNIA: ICD-10-CM

## 2024-04-09 DIAGNOSIS — G47.19 EXCESSIVE DAYTIME SLEEPINESS: Primary | ICD-10-CM

## 2024-04-09 DIAGNOSIS — G47.33 OSA (OBSTRUCTIVE SLEEP APNEA): ICD-10-CM

## 2024-04-09 PROCEDURE — 4004F PT TOBACCO SCREEN RCVD TLK: CPT | Performed by: PHYSICIAN ASSISTANT

## 2024-04-09 PROCEDURE — 3008F BODY MASS INDEX DOCD: CPT | Performed by: PHYSICIAN ASSISTANT

## 2024-04-09 PROCEDURE — 99214 OFFICE O/P EST MOD 30 MIN: CPT | Performed by: PHYSICIAN ASSISTANT

## 2024-04-09 RX ORDER — TRAZODONE HYDROCHLORIDE 50 MG/1
50 TABLET ORAL NIGHTLY
Qty: 30 TABLET | Refills: 1 | Status: SHIPPED | OUTPATIENT
Start: 2024-04-09 | End: 2024-06-08

## 2024-04-09 ASSESSMENT — SLEEP AND FATIGUE QUESTIONNAIRES
HOW LIKELY ARE YOU TO NOD OFF OR FALL ASLEEP WHILE SITTING AND TALKING TO SOMEONE: SLIGHT CHANCE OF DOZING
HOW LIKELY ARE YOU TO NOD OFF OR FALL ASLEEP WHILE LYING DOWN TO REST IN THE AFTERNOON WHEN CIRCUMSTANCES PERMIT: HIGH CHANCE OF DOZING
ESS-CHAD TOTAL SCORE: 17
HOW LIKELY ARE YOU TO NOD OFF OR FALL ASLEEP WHILE SITTING QUIETLY AFTER LUNCH WITHOUT ALCOHOL: MODERATE CHANCE OF DOZING
SITING INACTIVE IN A PUBLIC PLACE LIKE A CLASS ROOM OR A MOVIE THEATER: SLIGHT CHANCE OF DOZING
HOW LIKELY ARE YOU TO NOD OFF OR FALL ASLEEP WHILE SITTING AND READING: HIGH CHANCE OF DOZING
HOW LIKELY ARE YOU TO NOD OFF OR FALL ASLEEP WHILE WATCHING TV: HIGH CHANCE OF DOZING
HOW LIKELY ARE YOU TO NOD OFF OR FALL ASLEEP WHEN YOU ARE A PASSENGER IN A CAR FOR AN HOUR WITHOUT A BREAK: MODERATE CHANCE OF DOZING
HOW LIKELY ARE YOU TO NOD OFF OR FALL ASLEEP IN A CAR, WHILE STOPPED FOR A FEW MINUTES IN TRAFFIC: MODERATE CHANCE OF DOZING

## 2024-04-09 ASSESSMENT — PAIN SCALES - GENERAL: PAINLEVEL: 0-NO PAIN

## 2024-04-09 NOTE — PROGRESS NOTES
"[facility]  Followup Visit Note    HISTORY OF PRESENT ILLNESS   Jeremias Weaver is a 38 y.o. male who presents to a [facility] for followup.       Current History    On today's visit, the patient reports he has been tolerating CPAP moderately well. Doesn't feel any different when he uses CPAP. He is a truckdriver. He finds the mask seems to leak although his machine does not report much leak. Does like current mask more.    Having difficulty falling asleep. Was using benadryl before. Finds it difficult to wake up as well as go to sleep.    He is pursuing bariatric surgery.    PAP Adherence:  Durable Medical Equipment Company: [employer]   Pressure Range: 6-16 cm H2O Mask: n30    A PAP adherence download was obtained and data was reviewed personally today in clinic. (see scanned document in EPIC)  % days >4 hours use: 77  Average use : 4 hr 38 min  95% pressure 8.7 cm H2O  95% leak : 12.3 L/min  AHI: 1.3    Sleep Scales:  ESS: 17     REVIEW OF SYSTEMS    All other systems negative      PHYSICAL EXAM     VITAL SIGNS: /82   Pulse 95   Ht 1.753 m (5' 9\")   Wt 116 kg (255 lb)   SpO2 99%   BMI 37.66 kg/m²      CURRENT WEIGHT: [unfilled]  BMI: [unfilled]  PREVIOUS WEIGHTS:  Wt Readings from Last 3 Encounters:   04/09/24 116 kg (255 lb)   03/12/24 114 kg (252 lb)   03/12/24 114 kg (252 lb)       Constitutional: Alert and oriented, cooperative, no obvious distress   HEENT: Non icteric or anemic, EOM WNL bilaterally   Neck: Supple, no JVD, no goiter, no adenopathy, no rigidity  Extremities: No clubbing, no LL edema   Neuromuscular: Cranial nerves grossly intact, no focal deficits     RESULTS/DATA     Iron (ug/dL)   Date Value   02/24/2024 93     % Saturation (%)   Date Value   02/24/2024 26     TIBC (ug/dL)   Date Value   02/24/2024 360     Ferritin (ng/mL)   Date Value   02/24/2024 168         ASSESSMENT/PLAN     Mr. Weaver is a 38 y.o. male and returns in followup " for the following issues:    OBSTRUCTIVE SLEEP APNEA, MODERATE  -Compliant with CPAP  -Continue to use CPAP nightly  -Bring CPAP to bariatric surgery and use during recovery  -Tolerating mask, pressure, humidity well    BMI>37  -Body mass index is 37.66 kg/m².  today  -With sufficient weight loss may no longer require treatment for SILVER  -Pursuing bariatric surgery    CHRONIC INSOMNIA / ?DELAYED SLEEP PHASE  -Recommend melatonin 1 mg 4-5 hours prior to intended bedtime  -Try increasing light exposure in the morning and decreasing light exposure in the afternoon   -Start trazodone 50 mg PRN at night    Follow up after bariatric surgery to re-evaluate SILVER

## 2024-04-09 NOTE — LETTER
April 9, 2024     Jeremias Owen  [address] [address]    Patient: Jeremias Weaver   YOB: 1985   Date of Visit: 4/9/2024       Dear Dr. Jeremias Weaver:    Jeremias Weaver is compliant with CPAP therapy.    If you have questions, please do not hesitate to call me.       Sincerely,     Eduardo Flores PA-C

## 2024-04-09 NOTE — PATIENT INSTRUCTIONS
DELAYED SLEEP PHASE  -Recommend melatonin 1 mg 4-5 hours prior to intended bedtime  -Try increasing light exposure in the morning and decreasing light exposure in the afternoon    Good luck with surgery. Continue to use CPAP nightly. We will likely re-evaluate SILVER after surgery.    Try trazodone for help falling asleep.    Eduardo Flores PA-C    IMPORTANT PHONE NUMBERS     [id number] ([id number])  [employer] ([employer]): [phone]  For clinical questions and refilling prescriptions: [phone]  Maida Rodriguez (For Kael/Sandra): P: [phone]

## 2024-04-11 ENCOUNTER — TELEPHONE (OUTPATIENT)
Dept: SURGERY | Facility: CLINIC | Age: 39
End: 2024-04-11

## 2024-04-11 NOTE — TELEPHONE ENCOUNTER
CLM re missed consult and dietitian and internist appts this morning.  Plse call and provided contact info.

## 2024-07-28 ENCOUNTER — HOSPITAL ENCOUNTER (EMERGENCY)
Facility: HOSPITAL | Age: 39
Discharge: HOME | End: 2024-07-28
Attending: EMERGENCY MEDICINE
Payer: COMMERCIAL

## 2024-07-28 VITALS
RESPIRATION RATE: 18 BRPM | TEMPERATURE: 98.1 F | HEIGHT: 69 IN | OXYGEN SATURATION: 97 % | SYSTOLIC BLOOD PRESSURE: 130 MMHG | BODY MASS INDEX: 37.03 KG/M2 | HEART RATE: 74 BPM | DIASTOLIC BLOOD PRESSURE: 92 MMHG | WEIGHT: 250 LBS

## 2024-07-28 DIAGNOSIS — L25.9 CONTACT DERMATITIS, UNSPECIFIED CONTACT DERMATITIS TYPE, UNSPECIFIED TRIGGER: Primary | ICD-10-CM

## 2024-07-28 PROCEDURE — 99282 EMERGENCY DEPT VISIT SF MDM: CPT

## 2024-07-28 PROCEDURE — 99284 EMERGENCY DEPT VISIT MOD MDM: CPT | Performed by: EMERGENCY MEDICINE

## 2024-07-28 RX ORDER — PREDNISONE 50 MG/1
50 TABLET ORAL DAILY
Qty: 5 TABLET | Refills: 0 | Status: SHIPPED | OUTPATIENT
Start: 2024-07-28 | End: 2024-08-02

## 2024-07-28 RX ORDER — DIPHENHYDRAMINE HCL 25 MG
25 TABLET ORAL EVERY 6 HOURS
Qty: 20 TABLET | Refills: 0 | Status: SHIPPED | OUTPATIENT
Start: 2024-07-28 | End: 2024-08-02

## 2024-07-28 ASSESSMENT — COLUMBIA-SUICIDE SEVERITY RATING SCALE - C-SSRS
2. HAVE YOU ACTUALLY HAD ANY THOUGHTS OF KILLING YOURSELF?: NO
1. IN THE PAST MONTH, HAVE YOU WISHED YOU WERE DEAD OR WISHED YOU COULD GO TO SLEEP AND NOT WAKE UP?: NO
6. HAVE YOU EVER DONE ANYTHING, STARTED TO DO ANYTHING, OR PREPARED TO DO ANYTHING TO END YOUR LIFE?: NO

## 2024-07-28 ASSESSMENT — LIFESTYLE VARIABLES
HAVE PEOPLE ANNOYED YOU BY CRITICIZING YOUR DRINKING: NO
HAVE YOU EVER FELT YOU SHOULD CUT DOWN ON YOUR DRINKING: NO
TOTAL SCORE: 0
EVER HAD A DRINK FIRST THING IN THE MORNING TO STEADY YOUR NERVES TO GET RID OF A HANGOVER: NO
EVER FELT BAD OR GUILTY ABOUT YOUR DRINKING: NO

## 2024-07-28 ASSESSMENT — PAIN SCALES - GENERAL
PAINLEVEL_OUTOF10: 0 - NO PAIN
PAINLEVEL_OUTOF10: 0 - NO PAIN

## 2024-07-28 ASSESSMENT — PAIN - FUNCTIONAL ASSESSMENT
PAIN_FUNCTIONAL_ASSESSMENT: 0-10
PAIN_FUNCTIONAL_ASSESSMENT: 0-10

## 2024-07-28 NOTE — ED PROVIDER NOTES
39-year-old male patient arriving with HPI   Chief Complaint   Patient presents with    Rash     Started left wrist. Reports spreading to whole body.   Pt reports took allergy medication with no relief.          HPI    39-year-old male patient arriving with a rash since Friday on his left arm and left chest as well as left shin.  He states that it began on his left wrist.  It is diffuse and vesicular.  He states that it is itchy.  He has used Zyrtec and hydrocortisone.  He denies any history of psoriasis or eczema.  He has a history of arthritis, asthma, reflux and surgical history of appendectomy and family history of COPD and hypertension and heart disease and states that he uses tobacco.  He also uses alcohol    Patient History   Past Medical History:   Diagnosis Date    Allergic     Arthritis     Asthma ([facility])     GERD (gastroesophageal reflux disease)     Visual impairment      Past Surgical History:   Procedure Laterality Date    APPENDECTOMY       Family History   Problem Relation Name Age of Onset    Arthritis Mother Mother     Asthma Mother Mother     COPD Mother Mother     Stroke Father Father     Diabetes Paternal Grandmother UNK     Hypertension Paternal Grandmother UNK     Heart disease Paternal Grandmother UNK      Social History     Tobacco Use    Smoking status: Former     Current packs/day: 0.00     Average packs/day: 1 pack/day for 20.0 years (20.0 ttl pk-yrs)     Types: Cigarettes     Start date: 2001     Quit date: 2021     Years since quitting: 3.5     Passive exposure: Never    Smokeless tobacco: Current    Tobacco comments:     Vapes daily   Vaping Use    Vaping status: Every Day    Substances: Nicotine   Substance Use Topics    Alcohol use: Not Currently     Alcohol/week: 6.0 standard drinks of alcohol     Types: 6 Cans of beer per week     Comment: Social    Drug use: Never       Physical Exam   ED Triage Vitals [07/28/24 1141]   Temperature Heart Rate Respirations BP   36.7 °C (98.1 °F)  66 20 140/90      Pulse Ox Temp Source Heart Rate Source Patient Position   97 % Temporal Monitor Sitting      BP Location FiO2 (%)     Right arm --       Physical Exam  General: Alert, no acute distress, well developed, Well hydrated  Head: NCAT  Eyes: Clear conjunctiva, EOMI  ENT: Moist mucosa, no lymphadenopathy  Respiratory: Normal respiratory effort. CTAB. Symmetric aeration. No wheezes, rales, or Rhonchi.  CV: Normal rhythm, Normal Rate, pulses present bilaterally  GI: Soft, NT, no RBT, no guarding, No distention  : no flank tenderness, no cva tenderness  MSK: Atraumatic. Full ROM in extremities. Bilateral symmetric intact strength. No pedal edema.  Skin: Erythematous, edematous, vesicular diffuse rash upon his left arm, left chest, fainter upon his right chest, also apparent on his left wrist and left shin  Neuro: AOx3, facial symmetry,   sensorimotor intact in extremities,   CN intact, Nonfocal neurological exam      ED Course & MDM   Diagnoses as of 07/28/24 1502   Contact dermatitis, unspecified contact dermatitis type, unspecified trigger                       Louie Coma Scale Score: 15                        Medical Decision Making        39-year-old male patient arriving with a rash since Friday on his left arm and left chest as well as left shin.  He states that it began on his left wrist.  It is diffuse and vesicular.  He states that it is itchy.  He has used Zyrtec and hydrocortisone.  Exam is significant for Erythematous, edematous, vesicular diffuse rash upon his left arm, left chest, fainter upon his right chest, also apparent on his left wrist and left shin.  No clinical concern for Matson-Cb syndrome or other desquamating condition at this time.  Vital signs reassuring.  Patient seen and examined at bedside.  Chickenpox was considered.  Clinically, he likely has poison ivy or other contact dermatitis type IV hypersensitivity for which she was treated with IV Benadryl here and also  discharged in stable condition with return precautions with prescriptions for Benadryl, prednisone and was given return precautions of any fever, worsening rash, difficulty breathing.  He was in agreement with the plan.    External Records Reviewed: I reviewed recent and relevant outside records including: Prior  Independent Interpretation of Studies: I independently interpreted: As above  Social Determinants Affecting Care: Diagnostic testing considered: As above    I reviewed the case with the attending ER physician. Patient and/or patient´s representative was counseled regarding labs, imaging, likely diagnosis, and plan.     Vaughn Brown MD MS  Emergency Medicine    The above documentation was completed with the use of speech recognition software. It may contain dictation errors secondary to limitations of the software.        Vaughn Brown MD  Resident  07/28/24 [id number]

## 2024-07-28 NOTE — DISCHARGE INSTRUCTIONS
Follow-up with your primary care provider and take your Medications Prednisone and Benadryl As Directed.  Return to the ER for Any Fevers, Worsening Rash, Difficulty Breathing or Other concerning Symptoms.

## 2024-11-14 NOTE — TELEPHONE ENCOUNTER
Patient temporarily totally disabled 4- until approximately 7-1-2022. [employer] Employee Accommodations form filled out and to be placed on Dr. Gaye Issa desk for Kivun Hadash. [phone]